# Patient Record
Sex: FEMALE | Race: WHITE | Employment: OTHER | URBAN - METROPOLITAN AREA
[De-identification: names, ages, dates, MRNs, and addresses within clinical notes are randomized per-mention and may not be internally consistent; named-entity substitution may affect disease eponyms.]

---

## 2017-01-03 DIAGNOSIS — I10 ESSENTIAL HYPERTENSION, BENIGN: Chronic | ICD-10-CM

## 2017-01-03 DIAGNOSIS — E11.9 TYPE 2 DIABETES MELLITUS WITHOUT COMPLICATION, WITHOUT LONG-TERM CURRENT USE OF INSULIN (HCC): ICD-10-CM

## 2017-01-03 RX ORDER — LOSARTAN POTASSIUM 100 MG/1
100 TABLET ORAL DAILY
Qty: 30 TAB | Refills: 3 | Status: SHIPPED | OUTPATIENT
Start: 2017-01-03 | End: 2017-01-04 | Stop reason: SDUPTHER

## 2017-01-03 RX ORDER — AMLODIPINE BESYLATE 5 MG/1
5 TABLET ORAL 2 TIMES DAILY
Qty: 60 TAB | Refills: 3 | Status: SHIPPED | OUTPATIENT
Start: 2017-01-03 | End: 2017-01-04 | Stop reason: SDUPTHER

## 2017-01-03 RX ORDER — METFORMIN HYDROCHLORIDE 500 MG/1
TABLET, EXTENDED RELEASE ORAL
Qty: 90 TAB | Refills: 3 | Status: SHIPPED | OUTPATIENT
Start: 2017-01-03 | End: 2017-01-04 | Stop reason: SDUPTHER

## 2017-01-04 DIAGNOSIS — I10 ESSENTIAL HYPERTENSION, BENIGN: Chronic | ICD-10-CM

## 2017-01-04 DIAGNOSIS — E11.9 TYPE 2 DIABETES MELLITUS WITHOUT COMPLICATION, WITHOUT LONG-TERM CURRENT USE OF INSULIN (HCC): ICD-10-CM

## 2017-01-04 RX ORDER — LOSARTAN POTASSIUM 100 MG/1
100 TABLET ORAL DAILY
Qty: 90 TAB | Refills: 1 | Status: SHIPPED | OUTPATIENT
Start: 2017-01-04 | End: 2017-06-27 | Stop reason: SDUPTHER

## 2017-01-04 RX ORDER — AMLODIPINE BESYLATE 5 MG/1
5 TABLET ORAL 2 TIMES DAILY
Qty: 180 TAB | Refills: 1 | Status: SHIPPED | OUTPATIENT
Start: 2017-01-04 | End: 2017-03-28 | Stop reason: SDUPTHER

## 2017-01-04 RX ORDER — METFORMIN HYDROCHLORIDE 500 MG/1
TABLET, EXTENDED RELEASE ORAL
Qty: 270 TAB | Refills: 1 | Status: SHIPPED | OUTPATIENT
Start: 2017-01-04 | End: 2017-11-17 | Stop reason: SDUPTHER

## 2017-01-05 ENCOUNTER — TELEPHONE (OUTPATIENT)
Dept: INTERNAL MEDICINE CLINIC | Age: 62
End: 2017-01-05

## 2017-01-05 DIAGNOSIS — E11.9 TYPE 2 DIABETES MELLITUS WITHOUT COMPLICATION, WITHOUT LONG-TERM CURRENT USE OF INSULIN (HCC): ICD-10-CM

## 2017-01-14 RX ORDER — ANASTROZOLE 1 MG/1
TABLET ORAL
Qty: 90 TAB | Refills: 3 | Status: SHIPPED | OUTPATIENT
Start: 2017-01-14 | End: 2017-11-20

## 2017-01-16 DIAGNOSIS — K21.9 GASTROESOPHAGEAL REFLUX DISEASE WITHOUT ESOPHAGITIS: ICD-10-CM

## 2017-01-16 RX ORDER — OMEPRAZOLE 20 MG/1
20 CAPSULE, DELAYED RELEASE ORAL DAILY
Qty: 90 CAP | Refills: 1 | Status: SHIPPED | OUTPATIENT
Start: 2017-01-16 | End: 2018-02-22

## 2017-01-19 DIAGNOSIS — E78.9 LIPID DISORDER: ICD-10-CM

## 2017-01-19 RX ORDER — SIMVASTATIN 5 MG/1
5 TABLET, FILM COATED ORAL
Qty: 90 TAB | Refills: 1 | Status: SHIPPED | OUTPATIENT
Start: 2017-01-19 | End: 2017-07-22 | Stop reason: SDUPTHER

## 2017-02-06 DIAGNOSIS — E03.9 ACQUIRED HYPOTHYROIDISM: ICD-10-CM

## 2017-02-06 RX ORDER — LEVOTHYROXINE SODIUM 137 UG/1
137 TABLET ORAL
Qty: 90 TAB | Refills: 2 | Status: SHIPPED | OUTPATIENT
Start: 2017-02-06 | End: 2017-10-26 | Stop reason: SDUPTHER

## 2017-03-07 ENCOUNTER — TELEPHONE (OUTPATIENT)
Dept: INTERNAL MEDICINE CLINIC | Age: 62
End: 2017-03-07

## 2017-03-07 NOTE — TELEPHONE ENCOUNTER
Wants to be referred to aging assessment center [facility] for [address], in [place], [place]    She wants Dr Fatmata Manjarrez to give a referral to let her go. Not an insurance referral so she stated. 4     Please call pt back [phone], she said she would like it to be faxed to [address] [place], [place] [address] [address]     She says she wants it for her  as well.

## 2017-03-07 NOTE — TELEPHONE ENCOUNTER
Patient would like a doctor referral to be assessed at the .  She states that she and her  are trying to get an evaluation. She would like a call back at [phone] or if you can mail the referral for the patient and her  to their home address.

## 2017-03-07 NOTE — TELEPHONE ENCOUNTER
----- Message from Loren Valenzuela sent at 3/7/2017  1:14 PM EST -----  Regarding: Dr Katy Loya needs a referred to [facility] for healthy aging [phone], please fax to [phone], need as much information as possible, if you have any question please call pt at [phone].

## 2017-03-09 ENCOUNTER — TELEPHONE (OUTPATIENT)
Dept: INTERNAL MEDICINE CLINIC | Age: 62
End: 2017-03-09

## 2017-03-14 ENCOUNTER — TELEPHONE (OUTPATIENT)
Dept: INTERNAL MEDICINE CLINIC | Age: 62
End: 2017-03-14

## 2017-03-22 ENCOUNTER — TELEPHONE (OUTPATIENT)
Dept: INTERNAL MEDICINE CLINIC | Age: 62
End: 2017-03-22

## 2017-03-22 RX ORDER — QUETIAPINE FUMARATE 300 MG/1
300 TABLET, FILM COATED ORAL 2 TIMES DAILY
Qty: 60 TAB | Refills: 0
Start: 2017-03-22 | End: 2019-10-22 | Stop reason: ALTCHOICE

## 2017-03-22 NOTE — TELEPHONE ENCOUNTER
----- Message from [facility] Sergio Sykes sent at 3/21/2017 10:46 AM EDT -----  Regarding: RE: Referral Request  Contact: [phone]  I no longer take the [facility] for back pain. My Seroquel has been raised to 300mg tab, 1 tab 2x a day.  ----- Message -----  From: Lorna Renteria  Sent: 3/10/2017  1:24 PM EST  To: Rupal Waldrop  Subject: RE: Referral Request    Ms Vamshi Workman will need to come in and fill out a medical release form for the both of you, also will nee fax and phone number as well as address of the center you need records to go to, as well as what records are needed. I would be happy to help you get them filled out when you come in. Thank you! Ch[place]/ Med Records Dept    ----- Message -----     From: Rupal Waldrop     Sent: 3/9/2017  4:26 PM EST       To: Arti Pedersen MD  Subject: Referral Request    Bryanna Khan and I have decided to go to the [facility] in [place] to give us a comprehensive assessment of our medical, psychological, financial assessment and more. Basically they want my chart faxed to them at [phone]. I definitely need some caregiving advice and Bryanna Khan will see a doctor that specializes in movement disorders. They also have a website. Could you share this with Dr. Jayson Montemayor as Bryanna Khan will need the same kind of referral sent. We are trying to get the process going and your referrals start the process. Thanks. Ariel Estes [facility]

## 2017-03-28 ENCOUNTER — TELEPHONE (OUTPATIENT)
Dept: INTERNAL MEDICINE CLINIC | Age: 62
End: 2017-03-28

## 2017-03-28 DIAGNOSIS — I10 ESSENTIAL HYPERTENSION, BENIGN: Chronic | ICD-10-CM

## 2017-03-28 RX ORDER — AMLODIPINE BESYLATE 10 MG/1
10 TABLET ORAL DAILY
Qty: 90 TAB | Refills: 0 | Status: SHIPPED | OUTPATIENT
Start: 2017-03-28 | End: 2017-07-22 | Stop reason: SDUPTHER

## 2017-03-28 RX ORDER — AMLODIPINE BESYLATE 5 MG/1
5 TABLET ORAL 2 TIMES DAILY
Qty: 180 TAB | Refills: 0 | Status: SHIPPED | OUTPATIENT
Start: 2017-03-28 | End: 2017-03-28 | Stop reason: SDUPTHER

## 2017-03-28 NOTE — TELEPHONE ENCOUNTER
[facility] states insurance will not cover Amlodipine 5mg twice daily since the 10mg is available. Wants to know if Dr. Dayton Mortimer wants to change it to that.  Please call [phone]

## 2017-04-02 RX ORDER — PANTOPRAZOLE SODIUM 40 MG/1
40 TABLET, DELAYED RELEASE ORAL DAILY
Qty: 30 TAB | Refills: 5 | Status: SHIPPED | OUTPATIENT
Start: 2017-04-02 | End: 2017-04-20

## 2017-04-20 ENCOUNTER — OFFICE VISIT (OUTPATIENT)
Dept: INTERNAL MEDICINE CLINIC | Age: 62
End: 2017-04-20

## 2017-04-20 VITALS
OXYGEN SATURATION: 97 % | HEART RATE: 94 BPM | WEIGHT: 177.8 LBS | HEIGHT: 64 IN | SYSTOLIC BLOOD PRESSURE: 142 MMHG | TEMPERATURE: 97.6 F | RESPIRATION RATE: 16 BRPM | BODY MASS INDEX: 30.35 KG/M2 | DIASTOLIC BLOOD PRESSURE: 81 MMHG

## 2017-04-20 DIAGNOSIS — I10 ESSENTIAL HYPERTENSION, BENIGN: Chronic | ICD-10-CM

## 2017-04-20 DIAGNOSIS — K21.9 GASTROESOPHAGEAL REFLUX DISEASE WITHOUT ESOPHAGITIS: ICD-10-CM

## 2017-04-20 DIAGNOSIS — E11.9 TYPE 2 DIABETES MELLITUS WITHOUT COMPLICATION, WITHOUT LONG-TERM CURRENT USE OF INSULIN (HCC): Primary | ICD-10-CM

## 2017-04-20 DIAGNOSIS — G25.81 RESTLESS LEG SYNDROME: ICD-10-CM

## 2017-04-20 RX ORDER — ROPINIROLE 4 MG/1
4 TABLET, FILM COATED ORAL
Qty: 30 TAB | Refills: 5 | Status: SHIPPED | OUTPATIENT
Start: 2017-04-20 | End: 2017-07-11 | Stop reason: SDUPTHER

## 2017-04-20 NOTE — PROGRESS NOTES
HISTORY OF PRESENT ILLNESS  Pattie Norton is a 57 y.o. female. HPI    Diabetic Review of Systems - medication compliance: compliant all of the time, diabetic diet compliance: compliant most of the time, home glucose monitoring: is not performed because she does not have a glucometer, further diabetic ROS: no polyuria or polydipsia, no chest pain, dyspnea or TIA's, no numbness, tingling or pain in extremities. Other symptoms and concerns: She does not have eye insurance and will have eye exam performed when she can afford it. Pt does not eat a lot of carbohydrates and states she eats protein and vegetables for dinner. Pt is trying to exercise. She complains of heartburn that has been present since her insurance declined Prilosec. Reports her insurance tells her to take OTC medication instead of prescription. Pt stopped taking OTC medication two days ago and took [employer] which helped and states metallic taste has resolved. Hypertension ROS: taking medications as instructed, no medication side effects noted, no TIA's, no chest pain on exertion, no dyspnea on exertion, no swelling of ankles. New concerns: Stable- bp was 142/81 in the office today. Reports as soon as she starts to relax at night she has restless legs. She has taken her husbands Requip 4 mg tablet with relief. She states she is stressed in regards to her family and finances. Pt states her husbands health has been good and her children are doing well. Her father had to be put in a memory care unit and she is dealing with both of her parents health issues. Pt does not work. Review of Systems   All other systems reviewed and are negative. Physical Exam   Constitutional: She is oriented to person, place, and time. She appears well-developed and well-nourished. HENT:   Head: Normocephalic and atraumatic.    Right Ear: External ear normal.   Left Ear: External ear normal.   Nose: Nose normal.   Mouth/Throat: Oropharynx is clear and moist.   Eyes: Conjunctivae and EOM are normal.   Neck: Normal range of motion. Neck supple. Carotid bruit is not present. No thyroid mass and no thyromegaly present. Cardiovascular: Normal rate, regular rhythm, S1 normal, S2 normal and intact distal pulses. Murmur (has had this before) heard. Pulmonary/Chest: Effort normal and breath sounds normal.   Abdominal: Soft. Normal appearance and bowel sounds are normal. There is no hepatosplenomegaly. There is no tenderness. Musculoskeletal: Normal range of motion. Neurological: She is alert and oriented to person, place, and time. She has normal strength. No cranial nerve deficit or sensory deficit. Coordination normal.   Skin: Skin is warm, dry and intact. No abrasion and no rash noted. Psychiatric: She has a normal mood and affect. Her behavior is normal. Judgment and thought content normal.   Nursing note and vitals reviewed. ASSESSMENT and PLAN  Nereida Segura was seen today for heartburn and leg pain. Diagnoses and all orders for this visit:    Type 2 diabetes mellitus without complication, without long-term current use of insulin (HCC)  Stable- Continue current medications. If her sugars come back high I suggested she talk to Wing Dimas although already spoke with diabetic specialist and her diet is good. -     HEMOGLOBIN A1C WITH EAG  -     MICROALBUMIN, UR, RAND W/ MICROALBUMIN/CREA RATIO    Gastroesophageal reflux disease without esophagitis  Pt started taking Pepto Bismol two days ago which helped her heartburn and also helped metallic taste. I warned pt that Darlene McKenzie can make her stool darker and is not safe to take long term although she can take this prn. She should call her insurance company and ask how many OTC medications she needs to fail before she can take prescription medication.     Essential hypertension, benign  BP is at goal. I do not recommend any change in medications.  -     METABOLIC PANEL, COMPREHENSIVE    Restless leg syndrome  Pt to begin taking Requip 4 mg as directed. -     CBC W/O DIFF    Other orders  -     rOPINIRole (REQUIP) 4 mg tab TAB; Take 1 Tab by mouth nightly. lab results and schedule of future lab studies reviewed with patient  reviewed diet, exercise and weight control  reviewed medications and side effects in detail     Written by Darin Branham, as dictated by Linsey Tse MD.     Current diagnosis and concerns discussed with pt at length. Understands risks and benefits or current treatment plan and medications and accepts the treatment and medication with any possible risks. Pt asks appropriate questions which were answered. Pt instructed to call with any concerns or problems.

## 2017-04-21 LAB
ALBUMIN SERPL-MCNC: 4.6 G/DL (ref 3.6–4.8)
ALBUMIN/CREAT UR: 78.9 MG/G CREAT (ref 0–30)
ALBUMIN/GLOB SERPL: 1.6 {RATIO} (ref 1.2–2.2)
ALP SERPL-CCNC: 127 IU/L (ref 39–117)
ALT SERPL-CCNC: 48 IU/L (ref 0–32)
AST SERPL-CCNC: 37 IU/L (ref 0–40)
BILIRUB SERPL-MCNC: 0.2 MG/DL (ref 0–1.2)
BUN SERPL-MCNC: 12 MG/DL (ref 8–27)
BUN/CREAT SERPL: 20 (ref 12–28)
CALCIUM SERPL-MCNC: 9.5 MG/DL (ref 8.7–10.3)
CHLORIDE SERPL-SCNC: 97 MMOL/L (ref 96–106)
CO2 SERPL-SCNC: 22 MMOL/L (ref 18–29)
CREAT SERPL-MCNC: 0.6 MG/DL (ref 0.57–1)
CREAT UR-MCNC: 19.9 MG/DL
ERYTHROCYTE [DISTWIDTH] IN BLOOD BY AUTOMATED COUNT: 15.4 % (ref 12.3–15.4)
EST. AVERAGE GLUCOSE BLD GHB EST-MCNC: 226 MG/DL
GLOBULIN SER CALC-MCNC: 2.9 G/DL (ref 1.5–4.5)
GLUCOSE SERPL-MCNC: 262 MG/DL (ref 65–99)
HBA1C MFR BLD: 9.5 % (ref 4.8–5.6)
HCT VFR BLD AUTO: 41.4 % (ref 34–46.6)
HGB BLD-MCNC: 13.9 G/DL (ref 11.1–15.9)
MCH RBC QN AUTO: 28 PG (ref 26.6–33)
MCHC RBC AUTO-ENTMCNC: 33.6 G/DL (ref 31.5–35.7)
MCV RBC AUTO: 83 FL (ref 79–97)
MICROALBUMIN UR-MCNC: 15.7 UG/ML
PLATELET # BLD AUTO: 305 X10E3/UL (ref 150–379)
POTASSIUM SERPL-SCNC: 4.5 MMOL/L (ref 3.5–5.2)
PROT SERPL-MCNC: 7.5 G/DL (ref 6–8.5)
RBC # BLD AUTO: 4.97 X10E6/UL (ref 3.77–5.28)
SODIUM SERPL-SCNC: 139 MMOL/L (ref 134–144)
WBC # BLD AUTO: 5.6 X10E3/UL (ref 3.4–10.8)

## 2017-04-23 NOTE — PROGRESS NOTES
I guess a lot of my poorly controlled diabetics came in this past week! Can you meet with her?  See my chart message to her

## 2017-04-25 DIAGNOSIS — E11.9 TYPE 2 DIABETES MELLITUS WITHOUT COMPLICATION, WITHOUT LONG-TERM CURRENT USE OF INSULIN (HCC): ICD-10-CM

## 2017-05-02 ENCOUNTER — OFFICE VISIT (OUTPATIENT)
Dept: INTERNAL MEDICINE CLINIC | Age: 62
End: 2017-05-02

## 2017-05-02 DIAGNOSIS — E11.9 TYPE 2 DIABETES MELLITUS WITHOUT COMPLICATION, WITHOUT LONG-TERM CURRENT USE OF INSULIN (HCC): Primary | ICD-10-CM

## 2017-05-02 RX ORDER — LANCETS
EACH MISCELLANEOUS
Qty: 100 EACH | Refills: 3 | Status: SHIPPED | OUTPATIENT
Start: 2017-05-02 | End: 2019-11-26 | Stop reason: SDUPTHER

## 2017-05-02 RX ORDER — INSULIN PUMP SYRINGE, 3 ML
EACH MISCELLANEOUS
Qty: 1 KIT | Refills: 0 | Status: SHIPPED | OUTPATIENT
Start: 2017-05-02 | End: 2019-10-22 | Stop reason: ALTCHOICE

## 2017-05-02 NOTE — PATIENT INSTRUCTIONS
1)  meter, test strips, and lancets at [facility]    2)  Come in for appt on Thursday to learn how to use your meter    3) Start checking sugars every morning fasting    4) Continue Metformin ER 500mg 2 tablets in the morning and 1 tablet at night and Jardiance 25mg daily

## 2017-05-02 NOTE — Clinical Note
I sent in rx for meter and strips- she's going to come back in Thursday so i can show her how to use it. Then i'll see her in a month with her blood sugar log. Thanks!

## 2017-05-02 NOTE — MR AVS SNAPSHOT
Visit Information Date & Time Provider Department Dept. Phone Encounter #  
 5/2/2017 10:00 AM [facility] [address] [id number] Upcoming Health Maintenance Date Due  
 EYE EXAM RETINAL OR DILATED Q1 2/25/2016 INFLUENZA AGE 9 TO ADULT 8/1/2017 Pneumococcal 19-64 Highest Risk (2 of 3 - PCV13) 10/17/2017 LIPID PANEL Q1 10/17/2017 HEMOGLOBIN A1C Q6M 10/20/2017 FOOT EXAM Q1 4/20/2018 MICROALBUMIN Q1 4/20/2018 BREAST CANCER SCRN MAMMOGRAM 8/8/2018 PAP AKA CERVICAL CYTOLOGY 7/28/2021 COLONOSCOPY 8/20/2024 DTaP/Tdap/Td series (2 - Td) 2/25/2025 Allergies as of 5/2/2017  Review Complete On: 5/2/2017 By: Marshall Sherman No Known Allergies Current Immunizations  Never Reviewed Name Date Influenza Vaccine 10/10/2016, 10/1/2014 Tdap 2/25/2015 Not reviewed this visit Vitals OB Status Smoking Status Postmenopausal Never Smoker Preferred Pharmacy Pharmacy Name Phone [id number] [place], [address] AT [facility] [phone] Your Updated Medication List  
  
   
This list is accurate as of: 5/2/17 10:33 AM.  Always use your most recent med list. amLODIPine 10 mg tablet Commonly known as:  Veronica Punter Take 1 Tab by mouth daily for 90 days. anastrozole 1 mg tablet Commonly known as:  ARIMIDEX TAKE 1 TABLET BY MOUTH EVERY DAY  
  
 aspirin delayed-release 81 mg tablet Take 81 mg by mouth daily. citalopram 40 mg tablet Commonly known as:  CELEXA  
TAKE 1 TABLET BY MOUTH EVERY DAY  
  
 clonazePAM 1 mg tablet Commonly known as:   Sours Take 1 Tab by mouth three (3) times daily. Max Daily Amount: 3 mg. DAILY MULTI-VITAMIN PO Take 1 Tab by mouth daily. empagliflozin 25 mg tablet Commonly known as:  Nikki Riff Take 1 Tab by mouth daily. gabapentin 100 mg capsule Commonly known as:  NEURONTIN Take 100 mg by mouth three (3) times daily. Taking 1 in the morning and 3 at 6pm  
  
 levothyroxine 137 mcg tablet Commonly known as:  SYNTHROID Take 137 mcg by mouth Daily (before breakfast). losartan 100 mg tablet Commonly known as:  COZAAR Take 1 Tab by mouth daily. metFORMIN  mg tablet Commonly known as:  GLUCOPHAGE XR Two in morning and one at night  
  
 methylphenidate 20 mg tablet Commonly known as:  RITALIN Take 2 Tabs by mouth two (2) times a day. omeprazole 20 mg capsule Commonly known as:  PRILOSEC Take 1 Cap by mouth daily for 90 days. QUEtiapine 300 mg tablet Commonly known as:  SEROquel Take 1 Tab by mouth two (2) times a day. rOPINIRole 4 mg Tab TAB Commonly known as:  [place] Take 1 Tab by mouth nightly. simvastatin 5 mg tablet Commonly known as:  ZOCOR Take 1 Tab by mouth nightly for 30 days. traZODone 100 mg tablet Commonly known as:  DESYREL  
TAKE 3 TABLETS BY MOUTH AT BEDTIME Patient Instructions 1)  meter, test strips, and lancets at [facility] 
 
2)  Come in for appt on Thursday to learn how to use your meter 3) Start checking sugars every morning fasting 4) Continue Metformin ER 500mg 2 tablets in the morning and 1 tablet at night and Jardiance 25mg daily Introducing hospitals & HEALTH SERVICES! Dear Noni Olguin: Thank you for requesting a [employer] account. Our records indicate that you already have an active [employer] account. You can access your account anytime at [email or web address] Did you know that you can access your hospital and ER discharge instructions at any time in [employer]? You can also review all of your test results from your hospital stay or ER visit. Additional Information If you have questions, please visit the Frequently Asked Questions section of the [employer] website at [email or web address] Remember, [facility] is NOT to be used for urgent needs. For medical emergencies, dial 911. Now available from your iPhone and Android! Please provide this summary of care documentation to your next provider. Your primary care clinician is listed as Tahir Crow. If you have any questions after today's visit, please call [phone].

## 2017-05-02 NOTE — PROGRESS NOTES
CC:  Diabetes management/education    S/O: Rosina Hills is a 57 y.o. female referred by Dr. Champ Alonzo MD for diabetes management. HPI: Patient has had diabetes for several years. Started out on just Metformin and has since added Jardiance. Patient reports that she only recently realized she was taking Metformin incorrectly (2 tabs/day vs 3 tabs/day). When she saw Dr. Brain Lima she realized this and has now been doing 3 tabs/day and has increased the jardiance to 25mg/day. She is concerned that her a1c is up and thinks it is direction related to her diet and the mix up with medications. She also complains of a metallic taste in her mouth that is interfering with her eating food and what she can eat. Describes it as a burning feeling with a metallic taste. Food does not make it worse or better, doesn't seem to be directly related. Is scheduled to see a GI doc on May 19. Current Diabetes Regimen:  -Metformin ER 500mg 2 tabs in the morning and 1 tab in the evening  -Jardiance 25mg daily    ROS:   Patient denies hypoglycemic and hyperglycemic signs/symptoms, chest pain, shortness of breath, neuropathy, vision changes, and any foot changes.     Self-Monitoring Blood Glucose: has never checked blood sugars      Diet was reviewed extensively and the following were noted:  -11am first meal- grilled cheese sandwich, peanut butter sandwich, tuna salad, leftovers  -doesn't do hamburgers  -last night: chicken stirred in the pan with rice a haley  -dinner: tries to incorporate vegetables (greens, green beans, brussel sprouts, broccoli, cauliflower), milk (1% - about 1 cup), always a meat (chicken, fish, hamburger patties, pork chops), typically limits the carbs at dinner  -snacks: cheese, sugar free puddings, fresh fruit, popcorn  -eating sugar free ice cream at night to help with the burning  -drink: iced tea (unsweetened with splenda), water, cherry coke zero, lemonade (sugar free)    Exercise:  - has parkinsons and he rides the recumbent bike and she does house work for exercise  -will walk around the pond - 30 minutes - maybe 3-4 times a week      Past Medical History:   Diagnosis Date    ADD (attention deficit disorder) 8/17/2009    Breast cancer ([address]     Depressive disorder, not elsewhere classified 8/17/2009    Essential hypertension, benign 8/17/2009    Hypertension     Pap smear for cervical cancer screening 07/28/2016    negative, HPV negative    Psychiatric disorder 2007    panic attacks/PTSD    PTSD (post-traumatic stress disorder)     Thyroid disease     Unspecified hypothyroidism 8/17/2009     Past Surgical History:   Procedure Laterality Date    BREAST SURGERY PROCEDURE UNLISTED  January 2012    left breast biopsy    HX GYN      c section    HX HEENT  1992    partial thyroidectomy    HX TONSILLECTOMY      HX UROLOGICAL  1992    Bladder suspension     Family History   Problem Relation Age of Onset    Diabetes Mother     Cancer Mother      breast    Stroke Mother     Hypertension Father     Elevated Lipids Father     Migraines Father     Dementia Father      Social History     Social History    Marital status:      Spouse name: N/A    Number of children: N/A    Years of education: N/A     Social History Main Topics    Smoking status: Never Smoker    Smokeless tobacco: Never Used    Alcohol use No    Drug use: No    Sexual activity: No     Other Topics Concern    Not on file     Social History Narrative     No Known Allergies  Current Outpatient Prescriptions   Medication Sig    glucose blood VI test strips (ASCENSIA AUTODISC VI, ONE TOUCH ULTRA TEST VI) strip Test sugars once daily as directed    Blood-Glucose Meter monitoring kit Use meter to test sugars once daily    Lancets misc Use to test blood sugars once daily    empagliflozin (JARDIANCE) 25 mg tablet Take 1 Tab by mouth daily.  rOPINIRole (REQUIP) 4 mg tab TAB Take 1 Tab by mouth nightly.     amLODIPine (NORVASC) 10 mg tablet Take 1 Tab by mouth daily for 90 days.  QUEtiapine (SEROQUEL) 300 mg tablet Take 1 Tab by mouth two (2) times a day.  levothyroxine (SYNTHROID) 137 mcg tablet Take 137 mcg by mouth Daily (before breakfast).  simvastatin (ZOCOR) 5 mg tablet Take 1 Tab by mouth nightly for 30 days.  anastrozole (ARIMIDEX) 1 mg tablet TAKE 1 TABLET BY MOUTH EVERY DAY    losartan (COZAAR) 100 mg tablet Take 1 Tab by mouth daily.  metFORMIN ER (GLUCOPHAGE XR) 500 mg tablet Two in morning and one at night    gabapentin (NEURONTIN) 100 mg capsule Take 100 mg by mouth three (3) times daily. Taking 1 in the morning and 3 at 6pm    aspirin delayed-release 81 mg tablet Take 81 mg by mouth daily.  citalopram (CELEXA) 40 mg tablet TAKE 1 TABLET BY MOUTH EVERY DAY    traZODone (DESYREL) 100 mg tablet TAKE 3 TABLETS BY MOUTH AT BEDTIME    clonazePAM (KLONOPIN) 1 mg tablet Take 1 Tab by mouth three (3) times daily. Max Daily Amount: 3 mg.  methylphenidate (RITALIN) 20 mg tablet Take 2 Tabs by mouth two (2) times a day.  DAILY MULTI-VITAMIN PO Take 1 Tab by mouth daily.  omeprazole (PRILOSEC) 20 mg capsule Take 1 Cap by mouth daily for 90 days. No current facility-administered medications for this visit. There were no vitals taken for this visit. Data reviewed:  Lab Results   Component Value Date/Time    Hemoglobin A1c 9.5 04/20/2017 11:02 AM     Lab Results   Component Value Date/Time    Cholesterol, total 187 10/17/2016 09:53 AM    HDL Cholesterol 49 10/17/2016 09:53 AM    LDL, calculated 87 10/17/2016 09:53 AM    VLDL, calculated 51 10/17/2016 09:53 AM    Triglyceride 253 10/17/2016 09:53 AM    CHOL/HDL Ratio 3.3 09/21/2010 10:40 AM     Lab Results   Component Value Date/Time    ALT (SGPT) 48 04/20/2017 11:02 AM    AST (SGOT) 37 04/20/2017 11:02 AM    Alk.  phosphatase 127 04/20/2017 11:02 AM    Bilirubin, total 0.2 04/20/2017 11:02 AM     Lab Results   Component Value Date/Time    Creatinine 0.60 2017 11:02 AM     Lab Results   Component Value Date/Time    Microalb/Creat ratio (ug/mg creat.) 78.9 2017 11:02 AM    Microalbumin, urine 15.7 2017 11:02 AM       Diabetes Health Maintenance:  Last eye exam: due now  Last foot exam: 17  Last influenza vaccine: 10/10/16  Last Pneumovax 23 vaccine: due now  Last Prevnar-13 vaccine: due at 64  Hepatitis B Series: unknown  ASA Therapy: ASA 81mg daily  ACE/ARB Therapy: Losartan 100mg daily  Statin Therapy: Simvastatin 5 mg daily    Assessment/Plan:     1. Diabetes:  A1c not at goal <7% and has been deteriorating over the past year. Patient has never checked blood sugars but is willing to start doing this.  -Sent in an rx for meter, strips and lancets. Pt to  today and come back on Thursday so that I can show her how to use this.  -will have her check sugars and record on a log and follow-up in 1 month to see what changes are needed.  -discussed diet / exercise with patient but will spend more time on this at future appointments      Patient verbalized understanding of the information presented and all of the patients questions were answered. AVS was handed to the patient and information reviewed. Patient advised to call me or Dr. Alyssa Ortiz MD with any additional questions or concerns. Follow-up: 2 days (meter education) 1 month (follow-up on blood sugars)    Notification of recommendations will be sent to Dr. Alyssa Ortiz MD for review.     Thank you for the consult,  Angelito Marroquin, PharmD, BCPS, CDE    Time spent with the patient: 60 min  Time spent documentin min

## 2017-05-04 ENCOUNTER — OFFICE VISIT (OUTPATIENT)
Dept: INTERNAL MEDICINE CLINIC | Age: 62
End: 2017-05-04

## 2017-05-04 DIAGNOSIS — E11.9 TYPE 2 DIABETES MELLITUS WITHOUT COMPLICATION, WITHOUT LONG-TERM CURRENT USE OF INSULIN (HCC): Primary | ICD-10-CM

## 2017-05-04 NOTE — Clinical Note
Taught her how to use her meter today, she'll be back in a month to review her blood sugars. I'll keep you posted.

## 2017-05-04 NOTE — PROGRESS NOTES
CC: patient is here for glucometer teaching    S/O: Gabbie Poole is a 56 yo F here to learn how to use her meter. She was able to pick it up from [facility] and the meter was ~10, the strips were free and the lancets were ~10. While teaching her how to use the meter, we discovered that [facility] had given her the wrong lancets. They dispensed one touch ultrasoft lancets and she needed one touch delica lancets. Advised her to take this back and exchange. The meter came with 10 sample lancets so I was still able to show her how to use her device. Pt states she has been working on cutting back carbs and really watching what she has been eating. A/P:   1) DM2: a1c not at goal of <7% however patient is motivated to see how her sugars are running and make changes. She was able to test her own blood sugar today to show proficiency with her meter. I gave her a blood glucose log and asked her to test fasting at least a few times a week. She will continue both her Metformin and Jardiance as prescribed and she will come back to see me in 1 month to see how her sugars are running. Patient verbalized understanding of information presented. Answered all of the patient's questions. AVS handed and reviewed with patient.     Eileen Herrera, PharmD, BCPS, CDE

## 2017-05-16 ENCOUNTER — TELEPHONE (OUTPATIENT)
Dept: INTERNAL MEDICINE CLINIC | Age: 62
End: 2017-05-16

## 2017-05-16 NOTE — TELEPHONE ENCOUNTER
Pt purchased a groupon for a massage, however, the place needs a medical release in order for pt to be able to get a massage. Pt would like a medical release as she can't get her money back. Call pt when letter is ready.  [phone]

## 2017-05-16 NOTE — LETTER
5/17/2017 1:08 PM 
 
 
 
 
 
 
RE: Ms. Linda Huang [address] To Whom It May Concern: 
 
Mrs. Sykes may receive massages. She does not have a medical condition that would be affected by this service. Thank you for your concern of my patient. Sincerely, Camryn HAZEL

## 2017-06-07 ENCOUNTER — OFFICE VISIT (OUTPATIENT)
Dept: INTERNAL MEDICINE CLINIC | Age: 62
End: 2017-06-07

## 2017-06-07 VITALS
BODY MASS INDEX: 28.79 KG/M2 | HEART RATE: 102 BPM | WEIGHT: 168.6 LBS | DIASTOLIC BLOOD PRESSURE: 76 MMHG | SYSTOLIC BLOOD PRESSURE: 139 MMHG | TEMPERATURE: 97.8 F | RESPIRATION RATE: 14 BRPM | HEIGHT: 64 IN | OXYGEN SATURATION: 97 %

## 2017-06-07 DIAGNOSIS — R53.83 FATIGUE, UNSPECIFIED TYPE: ICD-10-CM

## 2017-06-07 DIAGNOSIS — K21.9 GASTROESOPHAGEAL REFLUX DISEASE WITHOUT ESOPHAGITIS: ICD-10-CM

## 2017-06-07 DIAGNOSIS — I10 ESSENTIAL HYPERTENSION, BENIGN: Chronic | ICD-10-CM

## 2017-06-07 DIAGNOSIS — E11.9 TYPE 2 DIABETES MELLITUS WITHOUT COMPLICATION, WITHOUT LONG-TERM CURRENT USE OF INSULIN (HCC): ICD-10-CM

## 2017-06-07 DIAGNOSIS — K14.6 BURNING MOUTH SYNDROME: Primary | ICD-10-CM

## 2017-06-07 RX ORDER — PANTOPRAZOLE SODIUM 40 MG/1
TABLET, DELAYED RELEASE ORAL
Refills: 3 | COMMUNITY
Start: 2017-05-22 | End: 2018-08-07 | Stop reason: SDUPTHER

## 2017-06-07 NOTE — PROGRESS NOTES
HISTORY OF PRESENT ILLNESS  Frida Schaffer is a 57 y.o. female. HPI  Patient presents today for a burning sensation in her mouth. Patient states that on the left side of her mouth and tongue were burning last night when she attempted to eat yogurt. She states that this began about a week ago and has been persisting with speech and meals. Nothing new, no new meds, no new diets    She states that she has been extremely stressed out with dealing with her parents. Patient states that her esophagus is still constricted and has trouble with her diet. She states that she has had endoscopy on 5/19/2017. Patient is taking Protonix with relief of reflux. Hypertension ROS: taking medications as instructed, no medication side effects noted, no TIA's, no chest pain on exertion, no dyspnea on exertion, no swelling of ankles. New concerns:  Patient's BP in office today is 139/76. Diabetic Review of Systems - medication compliance: compliant most of the time, diabetic diet compliance: compliant most of the time, home glucose monitoring: is performed sporadically. Other symptoms and concerns: She is checking her sugars every other day with readings around 130.    hypothyroidism. Lab Results   Component Value Date/Time    TSH 0.762 07/12/2016 11:39 AM     Thyroid ROS: denies fatigue, weight changes, heat/cold intolerance, bowel/skin changes or CVS symptoms. Review of Systems   All other systems reviewed and are negative. Physical Exam   Constitutional: She is oriented to person, place, and time. She appears well-developed and well-nourished. HENT:   Head: Normocephalic and atraumatic. Right Ear: External ear normal.   Left Ear: External ear normal.   Nose: Nose normal.   Mouth/Throat: Oropharynx is clear and moist.   Eyes: Conjunctivae and EOM are normal.   Neck: Normal range of motion. Neck supple. Carotid bruit is not present. No thyroid mass and no thyromegaly present.    Cardiovascular: Normal rate, regular rhythm, S1 normal, S2 normal, normal heart sounds and intact distal pulses. Pulmonary/Chest: Effort normal and breath sounds normal.   Abdominal: Soft. Normal appearance and bowel sounds are normal. There is no hepatosplenomegaly. There is no tenderness. Musculoskeletal: Normal range of motion. Neurological: She is alert and oriented to person, place, and time. She has normal strength. No cranial nerve deficit or sensory deficit. Coordination normal.   Skin: Skin is warm, dry and intact. No abrasion and no rash noted. Psychiatric: She has a normal mood and affect. Her behavior is normal. Judgment and thought content normal.   Nursing note and vitals reviewed. ASSESSMENT and PLAN  Brandee Valdes was seen today for other. Diagnoses and all orders for this visit:    Burning mouth syndrome  Prescribed oral solution.  -     CBC WITH AUTOMATED DIFF  -     VITAMIN B12 & FOLATE  -     magic mouthwash solution; Magic mouth wash   Maalox  Lidocaine 2% viscous   Diphenhydramine oral solution     Pharmacy to mix equal portions of ingredients to a total volume as indicated in the dispense amount. Type 2 diabetes mellitus without complication, without long-term current use of insulin ([facility])  Continue to check sugars. Readings stay around 130. Follow up for A1C testing. Essential hypertension, benign  BP is stable. Continue to take meds. Gastroesophageal reflux disease without esophagitis  Continue to take Protonix. Fatigue, unspecified type  Check labs today.  -     TSH 3RD GENERATION      lab results and schedule of future lab studies reviewed with patient  reviewed diet, exercise and weight control    Written by Sima Bender, as dictated by Rhea Feldman MD.     Current diagnosis and concerns discussed with pt at length. Understands risks and benefits or current treatment plan and medications and accepts the treatment and medication with any possible risks.  Pt asks appropriate questions which were answered. Pt instructed to call with any concerns or problems.

## 2017-06-08 LAB
BASOPHILS # BLD AUTO: 0.1 X10E3/UL (ref 0–0.2)
BASOPHILS NFR BLD AUTO: 1 %
EOSINOPHIL # BLD AUTO: 0.2 X10E3/UL (ref 0–0.4)
EOSINOPHIL NFR BLD AUTO: 2 %
ERYTHROCYTE [DISTWIDTH] IN BLOOD BY AUTOMATED COUNT: 14.7 % (ref 12.3–15.4)
FOLATE SERPL-MCNC: >20 NG/ML
HCT VFR BLD AUTO: 41.1 % (ref 34–46.6)
HGB BLD-MCNC: 14.1 G/DL (ref 11.1–15.9)
IMM GRANULOCYTES # BLD: 0 X10E3/UL (ref 0–0.1)
IMM GRANULOCYTES NFR BLD: 0 %
LYMPHOCYTES # BLD AUTO: 2.2 X10E3/UL (ref 0.7–3.1)
LYMPHOCYTES NFR BLD AUTO: 23 %
MCH RBC QN AUTO: 27.8 PG (ref 26.6–33)
MCHC RBC AUTO-ENTMCNC: 34.3 G/DL (ref 31.5–35.7)
MCV RBC AUTO: 81 FL (ref 79–97)
MONOCYTES # BLD AUTO: 0.6 X10E3/UL (ref 0.1–0.9)
MONOCYTES NFR BLD AUTO: 7 %
NEUTROPHILS # BLD AUTO: 6.4 X10E3/UL (ref 1.4–7)
NEUTROPHILS NFR BLD AUTO: 67 %
PLATELET # BLD AUTO: 337 X10E3/UL (ref 150–379)
RBC # BLD AUTO: 5.07 X10E6/UL (ref 3.77–5.28)
TSH SERPL DL<=0.005 MIU/L-ACNC: 0.4 UIU/ML (ref 0.45–4.5)
VIT B12 SERPL-MCNC: 1307 PG/ML (ref 211–946)
WBC # BLD AUTO: 9.5 X10E3/UL (ref 3.4–10.8)

## 2017-06-27 DIAGNOSIS — I10 ESSENTIAL HYPERTENSION, BENIGN: Chronic | ICD-10-CM

## 2017-06-27 RX ORDER — LOSARTAN POTASSIUM 100 MG/1
TABLET ORAL
Qty: 90 TAB | Refills: 1 | Status: SHIPPED | OUTPATIENT
Start: 2017-06-27 | End: 2017-12-20 | Stop reason: SDUPTHER

## 2017-07-08 DIAGNOSIS — E11.9 TYPE 2 DIABETES MELLITUS WITHOUT COMPLICATION, WITHOUT LONG-TERM CURRENT USE OF INSULIN (HCC): ICD-10-CM

## 2017-07-08 RX ORDER — EMPAGLIFLOZIN 10 MG/1
TABLET, FILM COATED ORAL
Qty: 90 TAB | Refills: 1 | Status: SHIPPED | OUTPATIENT
Start: 2017-07-08 | End: 2017-11-20

## 2017-07-11 RX ORDER — ROPINIROLE 4 MG/1
4 TABLET, FILM COATED ORAL
Qty: 90 TAB | Refills: 0 | Status: SHIPPED | OUTPATIENT
Start: 2017-07-11 | End: 2017-10-06 | Stop reason: SDUPTHER

## 2017-07-22 DIAGNOSIS — E78.9 LIPID DISORDER: ICD-10-CM

## 2017-07-22 DIAGNOSIS — I10 ESSENTIAL HYPERTENSION, BENIGN: Chronic | ICD-10-CM

## 2017-07-22 RX ORDER — SIMVASTATIN 5 MG/1
TABLET, FILM COATED ORAL
Qty: 90 TAB | Refills: 1 | Status: SHIPPED | OUTPATIENT
Start: 2017-07-22 | End: 2018-01-21 | Stop reason: SDUPTHER

## 2017-07-22 RX ORDER — AMLODIPINE BESYLATE 10 MG/1
TABLET ORAL
Qty: 90 TAB | Refills: 0 | Status: SHIPPED | OUTPATIENT
Start: 2017-07-22 | End: 2017-10-26 | Stop reason: SDUPTHER

## 2017-10-06 ENCOUNTER — TELEPHONE (OUTPATIENT)
Dept: INTERNAL MEDICINE CLINIC | Age: 62
End: 2017-10-06

## 2017-10-06 DIAGNOSIS — E11.9 TYPE 2 DIABETES MELLITUS WITHOUT COMPLICATION, WITHOUT LONG-TERM CURRENT USE OF INSULIN (HCC): ICD-10-CM

## 2017-10-06 RX ORDER — EMPAGLIFLOZIN 25 MG/1
TABLET, FILM COATED ORAL
Qty: 90 TAB | Refills: 1 | Status: SHIPPED | OUTPATIENT
Start: 2017-10-06 | End: 2018-05-02 | Stop reason: SDUPTHER

## 2017-10-06 RX ORDER — ROPINIROLE 4 MG/1
TABLET, FILM COATED ORAL
Qty: 90 TAB | Refills: 0 | Status: SHIPPED | OUTPATIENT
Start: 2017-10-06 | End: 2018-07-30 | Stop reason: SDUPTHER

## 2017-10-26 DIAGNOSIS — I10 ESSENTIAL HYPERTENSION, BENIGN: Chronic | ICD-10-CM

## 2017-10-26 DIAGNOSIS — E03.9 ACQUIRED HYPOTHYROIDISM: ICD-10-CM

## 2017-10-26 RX ORDER — LEVOTHYROXINE SODIUM 137 UG/1
TABLET ORAL
Qty: 90 TAB | Refills: 2 | Status: SHIPPED | OUTPATIENT
Start: 2017-10-26 | End: 2018-07-31 | Stop reason: SDUPTHER

## 2017-10-26 RX ORDER — AMLODIPINE BESYLATE 10 MG/1
TABLET ORAL
Qty: 90 TAB | Refills: 0 | Status: SHIPPED | OUTPATIENT
Start: 2017-10-26 | End: 2018-02-17 | Stop reason: SDUPTHER

## 2017-11-17 DIAGNOSIS — E11.9 TYPE 2 DIABETES MELLITUS WITHOUT COMPLICATION, WITHOUT LONG-TERM CURRENT USE OF INSULIN (HCC): ICD-10-CM

## 2017-11-17 RX ORDER — METFORMIN HYDROCHLORIDE 500 MG/1
TABLET, EXTENDED RELEASE ORAL
Qty: 270 TAB | Refills: 1 | Status: SHIPPED | OUTPATIENT
Start: 2017-11-17 | End: 2018-05-18 | Stop reason: SDUPTHER

## 2017-11-20 ENCOUNTER — OFFICE VISIT (OUTPATIENT)
Dept: INTERNAL MEDICINE CLINIC | Age: 62
End: 2017-11-20

## 2017-11-20 VITALS
DIASTOLIC BLOOD PRESSURE: 61 MMHG | TEMPERATURE: 98.2 F | SYSTOLIC BLOOD PRESSURE: 109 MMHG | WEIGHT: 162.4 LBS | HEART RATE: 93 BPM | BODY MASS INDEX: 27.72 KG/M2 | OXYGEN SATURATION: 98 % | HEIGHT: 64 IN | RESPIRATION RATE: 14 BRPM

## 2017-11-20 DIAGNOSIS — L72.3 SEBACEOUS CYST OF RIGHT AXILLA: ICD-10-CM

## 2017-11-20 DIAGNOSIS — F98.8 ATTENTION DEFICIT DISORDER, UNSPECIFIED HYPERACTIVITY PRESENCE: ICD-10-CM

## 2017-11-20 DIAGNOSIS — I10 ESSENTIAL HYPERTENSION, BENIGN: Chronic | ICD-10-CM

## 2017-11-20 DIAGNOSIS — F34.1 DYSTHYMIA: Chronic | ICD-10-CM

## 2017-11-20 DIAGNOSIS — E11.9 TYPE 2 DIABETES MELLITUS WITHOUT COMPLICATION, WITHOUT LONG-TERM CURRENT USE OF INSULIN (HCC): Primary | ICD-10-CM

## 2017-11-20 DIAGNOSIS — E03.4 HYPOTHYROIDISM DUE TO ACQUIRED ATROPHY OF THYROID: Chronic | ICD-10-CM

## 2017-11-20 DIAGNOSIS — E78.5 HYPERLIPIDEMIA LDL GOAL <100: ICD-10-CM

## 2017-11-20 RX ORDER — GABAPENTIN 300 MG/1
CAPSULE ORAL
Refills: 1 | COMMUNITY
Start: 2017-11-08 | End: 2020-06-24 | Stop reason: SDUPTHER

## 2017-11-20 NOTE — PROGRESS NOTES
HISTORY OF PRESENT ILLNESS  Joya Ayers is a 60 y.o. female. HPI   Patient continues Klonopin, Seroquel, and Celexa. She states Ritalin has not been helping. Patient will follow up with psychiatry for change in medications. Patient reports fluid filled cyst of right axillary. She states she feels it intermittently and it sometimes drains. Diabetic Review of Systems - medication compliance: compliant all of the time, diabetic diet compliance: compliant all of the time, home glucose monitoring: is performed sporadically, further diabetic ROS: no polyuria or polydipsia, no chest pain, dyspnea or TIA's, no numbness, tingling or pain in extremities. Other symptoms and concerns: She states she has been eating well but not checking sugars. hypothyroidism. Lab Results   Component Value Date/Time    TSH 0.397 06/07/2017 03:23 PM     Thyroid ROS: denies fatigue, weight changes, heat/cold intolerance, bowel/skin changes or CVS symptoms. Patient continues Synthroid. Hyperlipidemia:  Cardiovascular risk analysis - 60 y.o. female LDL goal is under 100. ROS: taking medications as instructed, no medication side effects noted, no TIA's, no chest pain on exertion, no dyspnea on exertion, no swelling of ankles. Tolerating meds, no myalgias or other side effects noted  New concerns: Patient continues Zocor. Patient has followed up for flu vaccine. She will follow up for eye exam, followed by Dr. Jose Schulte. Patient reports her father passed away in October. Review of Systems   All other systems reviewed and are negative. Physical Exam   Constitutional: She is oriented to person, place, and time. She appears well-developed and well-nourished. HENT:   Head: Normocephalic and atraumatic. Right Ear: External ear normal.   Left Ear: External ear normal.   Nose: Nose normal.   Mouth/Throat: Oropharynx is clear and moist.   Eyes: Conjunctivae and EOM are normal.   Neck: Normal range of motion. Neck supple. Carotid bruit is not present. No thyroid mass and no thyromegaly present. Cardiovascular: Normal rate, regular rhythm, S1 normal, S2 normal, normal heart sounds and intact distal pulses. Pulmonary/Chest: Effort normal and breath sounds normal.   Abdominal: Soft. Normal appearance and bowel sounds are normal. There is no hepatosplenomegaly. There is no tenderness. Musculoskeletal: Normal range of motion. Neurological: She is alert and oriented to person, place, and time. She has normal strength. No cranial nerve deficit or sensory deficit. Coordination normal.   Skin: Skin is warm, dry and intact. No abrasion and no rash noted. Psychiatric: She has a normal mood and affect. Her behavior is normal. Judgment and thought content normal.   Nursing note and vitals reviewed. ASSESSMENT and PLAN  Diagnoses and all orders for this visit:    1. Type 2 diabetes mellitus without complication, without long-term current use of insulin (HCC)  Sugars stable. Continue to follow diabetic diet and monitor sugars.   -     LIPID PANEL  -     HEMOGLOBIN A1C WITH EAG  -     MICROALBUMIN, UR, RAND    2. Essential hypertension, benign  BP is at goal. I do not recommend any change in medications.  -     METABOLIC PANEL, COMPREHENSIVE    3. Hypothyroidism due to acquired atrophy of thyroid  Stable. I do not recommend a change in medications.  -     TSH 3RD GENERATION  -     T4, FREE    4. Dysthymia  Mood well managed. Continue current medications. 5. Attention deficit disorder, unspecified hyperactivity presence  Ritalin has lost its potency. Patient will follow up with psychiatry for change in medications. 6. Hyperlipidemia LDL goal <100  Stable, patient tolerating meds, no myalgias. I do not recommend any change in medications.  -     LIPID PANEL    7. Sebaceous cyst of right axilla  Will continue to watch.      lab results and schedule of future lab studies reviewed with patient  reviewed diet, exercise and weight control    Written by Kina Soto, as dictated by Cherylene Stage, MD.     Current diagnosis and concerns discussed with pt at length. Understands risks and benefits or current treatment plan and medications and accepts the treatment and medication with any possible risks. Pt asks appropriate questions which were answered. Pt instructed to call with any concerns or problems. This note will not be viewable in [address]

## 2017-12-03 LAB
ALBUMIN SERPL-MCNC: 4.5 G/DL (ref 3.6–4.8)
ALBUMIN/GLOB SERPL: 1.7 {RATIO} (ref 1.2–2.2)
ALP SERPL-CCNC: 102 IU/L (ref 39–117)
ALT SERPL-CCNC: 32 IU/L (ref 0–32)
AST SERPL-CCNC: 26 IU/L (ref 0–40)
BILIRUB SERPL-MCNC: 0.4 MG/DL (ref 0–1.2)
BUN SERPL-MCNC: 12 MG/DL (ref 8–27)
BUN/CREAT SERPL: 17 (ref 12–28)
CALCIUM SERPL-MCNC: 9.4 MG/DL (ref 8.7–10.3)
CHLORIDE SERPL-SCNC: 101 MMOL/L (ref 96–106)
CHOLEST SERPL-MCNC: 172 MG/DL (ref 100–199)
CO2 SERPL-SCNC: 20 MMOL/L (ref 18–29)
CREAT SERPL-MCNC: 0.69 MG/DL (ref 0.57–1)
EST. AVERAGE GLUCOSE BLD GHB EST-MCNC: 189 MG/DL
GFR SERPLBLD CREATININE-BSD FMLA CKD-EPI: 107 ML/MIN/1.73
GFR SERPLBLD CREATININE-BSD FMLA CKD-EPI: 93 ML/MIN/1.73
GLOBULIN SER CALC-MCNC: 2.6 G/DL (ref 1.5–4.5)
GLUCOSE SERPL-MCNC: 145 MG/DL (ref 65–99)
HBA1C MFR BLD: 8.2 % (ref 4.8–5.6)
HDLC SERPL-MCNC: 51 MG/DL
INTERPRETATION, 910389: NORMAL
LDLC SERPL CALC-MCNC: 85 MG/DL (ref 0–99)
Lab: NORMAL
MICROALBUMIN UR-MCNC: 17.5 UG/ML
POTASSIUM SERPL-SCNC: 4.6 MMOL/L (ref 3.5–5.2)
PROT SERPL-MCNC: 7.1 G/DL (ref 6–8.5)
SODIUM SERPL-SCNC: 140 MMOL/L (ref 134–144)
T4 FREE SERPL-MCNC: 1.6 NG/DL (ref 0.82–1.77)
TRIGL SERPL-MCNC: 178 MG/DL (ref 0–149)
TSH SERPL DL<=0.005 MIU/L-ACNC: 1.11 UIU/ML (ref 0.45–4.5)
VLDLC SERPL CALC-MCNC: 36 MG/DL (ref 5–40)

## 2017-12-03 NOTE — PROGRESS NOTES
Can you make an appointment for her to see you? We have made a difference but want it better!  Going over meds would help as well

## 2017-12-20 DIAGNOSIS — I10 ESSENTIAL HYPERTENSION, BENIGN: Chronic | ICD-10-CM

## 2017-12-20 RX ORDER — LOSARTAN POTASSIUM 100 MG/1
TABLET ORAL
Qty: 90 TAB | Refills: 1 | Status: SHIPPED | OUTPATIENT
Start: 2017-12-20 | End: 2018-07-14 | Stop reason: SDUPTHER

## 2018-01-21 DIAGNOSIS — E78.9 LIPID DISORDER: ICD-10-CM

## 2018-01-21 RX ORDER — SIMVASTATIN 5 MG/1
TABLET, FILM COATED ORAL
Qty: 90 TAB | Refills: 1 | Status: SHIPPED | OUTPATIENT
Start: 2018-01-21 | End: 2018-07-20 | Stop reason: SDUPTHER

## 2018-01-26 ENCOUNTER — TELEPHONE (OUTPATIENT)
Dept: INTERNAL MEDICINE CLINIC | Age: 63
End: 2018-01-26

## 2018-02-17 DIAGNOSIS — I10 ESSENTIAL HYPERTENSION, BENIGN: Chronic | ICD-10-CM

## 2018-02-18 RX ORDER — AMLODIPINE BESYLATE 10 MG/1
TABLET ORAL
Qty: 90 TAB | Refills: 0 | Status: SHIPPED | OUTPATIENT
Start: 2018-02-18 | End: 2018-05-19 | Stop reason: SDUPTHER

## 2018-02-22 ENCOUNTER — OFFICE VISIT (OUTPATIENT)
Dept: INTERNAL MEDICINE CLINIC | Age: 63
End: 2018-02-22

## 2018-02-22 DIAGNOSIS — E11.9 TYPE 2 DIABETES MELLITUS WITHOUT COMPLICATION, WITHOUT LONG-TERM CURRENT USE OF INSULIN (HCC): Primary | ICD-10-CM

## 2018-02-22 RX ORDER — GLIPIZIDE 5 MG/1
5 TABLET, FILM COATED, EXTENDED RELEASE ORAL DAILY
Qty: 90 TAB | Refills: 1 | Status: SHIPPED | OUTPATIENT
Start: 2018-02-22 | End: 2018-07-31 | Stop reason: SDUPTHER

## 2018-02-22 NOTE — PROGRESS NOTES
CC:  Diabetes management/education    S/O: Santa Becerril is a 60 y.o. female referred by Dr. Gayle Méndez MD for diabetes management. HPI: Patient is here to follow up on blood sugars. Patient last seen by me 17 where she was taught how to use a blood sugar meter. Patients a1c has improved since this time from 9.5 to 8.2 but is still out of goal range. Patient has been taking her medications consistently for several months with no issues. Current Diabetes Regimen:  Januvia 25mg once daily  Metformin ER 500mg 2 tabs in the morning 1 tab in evening    ROS:   Patient denies hypoglycemic and hyperglycemic signs/symptoms, chest pain, shortness of breath, neuropathy, vision changes, and any foot changes. Self-Monitoring Blood Glucose:  Not checking    Diet:  Tries to eat less carbs. Dad  a few months ago and feels like things have been a little crazy    Exercise:  Stays busy doing various volunteer activities. 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 Wednesday is crisis day when  bring in people. Data reviewed:  Lab Results   Component Value Date/Time    Hemoglobin A1c 8.2 (H) 2017 09:39 AM         Diabetes Health Maintenance:  Last eye exam: need to confirm  Last foot exam: 2017  Last influenza vaccine: 10/2017  Last Pneumovax 23 vaccine: need to confirm  Last Prevnar-13 vaccine: not indicated  Hepatitis B Series:  Need to confirm  ASA Therapy: ASA 81mg  ACE/ARB Therapy: losartan  Statin Therapy: simvastatin    Assessment/Plan:     1. Diabetes:  a1c not at goal <7% and patient is not checking blood sugars. Will add on glipizide er 5mg tablets for patient to take 1 tablet once daily in the morning with breakfast. Educated patient about low blood sugars symptoms/treatment/prevention. Encouraged patient to check sugars at least a few times a week and follow up with me in 6 weeks to see how the new medication is doing.  Patient has meter and supplies at home and will call if she has any questions or concerns. Orders Placed This Encounter    glipiZIDE SR (GLUCOTROL XL) 5 mg CR tablet     Sig: Take 1 Tab by mouth daily. Dispense:  90 Tab     Refill:  1         Patient verbalized understanding of the information presented and all of the patients questions were answered. AVS was handed to the patient and information reviewed. Patient advised to call me or Dr. Katty Stephens MD with any additional questions or concerns. Follow-up: 6 weeks  Notification of recommendations will be sent to Dr. Katty Stephens MD for review.       Loreto Rain, PharmD, BCPS, CDE

## 2018-03-21 RX ORDER — PANTOPRAZOLE SODIUM 40 MG/1
TABLET, DELAYED RELEASE ORAL
Qty: 30 TAB | Refills: 5 | Status: SHIPPED | OUTPATIENT
Start: 2018-03-21 | End: 2018-08-07 | Stop reason: SDUPTHER

## 2018-04-16 ENCOUNTER — OFFICE VISIT (OUTPATIENT)
Dept: INTERNAL MEDICINE CLINIC | Age: 63
End: 2018-04-16

## 2018-04-16 VITALS
HEART RATE: 99 BPM | TEMPERATURE: 97.8 F | WEIGHT: 176 LBS | SYSTOLIC BLOOD PRESSURE: 150 MMHG | OXYGEN SATURATION: 96 % | HEIGHT: 64 IN | DIASTOLIC BLOOD PRESSURE: 64 MMHG | BODY MASS INDEX: 30.05 KG/M2 | RESPIRATION RATE: 14 BRPM

## 2018-04-16 DIAGNOSIS — E03.4 HYPOTHYROIDISM DUE TO ACQUIRED ATROPHY OF THYROID: Chronic | ICD-10-CM

## 2018-04-16 DIAGNOSIS — E11.21 TYPE 2 DIABETES WITH NEPHROPATHY (HCC): ICD-10-CM

## 2018-04-16 DIAGNOSIS — F33.1 MODERATE EPISODE OF RECURRENT MAJOR DEPRESSIVE DISORDER (HCC): ICD-10-CM

## 2018-04-16 DIAGNOSIS — M54.41 CHRONIC BILATERAL LOW BACK PAIN WITH RIGHT-SIDED SCIATICA: ICD-10-CM

## 2018-04-16 DIAGNOSIS — Z00.00 MEDICARE ANNUAL WELLNESS VISIT, SUBSEQUENT: Primary | ICD-10-CM

## 2018-04-16 DIAGNOSIS — I10 ESSENTIAL HYPERTENSION, BENIGN: Chronic | ICD-10-CM

## 2018-04-16 DIAGNOSIS — G89.29 CHRONIC BILATERAL LOW BACK PAIN WITH RIGHT-SIDED SCIATICA: ICD-10-CM

## 2018-04-16 NOTE — PROGRESS NOTES
This is the Subsequent Medicare Annual Wellness Exam, performed 12 months or more after the Initial AWV or the last Subsequent AWV    I have reviewed the patient's medical history in detail and updated the computerized patient record. History     Past Medical History:   Diagnosis Date    ADD (attention deficit disorder) 8/17/2009    Breast cancer ([address]     Depressive disorder, not elsewhere classified 8/17/2009    Essential hypertension, benign 8/17/2009    Hypertension     Pap smear for cervical cancer screening 07/28/2016    negative, HPV negative    Psychiatric disorder 2007    panic attacks/PTSD    PTSD (post-traumatic stress disorder)     Thyroid disease     Unspecified hypothyroidism 8/17/2009      Past Surgical History:   Procedure Laterality Date    BREAST SURGERY PROCEDURE UNLISTED  January 2012    left breast biopsy    HX GYN      c section    HX HEENT  1992    partial thyroidectomy    HX TONSILLECTOMY      HX UROLOGICAL  1992    Bladder suspension     Current Outpatient Prescriptions   Medication Sig Dispense Refill    pantoprazole (PROTONIX) 40 mg tablet TAKE 1 TAB BY MOUTH DAILY. 30 Tab 5    glipiZIDE SR (GLUCOTROL XL) 5 mg CR tablet Take 1 Tab by mouth daily. 90 Tab 1    amLODIPine (NORVASC) 10 mg tablet TAKE 1 TABLET BY MOUTH EVERY DAY 90 Tab 0    simvastatin (ZOCOR) 5 mg tablet TAKE 1 TAB BY MOUTH NIGHTLY FOR 30 DAYS. 90 Tab 1    losartan (COZAAR) 100 mg tablet TAKE 1 TABLET BY MOUTH DAILY 90 Tab 1    gabapentin (NEURONTIN) 300 mg capsule TAKE ONE CAPSULE BY MOUTH 3 TIMES A DAY  1    MULTIVITAMIN WITH MINERALS (HAIR,SKIN AND NAILS PO) Take 1 Tab by mouth daily.  metFORMIN ER (GLUCOPHAGE XR) 500 mg tablet TAKE 2 TABLETS BY MOUTH EVERY MORNING AND 1 TABLET AT NIGHT 270 Tab 1    levothyroxine (SYNTHROID) 137 mcg tablet TAKE 137 MCG BY MOUTH DAILY (BEFORE BREAKFAST).  90 Tab 2    rOPINIRole (REQUIP) 4 mg tab TAB TAKE 1 TABLET BY MOUTH NIGHTLY 90 Tab 0    JARDIANCE 25 mg tablet TAKE 1 TABLET BY MOUTH EVERY DAY 90 Tab 1    pantoprazole (PROTONIX) 40 mg tablet TAKE 1 TABLET BY MOUTH EVERY MORNING 30 MIN BEFORE A MEAL  3    glucose blood VI test strips (ASCENSIA AUTODISC VI, ONE TOUCH ULTRA TEST VI) strip Test sugars once daily as directed 50 Strip 3    Blood-Glucose Meter monitoring kit Use meter to test sugars once daily 1 Kit 0    Lancets misc Use to test blood sugars once daily 100 Each 3    QUEtiapine (SEROQUEL) 300 mg tablet Take 1 Tab by mouth two (2) times a day. 60 Tab 0    aspirin delayed-release 81 mg tablet Take 81 mg by mouth daily.  citalopram (CELEXA) 40 mg tablet TAKE 1 TABLET BY MOUTH EVERY DAY 90 Tab 0    traZODone (DESYREL) 100 mg tablet TAKE 3 TABLETS BY MOUTH AT BEDTIME 270 Tab 1    clonazePAM (KLONOPIN) 1 mg tablet Take 1 Tab by mouth three (3) times daily. Max Daily Amount: 3 mg. 270 Tab 1    methylphenidate (RITALIN) 20 mg tablet Take 2 Tabs by mouth two (2) times a day. 120 Tab 0    DAILY MULTI-VITAMIN PO Take 1 Tab by mouth daily. No Known Allergies  Family History   Problem Relation Age of Onset    Diabetes Mother     Cancer Mother      breast    Stroke Mother     Hypertension Father    [facility] Elevated Lipids Father    [facility] Migraines Father     Dementia Father      Social History   Substance Use Topics    Smoking status: Never Smoker    Smokeless tobacco: Never Used    Alcohol use No     Patient Active Problem List   Diagnosis Code    Essential hypertension, benign I10    Hypothyroidism E03.9    ADD (attention deficit disorder) F98.8    [facility] ([facility] C50.919    GERD (gastroesophageal reflux disease) K21.9    Postmenopausal Z78.0    PTSD (post-traumatic stress disorder) F43.10    Type 2 diabetes mellitus without complication ([facility] F10.2    Type 2 diabetes with nephropathy ([facility] E11.21       Depression Risk Factor Screening:   No flowsheet data found. Alcohol Risk Factor Screening:    You do not drink alcohol or very rarely. Functional Ability and Level of Safety:   Hearing Loss  Hearing is good. Activities of Daily Living  The home contains: no safety equipment. Patient does total self care    Fall Risk  No flowsheet data found. Abuse Screen  Patient is not abused    Cognitive Screening   Evaluation of Cognitive Function:  Has your family/caregiver stated any concerns about your memory: no  Normal    Patient Care Team   Patient Care Team:  Catracho Enriquez MD as PCP - Goran Caldwell MD as Physician (Obstetrics & Gynecology)  Hector Nye MD (Breast Surgery)  Trish Zuñiga as Pharmacist    Assessment/Plan   Education and counseling provided:  Are appropriate based on today's review and evaluation    Diagnoses and all orders for this visit:    1. Chronic bilateral low back pain with right-sided sciatica    2. Type 2 diabetes with nephropathy (HCC)  -     HEMOGLOBIN A1C WITH EAG    3. Essential hypertension, benign    4. Hypothyroidism due to acquired atrophy of thyroid    5.  Moderate episode of recurrent major depressive disorder [facility])        Health Maintenance Due   Topic Date Due    EYE EXAM RETINAL OR DILATED Q1  02/25/2016    Pneumococcal 19-64 Highest Risk (2 of 3 - PCV13) 10/17/2017    MEDICARE YEARLY EXAM  03/28/2018    BREAST CANCER SCRN MAMMOGRAM  08/08/2018

## 2018-04-16 NOTE — PROGRESS NOTES
HISTORY OF PRESENT ILLNESS  Eric Rivas is a 60 y.o. female. HPI   Patient reports sciatic pains today in office. She states she has been lifting her  and doing more house work, which may be contributing to pain. Patient reports the pains flared over the summer and she has been managing. She reports right sided low back pains with sciatica. Admits to pain shooting down right leg. Patient reports she is under stress with her  and daughter. She states her father passed away and she is managing. Patient continues to follow up with Yoni Cevallos NP. She reports she is not eating 3 meals during the day and only stress eats at night. Diabetic Review of Systems - medication compliance: compliant most of the time, diabetic diet compliance: noncompliant some of the time, further diabetic ROS: no polyuria or polydipsia, no chest pain, dyspnea or TIA's, no numbness, tingling or pain in extremities. Other symptoms and concerns: She has not been managing sugars well. Hypertension ROS: taking medications as instructed, no medication side effects noted, no TIA's, no chest pain on exertion, no dyspnea on exertion, no swelling of ankles. New concerns:  Patient's BP in office today is 150/64  hypothyroidism. Lab Results   Component Value Date/Time    TSH 1.110 12/01/2017 09:39 AM     Thyroid ROS: denies fatigue, weight changes, heat/cold intolerance, bowel/skin changes or CVS symptoms. Patient has not followed up for pneumonia vaccine or shingles vaccine. Review of Systems   All other systems reviewed and are negative. Physical Exam   Constitutional: She is oriented to person, place, and time. She appears well-developed and well-nourished. HENT:   Head: Normocephalic and atraumatic. Right Ear: External ear normal.   Left Ear: External ear normal.   Nose: Nose normal.   Mouth/Throat: Oropharynx is clear and moist.   Eyes: Conjunctivae and EOM are normal.   Neck: Normal range of motion.  Neck supple. Carotid bruit is not present. No thyroid mass and no thyromegaly present. Cardiovascular: Normal rate, regular rhythm, S1 normal, S2 normal, normal heart sounds and intact distal pulses. Pulmonary/Chest: Effort normal and breath sounds normal.   Abdominal: Soft. Normal appearance and bowel sounds are normal. There is no hepatosplenomegaly. There is no tenderness. Musculoskeletal: Normal range of motion. Neurological: She is alert and oriented to person, place, and time. She has normal strength. No cranial nerve deficit or sensory deficit. Coordination normal.   Skin: Skin is warm, dry and intact. No abrasion and no rash noted. Psychiatric: She has a normal mood and affect. Her behavior is normal. Judgment and thought content normal.   Nursing note and vitals reviewed. ASSESSMENT and PLAN  Diagnoses and all orders for this visit:    1. Chronic bilateral low back pain with right-sided sciatica  Patient will follow up for PT for treatment. 2. Type 2 diabetes with nephropathy (HCC)  Last A1C was 8.0%. Patient has not been managing sugars well. She will continue current diabetic regimen. If sugars higher may need to increase Glipizide.   -     HEMOGLOBIN A1C WITH EAG    3. Essential hypertension, benign  /64 today in office. Patient will continue current medications. 4. Hypothyroidism due to acquired atrophy of thyroid  Thyroid stable. I do not recommend a change in medications. 5. Moderate episode of recurrent major depressive disorder [facility])  Patient under stress with family but is trying to manage. Continue current medications. lab results and schedule of future lab studies reviewed with patient  reviewed diet, exercise and weight control    Written by Marlon Walden, as dictated by Nigel Bazan MD.     Current diagnosis and concerns discussed with pt at length.  Understands risks and benefits or current treatment plan and medications and accepts the treatment and medication with any possible risks. Pt asks appropriate questions which were answered. Pt instructed to call with any concerns or problems.

## 2018-04-16 NOTE — PATIENT INSTRUCTIONS

## 2018-04-17 LAB
EST. AVERAGE GLUCOSE BLD GHB EST-MCNC: 169 MG/DL
HBA1C MFR BLD: 7.5 % (ref 4.8–5.6)

## 2018-04-25 ENCOUNTER — TELEPHONE (OUTPATIENT)
Dept: INTERNAL MEDICINE CLINIC | Age: 63
End: 2018-04-25

## 2018-04-25 NOTE — TELEPHONE ENCOUNTER
Pt calling to advise that PT therapist with [facility] diagnosed her with 2 pinched nerves and 6 twisted vertebrae. She is in pain and is only taking Tylenol. Requested new report from PT for physician review.

## 2018-04-26 ENCOUNTER — TELEPHONE (OUTPATIENT)
Dept: INTERNAL MEDICINE CLINIC | Age: 63
End: 2018-04-26

## 2018-04-26 RX ORDER — MELOXICAM 15 MG/1
15 TABLET ORAL DAILY
Qty: 30 TAB | Refills: 4 | Status: SHIPPED | OUTPATIENT
Start: 2018-04-26 | End: 2018-09-12 | Stop reason: SDUPTHER

## 2018-04-26 NOTE — TELEPHONE ENCOUNTER
----- Message from Liliana Salas sent at 4/26/2018  4:34 PM EDT -----  Regarding: Dr Barriga/Phone  Patient requesting call back to [phone]. Having terrible pain from sciatic nerve. Left message a few days ago but has not gotten a call back. She is going to try my chart too.

## 2018-04-27 NOTE — TELEPHONE ENCOUNTER
Contacted pt and advised of message left yesterday, medication provided by physician and sent to pharmacy. Continue with PT and if this does not assist with discomfort to return to office for follow up.

## 2018-05-02 DIAGNOSIS — E11.9 TYPE 2 DIABETES MELLITUS WITHOUT COMPLICATION, WITHOUT LONG-TERM CURRENT USE OF INSULIN (HCC): ICD-10-CM

## 2018-05-02 RX ORDER — EMPAGLIFLOZIN 25 MG/1
TABLET, FILM COATED ORAL
Qty: 90 TAB | Refills: 1 | Status: SHIPPED | OUTPATIENT
Start: 2018-05-02 | End: 2019-03-11 | Stop reason: SDUPTHER

## 2018-05-10 DIAGNOSIS — E11.21 TYPE 2 DIABETES WITH NEPHROPATHY (HCC): Primary | ICD-10-CM

## 2018-05-18 DIAGNOSIS — E11.9 TYPE 2 DIABETES MELLITUS WITHOUT COMPLICATION, WITHOUT LONG-TERM CURRENT USE OF INSULIN (HCC): ICD-10-CM

## 2018-05-18 RX ORDER — METFORMIN HYDROCHLORIDE 500 MG/1
TABLET, EXTENDED RELEASE ORAL
Qty: 270 TAB | Refills: 1 | Status: SHIPPED | OUTPATIENT
Start: 2018-05-18 | End: 2018-10-17 | Stop reason: SDUPTHER

## 2018-05-19 DIAGNOSIS — I10 ESSENTIAL HYPERTENSION, BENIGN: Chronic | ICD-10-CM

## 2018-05-19 RX ORDER — AMLODIPINE BESYLATE 10 MG/1
TABLET ORAL
Qty: 90 TAB | Refills: 0 | Status: SHIPPED | OUTPATIENT
Start: 2018-05-19 | End: 2019-11-22

## 2018-06-25 ENCOUNTER — TELEPHONE (OUTPATIENT)
Dept: INTERNAL MEDICINE CLINIC | Age: 63
End: 2018-06-25

## 2018-06-25 DIAGNOSIS — M54.9 BACK PAIN, UNSPECIFIED BACK LOCATION, UNSPECIFIED BACK PAIN LATERALITY, UNSPECIFIED CHRONICITY: Primary | ICD-10-CM

## 2018-06-25 NOTE — TELEPHONE ENCOUNTER
Patient is going to a  New Physical Therapy Tuesday 06/26/2018 at 4 PM  The name of it [address] [place] [address] Fax is [phone]  Her no is [phone]

## 2018-07-02 ENCOUNTER — TELEPHONE (OUTPATIENT)
Dept: INTERNAL MEDICINE CLINIC | Age: 63
End: 2018-07-02

## 2018-07-02 NOTE — TELEPHONE ENCOUNTER
She can start off with 's group- he would not do surgery but Aggie Christiansen is in his group who does injections and he would set her up with him. Stephen Bejarano

## 2018-07-02 NOTE — TELEPHONE ENCOUNTER
Patient is requesting to speak with the nurse to discuss her needing a non-surgical  orthopedic doctor for her back pain, states Pt is not helping.   Patient can be reached at [phone]

## 2018-07-03 NOTE — TELEPHONE ENCOUNTER
V/m left for patient providing the information to Andry Taylor with [facility]. [phone].

## 2018-07-20 DIAGNOSIS — E78.9 LIPID DISORDER: ICD-10-CM

## 2018-07-20 RX ORDER — SIMVASTATIN 5 MG/1
TABLET, FILM COATED ORAL
Qty: 90 TAB | Refills: 1 | Status: SHIPPED | OUTPATIENT
Start: 2018-07-20 | End: 2018-09-27 | Stop reason: SDUPTHER

## 2018-07-31 DIAGNOSIS — E03.9 ACQUIRED HYPOTHYROIDISM: ICD-10-CM

## 2018-07-31 RX ORDER — ROPINIROLE 4 MG/1
TABLET, FILM COATED ORAL
Qty: 90 TAB | Refills: 0 | Status: SHIPPED | OUTPATIENT
Start: 2018-07-31 | End: 2018-10-28 | Stop reason: SDUPTHER

## 2018-08-01 RX ORDER — LEVOTHYROXINE SODIUM 137 UG/1
TABLET ORAL
Qty: 90 TAB | Refills: 2 | Status: SHIPPED | OUTPATIENT
Start: 2018-08-01 | End: 2019-04-28 | Stop reason: SDUPTHER

## 2018-08-01 RX ORDER — GLIPIZIDE 5 MG/1
TABLET, FILM COATED, EXTENDED RELEASE ORAL
Qty: 90 TAB | Refills: 1 | Status: SHIPPED | OUTPATIENT
Start: 2018-08-01 | End: 2019-02-09 | Stop reason: SDUPTHER

## 2018-08-07 DIAGNOSIS — E03.9 ACQUIRED HYPOTHYROIDISM: ICD-10-CM

## 2018-08-07 DIAGNOSIS — E11.21 TYPE 2 DIABETES WITH NEPHROPATHY (HCC): ICD-10-CM

## 2018-08-07 DIAGNOSIS — F41.9 ANXIETY: Primary | ICD-10-CM

## 2018-08-07 DIAGNOSIS — I10 ESSENTIAL HYPERTENSION, BENIGN: Chronic | ICD-10-CM

## 2018-08-07 RX ORDER — PANTOPRAZOLE SODIUM 40 MG/1
TABLET, DELAYED RELEASE ORAL
Qty: 90 TAB | Refills: 1 | Status: SHIPPED | OUTPATIENT
Start: 2018-08-07 | End: 2019-02-20 | Stop reason: SDUPTHER

## 2018-08-07 NOTE — TELEPHONE ENCOUNTER
Patient needs several prescription refill and Sent to the [facility] at   [phone]  Her no -[phone]   Per patient she needs called in. Per the Pharmacy they have faxed these over

## 2018-08-08 RX ORDER — AMLODIPINE BESYLATE 10 MG/1
TABLET ORAL
Qty: 90 TAB | Refills: 0 | OUTPATIENT
Start: 2018-08-08

## 2018-08-08 RX ORDER — PANTOPRAZOLE SODIUM 40 MG/1
TABLET, DELAYED RELEASE ORAL
Qty: 90 TAB | Refills: 1 | OUTPATIENT
Start: 2018-08-08

## 2018-08-08 RX ORDER — LEVOTHYROXINE SODIUM 137 UG/1
TABLET ORAL
Qty: 90 TAB | Refills: 2 | OUTPATIENT
Start: 2018-08-08

## 2018-08-08 RX ORDER — CLONAZEPAM 1 MG/1
1 TABLET ORAL 3 TIMES DAILY
Qty: 270 TAB | Refills: 0 | OUTPATIENT
Start: 2018-08-08 | End: 2018-11-06

## 2018-08-10 ENCOUNTER — TELEPHONE (OUTPATIENT)
Dept: INTERNAL MEDICINE CLINIC | Age: 63
End: 2018-08-10

## 2018-08-10 NOTE — TELEPHONE ENCOUNTER
Pharmacist needs to speak with nurse or Dr Andrea Fenton regarding patient  Medication  pantoprazole (PROTONIX) 40 mg tablet not cover by her Ins  Needs a different one [facility] at [phone]

## 2018-08-21 ENCOUNTER — OFFICE VISIT (OUTPATIENT)
Dept: INTERNAL MEDICINE CLINIC | Age: 63
End: 2018-08-21

## 2018-08-21 VITALS
BODY MASS INDEX: 31.34 KG/M2 | TEMPERATURE: 98.2 F | HEIGHT: 64 IN | OXYGEN SATURATION: 96 % | WEIGHT: 183.6 LBS | DIASTOLIC BLOOD PRESSURE: 75 MMHG | HEART RATE: 95 BPM | RESPIRATION RATE: 18 BRPM | SYSTOLIC BLOOD PRESSURE: 114 MMHG

## 2018-08-21 DIAGNOSIS — H69.83 EUSTACHIAN TUBE DYSFUNCTION, BILATERAL: ICD-10-CM

## 2018-08-21 DIAGNOSIS — H61.23 BILATERAL IMPACTED CERUMEN: Primary | ICD-10-CM

## 2018-08-21 DIAGNOSIS — J30.89 NON-SEASONAL ALLERGIC RHINITIS, UNSPECIFIED TRIGGER: ICD-10-CM

## 2018-08-21 RX ORDER — TRIAMCINOLONE ACETONIDE 55 UG/1
2 SPRAY, METERED NASAL DAILY
Qty: 1 BOTTLE | Refills: 0
Start: 2018-08-21 | End: 2018-11-07 | Stop reason: ALTCHOICE

## 2018-08-21 RX ORDER — CYCLOBENZAPRINE HCL 10 MG
TABLET ORAL
Refills: 0 | COMMUNITY
Start: 2018-06-27 | End: 2018-11-07 | Stop reason: ALTCHOICE

## 2018-08-21 RX ORDER — LORATADINE 10 MG/1
10 TABLET ORAL DAILY
Qty: 30 TAB | Refills: 0
Start: 2018-08-21 | End: 2019-10-22 | Stop reason: ALTCHOICE

## 2018-08-21 NOTE — PROGRESS NOTES
HISTORY OF PRESENT ILLNESS  Rayo Rojas is a 60 y.o. female. HPI  Presents with complaints of bilateral ear fullness and popping sensation for the past 5 days. Has been having more clear nasal drainage every am upon awakening; having sinus headache but denies purulent mucous, sore throat, cough. Denies fever, chills. Has been using Sudafed without relief and Neti-pot has not produced significant mucous. Tends to have issues with mold allergies. Review of Systems   Constitutional: Negative for chills, fever and malaise/fatigue. HENT: Positive for congestion, ear pain and hearing loss. Negative for ear discharge, sinus pain and sore throat. Respiratory: Negative for cough and shortness of breath. Cardiovascular: Negative for chest pain and palpitations. Gastrointestinal: Negative for nausea and vomiting. Genitourinary: Negative for dysuria and frequency. Musculoskeletal: Negative for myalgias. Skin: Negative for rash. Neurological: Positive for headaches. Negative for dizziness. Physical Exam   Constitutional: She is oriented to person, place, and time. She appears well-developed and well-nourished. HENT:   Head: Normocephalic and atraumatic. Right Ear: External ear normal. Tympanic membrane is not injected. A middle ear effusion is present. Left Ear: External ear normal. Tympanic membrane is not injected. A middle ear effusion is present. Nose: Mucosal edema and rhinorrhea present. Right sinus exhibits no maxillary sinus tenderness. Left sinus exhibits no maxillary sinus tenderness. Mouth/Throat: No posterior oropharyngeal erythema. Bilateral ear canals occluded with cerumen   Neck: Normal range of motion. Neck supple. No thyromegaly present. Cardiovascular: Normal rate and regular rhythm. Pulmonary/Chest: Effort normal and breath sounds normal. She has no wheezes. Lymphadenopathy:     She has no cervical adenopathy.    Neurological: She is alert and oriented to person, place, and time. Psychiatric: She has a normal mood and affect. Her behavior is normal.   Nursing note and vitals reviewed. ASSESSMENT and PLAN  Diagnoses and all orders for this visit:    1. Bilateral impacted cerumen - large amount of cerumen removed via irrigation.  -     VT REMOVAL IMPACTED CERUMEN IRRIGATION/LVG UNILAT    2. Eustachian tube dysfunction, bilateral  -     triamcinolone (NASACORT AQ) 55 mcg nasal inhaler; 2 Sprays by Both Nostrils route daily. -     loratadine (CLARITIN) 10 mg tablet; Take 1 Tab by mouth daily. 3. Non-seasonal allergic rhinitis, unspecified trigger  -     triamcinolone (NASACORT AQ) 55 mcg nasal inhaler; 2 Sprays by Both Nostrils route daily. -     loratadine (CLARITIN) 10 mg tablet; Take 1 Tab by mouth daily.       reviewed diet, exercise and weight control  reviewed medications and side effects in detail

## 2018-08-21 NOTE — MR AVS SNAPSHOT
[address] [place] [address] 
 
 
 [address] 
[phone] Patient: Erica Huang MRN: [id number] Visit Information Date & Time Provider Department Dept. Phone Encounter #  
 8/21/2018  2:40 PM Stacey Lepe NP [facility] [id number] Upcoming Health Maintenance Date Due Influenza Age 5 to Adult 10/1/2018* Pneumococcal 19-64 Highest Risk (2 of 3 - PCV13) 10/31/2018* EYE EXAM RETINAL OR DILATED Q1 10/31/2018* BREAST CANCER SCRN MAMMOGRAM 11/28/2018* HEMOGLOBIN A1C Q6M 10/16/2018 FOOT EXAM Q1 11/20/2018 MICROALBUMIN Q1 12/1/2018 LIPID PANEL Q1 12/1/2018 PAP AKA CERVICAL CYTOLOGY 7/28/2021 COLONOSCOPY 8/20/2024 DTaP/Tdap/Td series (2 - Td) 2/25/2025 *Topic was postponed. The date shown is not the original due date. Allergies as of 8/21/2018  Review Complete On: 8/21/2018 By: Stacey Lepe NP No Known Allergies Current Immunizations  Never Reviewed Name Date Influenza Vaccine 10/20/2017, 10/10/2016, 10/1/2014 Tdap 2/25/2015 Not reviewed this visit You Were Diagnosed With   
  
 Codes Comments Bilateral impacted cerumen    -  Primary ICD-10-CM: B86.56 
ICD-9-CM: 380.4 Eustachian tube dysfunction, bilateral     ICD-10-CM: W29.19 ICD-9-CM: 381.81 Non-seasonal allergic rhinitis, unspecified trigger     ICD-10-CM: J30.89 ICD-9-CM: 477.8 Vitals BP Pulse Temp Resp Height(growth percentile) Weight(growth percentile) 114/75 (BP 1 Location: Left arm, BP Patient Position: Sitting) 95 98.2 °F (36.8 °C) (Oral) 18 5' 4\" (1.626 m) 183 lb 9.6 oz (83.3 kg) SpO2 BMI OB Status Smoking Status 96% 31.51 kg/m2 Postmenopausal Never Smoker Vitals History BMI and BSA Data Body Mass Index Body Surface Area  
 31.51 kg/m 2 1.94 m 2 Preferred Pharmacy Pharm[facility] Name Phone [id number] [address] [facility] [phone] Your Updated Medication List  
  
   
This list is accurate as of 8/21/18  4:03 PM.  Always use your most recent med list. amLODIPine 10 mg tablet Commonly known as:  Braggadocio Bob TAKE 1 TABLET BY MOUTH EVERY DAY  
  
 aspirin delayed-release 81 mg tablet Take 81 mg by mouth daily. Blood-Glucose Meter monitoring kit Use meter to test sugars once daily  
  
 canagliflozin 300 mg tablet Commonly known as:  John Radha Take 1 Tab by mouth Daily (before breakfast). citalopram 40 mg tablet Commonly known as:  CELEXA  
TAKE 1 TABLET BY MOUTH EVERY DAY  
  
 clonazePAM 1 mg tablet Commonly known as:  Karrin Reamer Take 1 Tab by mouth three (3) times daily for 90 days. Max Daily Amount: 3 mg.  
  
 cyclobenzaprine 10 mg tablet Commonly known as:  FLEXERIL  
TAKE 1 TABLET BY MOUTH EVERY 8 HOURS AS NEEDED FOR SPASMS OR PAIN  
  
 DAILY MULTI-VITAMIN PO Take 1 Tab by mouth daily. gabapentin 300 mg capsule Commonly known as:  NEURONTIN  
TAKE ONE CAPSULE BY MOUTH 3 TIMES A DAY  
  
 glipiZIDE SR 5 mg CR tablet Commonly known as:  GLUCOTROL XL  
TAKE 1 TAB BY MOUTH DAILY. glucose blood VI test strips strip Commonly known as:  ASCENSIA AUTODISC VI, ONE TOUCH ULTRA TEST VI Test sugars once daily as directed HAIR,SKIN AND NAILS PO Take 1 Tab by mouth daily. JARDIANCE 25 mg tablet Generic drug:  empagliflozin TAKE 1 TABLET BY MOUTH EVERY DAY Lancets Misc Use to test blood sugars once daily  
  
 levothyroxine 137 mcg tablet Commonly known as:  SYNTHROID  
TAKE 137 MCG BY MOUTH DAILY (BEFORE BREAKFAST). loratadine 10 mg tablet Commonly known as:  Lu [place] Take 1 Tab by mouth daily. losartan 100 mg tablet Commonly known as:  COZAAR  
TAKE 1 TABLET BY MOUTH DAILY  
  
 meloxicam 15 mg tablet Commonly known as:  MOBIC  
 Take 1 Tab by mouth daily. metFORMIN  mg tablet Commonly known as:  GLUCOPHAGE XR  
TAKE 2 TABLETS BY MOUTH EVERY MORNING AND 1 TABLET AT NIGHT  
  
 methylphenidate HCl 20 mg tablet Commonly known as:  RITALIN Take 2 Tabs by mouth two (2) times a day. pantoprazole 40 mg tablet Commonly known as:  PROTONIX  
TAKE 1 TAB BY MOUTH DAILY. QUEtiapine 300 mg tablet Commonly known as:  SEROquel Take 1 Tab by mouth two (2) times a day. rOPINIRole 4 mg Tab TAB Commonly known as:  REQUIP  
TAKE 1 TABLET BY MOUTH NIGHTLY  
  
 simvastatin 5 mg tablet Commonly known as:  ZOCOR  
TAKE 1 TAB BY MOUTH NIGHTLY FOR 30 DAYS. traZODone 100 mg tablet Commonly known as:  DESYREL  
TAKE 3 TABLETS BY MOUTH AT BEDTIME  
  
 triamcinolone 55 mcg nasal inhaler Commonly known as:  NASACORT AQ  
2 Sprays by Both Nostrils route daily. Patient Instructions Allergies: Care Instructions Your Care Instructions Allergies occur when your body's defense system (immune system) overreacts to certain substances. The immune system treats a harmless substance as if it were a harmful germ or virus. Many things can cause this overreaction, including pollens, medicine, food, dust, animal dander, and mold. Allergies can be mild or severe. Mild allergies can be managed with home treatment. But medicine may be needed to prevent problems. Managing your allergies is an important part of staying healthy. Your doctor may suggest that you have allergy testing to help find out what is causing your allergies. When you know what things trigger your symptoms, you can avoid them. This can prevent allergy symptoms and other health problems. For severe allergies that cause reactions that affect your whole body (anaphylactic reactions), your doctor may prescribe a shot of epinephrine to carry with you in case you have a severe reaction.  Learn how to give yourself the shot and keep it with you at all times. Make sure it is not . Follow-up care is a key part of your treatment and safety. Be sure to make and go to all appointments, and call your doctor if you are having problems. It's also a good idea to know your test results and keep a list of the medicines you take. How can you care for yourself at home? · If you have been told by your doctor that dust or dust mites are causing your allergy, decrease the dust around your bed: 
[facility] AUTHOR[employer] sheets, pillowcases, and other bedding in hot water every week. ¨ Use dust-proof covers for pillows, duvets, and mattresses. Avoid plastic covers because they tear easily and do not \"breathe. \" Wash as instructed on the label. ¨ Do not use any blankets and pillows that you do not need. ¨ Use blankets that you can wash in your washing machine. ¨ Consider removing drapes and carpets, which attract and hold dust, from your bedroom. · If you are allergic to house dust and mites, do not use home humidifiers. Your doctor can suggest ways you can control dust and mites. · Look for signs of cockroaches. Cockroaches cause allergic reactions. Use cockroach baits to get rid of them. Then, clean your home well. Cockroaches like areas where grocery bags, newspapers, empty bottles, or cardboard boxes are stored. Do not keep these inside your home, and keep trash and food containers sealed. Seal off any spots where cockroaches might enter your home. · If you are allergic to mold, get rid of furniture, rugs, and drapes that smell musty. Check for mold in the bathroom. · If you are allergic to outdoor pollen or mold spores, use air-conditioning. Change or clean all filters every month. Keep windows closed. · If you are allergic to pollen, stay inside when pollen counts are high. Use a vacuum  with a HEPA filter or a double-thickness filter at least two times each week. · Stay inside when air pollution is bad. Avoid paint fumes, perfumes, and other strong odors. · Avoid conditions that make your allergies worse. Stay away from smoke. Do not smoke or let anyone else smoke in your house. Do not use fireplaces or wood-burning stoves. · If you are allergic to your pets, change the air filter in your furnace every month. Use high-efficiency filters. · If you are allergic to pet dander, keep pets outside or out of your bedroom. Old carpet and cloth furniture can hold a lot of animal dander. You may need to replace them. When should you call for help? Give an epinephrine shot if: 
  · You think you are having a severe allergic reaction.  
  · You have symptoms in more than one body area, such as mild nausea and an itchy mouth.  
 After giving an epinephrine shot call 911, even if you feel better. 
 Call 911 if: 
  · You have symptoms of a severe allergic reaction. These may include: 
¨ Sudden raised, red areas (hives) all over your body. ¨ Swelling of the throat, mouth, lips, or tongue. ¨ Trouble breathing. ¨ Passing out (losing consciousness). Or you may feel very lightheaded or suddenly feel weak, confused, or restless.  
  · You have been given an epinephrine shot, even if you feel better.  
 Call your doctor now or seek immediate medical care if: 
  · You have symptoms of an allergic reaction, such as: ¨ A rash or hives (raised, red areas on the skin). ¨ Itching. ¨ Swelling. ¨ Belly pain, nausea, or vomiting.  
 Watch closely for changes in your health, and be sure to contact your doctor if: 
  · You do not get better as expected. Where can you learn more? Go to http://jw-monie.info/. Enter F952 in the search box to learn more about \"Allergies: Care Instructions. \" Current as of: October 6, 2017 Content Version: 11.7 © 7780-5266 [employer].  Care instructions adapted under license by [facility] [address] (which disclaims liability or warranty for this information). If you have questions about a medical condition or this instruction, always ask your healthcare professional. [address] any warranty or liability for your use of this information. Earwax Blockage: Care Instructions Your Care Instructions Earwax is a natural substance that protects the ear canal. Normally, earwax drains from the ears and does not cause problems. Sometimes earwax builds up and hardens. Earwax blockage (also called cerumen impaction) can cause some loss of hearing and pain. When wax is tightly packed, you will need to have your doctor remove it. Follow-up care is a key part of your treatment and safety. Be sure to make and go to all appointments, and call your doctor if you are having problems. It's also a good idea to know your test results and keep a list of the medicines you take. How can you care for yourself at home? · Do not try to remove earwax with cotton swabs, fingers, or other objects. This can make the blockage worse and damage the eardrum. · If your doctor recommends that you try to remove earwax at home: ¨ Soften and loosen the earwax with warm mineral oil. You also can try hydrogen peroxide mixed with an equal amount of room temperature water. Place 2 drops of the fluid, warmed to body temperature, in the ear two times a day for up to 5 days. ¨ Once the wax is loose and soft, all that is usually needed to remove it from the ear canal is a gentle, warm shower. Direct the water into the ear, then tip your head to let the earwax drain out. Dry your ear thoroughly with a hair dryer set on low. Hold the dryer several inches from your ear. ¨ If the warm mineral oil and shower do not work, use an over-the-counter wax softener. Read and follow all instructions on the label. After using the wax softener, use an ear syringe to gently flush the ear.  Make sure the flushing solution is body temperature. Cool or hot fluids in the ear can cause dizziness. When should you call for help? Call your doctor now or seek immediate medical care if: 
  · Pus or blood drains from your ear.  
  · Your ears are ringing or feel full.  
  · You have a loss of hearing.  
 Watch closely for changes in your health, and be sure to contact your doctor if: 
  · You have pain or reduced hearing after 1 week of home treatment.  
  · You have any new symptoms, such as nausea or balance problems. Where can you learn more? Go to http://jw-monie.info/. Enter Y980 in the search box to learn more about \"Earwax Blockage: Care Instructions. \" Current as of: November 20, 2017 Content Version: 11.7 © 5199-9728 [employer]. Care instructions adapted under license by [employer] (which disclaims liability or warranty for this information). If you have questions about a medical condition or this instruction, always ask your healthcare professional. [place] [address] any warranty or liability for your use of this information. Eustachian Tube Problems: Care Instructions Your Care Instructions The eustachian (say \"you-STAY-shee-un\") tubes run between the inside of the ears and the throat. They keep air pressure stable in the ears. If your eustachian tubes become blocked, the air pressure in your ears changes. The fluids from a cold can clog eustachian tubes, causing pain in the ears. A quick change in air pressure can cause eustachian tubes to close up. This might happen when an airplane changes altitude or when a  goes up or down underwater. Eustachian tube problems often clear up on their own or after antibiotic treatment. If your tubes continue to be blocked, you may need surgery. Follow-up care is a key part of your treatment and safety.  Be sure to make and go to all appointments, and call your doctor if you are having problems. It's also a good idea to know your test results and keep a list of the medicines you take. How can you care for yourself at home? · To ease ear pain, apply a warm washcloth or a heating pad set on low. There may be some drainage from the ear when the heat melts earwax. Put a cloth between the heat source and your skin. Do not use a heating pad with children. · If your doctor prescribed antibiotics, take them as directed. Do not stop taking them just because you feel better. You need to take the full course of antibiotics. · Your doctor may recommend over-the-counter medicine. Be safe with medicines. Oral or nasal decongestants may relieve ear pain. Avoid decongestants that are combined with antihistamines, which tend to cause more blockage. But if allergies seem to be the problem, your doctor may recommend a combination. Be careful with cough and cold medicines. Don't give them to children younger than 6, because they don't work for children that age and can even be harmful. For children 6 and older, always follow all the instructions carefully. Make sure you know how much medicine to give and how long to use it. And use the dosing device if one is included. When should you call for help? Call your doctor now or seek immediate medical care if: 
  · You develop sudden, complete hearing loss.  
  · You have severe pain or feel dizzy.  
  · You have new or increasing pus or blood draining from your ear.  
  · You have redness, swelling, or pain around or behind the ear.  
 Watch closely for changes in your health, and be sure to contact your doctor if: 
  · You do not get better after 2 weeks.  
  · You have any new symptoms, such as itching or a feeling of fullness in the ear. Where can you learn more? Go to http://jw-monie.info/.  
Enter Y822 in the search box to learn more about \"Eustachian Tube Problems: Care Instructions. \" Current as of: May 12, 2017 Content Version: 11.7 © 6383-0746 [employer]. Care instructions adapted under license by [employer] (which disclaims liability or warranty for this information). If you have questions about a medical condition or this instruction, always ask your healthcare professional. [address]  any warranty or liability for your use of this information. Introducing [facility]! [facility] introduces BitDefender patient portal. Now you can access parts of your medical record, email your doctor's office, and request medication refills online. 1. In your internet browser, go to [email or web address] 2. Click on the First Time User? Click Here link in the Sign In box. You will see the New Member Sign Up page. 3. Enter your BitDefender Access Code exactly as it appears below. You will not need to use this code after youve completed the sign-up process. If you do not sign up before the expiration date, you must request a new code. · BitDefender Access Code: [email or web address] Expires: 11/19/2018  2:43 PM 
 
4. Enter the last four digits of your Social Security Number (xxxx) and Date of Birth (mm/dd/yyyy) as indicated and click Submit. You will be taken to the next sign-up page. 5. Create a BitDefender ID. This will be your BitDefender login ID and cannot be changed, so think of one that is secure and easy to remember. 6. Create a BitDefender password. You can change your password at any time. 7. Enter your Password Reset Question and Answer. This can be used at a later time if you forget your password. 8. Enter your e-mail address. You will receive e-mail notification when new information is available in [address] 9. Click Sign Up. You can now view and download portions of your medical record. 10. Click the Download Summary menu link to download a portable copy of your medical information. If you have questions, please visit the Frequently Asked Questions section of the Problems[employer]t website. Remember, [employer] is NOT to be used for urgent needs. For medical emergencies, dial 911. Now available from your iPhone and Android! Please provide this summary of care documentation to your next provider. Your primary care clinician is listed as Chanell Larson. If you have any questions after today's visit, please call [phone].

## 2018-08-21 NOTE — PATIENT INSTRUCTIONS
Allergies: Care Instructions  Your Care Instructions    Allergies occur when your body's defense system (immune system) overreacts to certain substances. The immune system treats a harmless substance as if it were a harmful germ or virus. Many things can cause this overreaction, including pollens, medicine, food, dust, animal dander, and mold. Allergies can be mild or severe. Mild allergies can be managed with home treatment. But medicine may be needed to prevent problems. Managing your allergies is an important part of staying healthy. Your doctor may suggest that you have allergy testing to help find out what is causing your allergies. When you know what things trigger your symptoms, you can avoid them. This can prevent allergy symptoms and other health problems. For severe allergies that cause reactions that affect your whole body (anaphylactic reactions), your doctor may prescribe a shot of epinephrine to carry with you in case you have a severe reaction. Learn how to give yourself the shot and keep it with you at all times. Make sure it is not . Follow-up care is a key part of your treatment and safety. Be sure to make and go to all appointments, and call your doctor if you are having problems. It's also a good idea to know your test results and keep a list of the medicines you take. How can you care for yourself at home? · If you have been told by your doctor that dust or dust mites are causing your allergy, decrease the dust around your bed:  [facility] sheets, pillowcases, and other bedding in hot water every week. ¨ Use dust-proof covers for pillows, duvets, and mattresses. Avoid plastic covers because they tear easily and do not \"breathe. \" Wash as instructed on the label. ¨ Do not use any blankets and pillows that you do not need. ¨ Use blankets that you can wash in your washing machine. ¨ Consider removing drapes and carpets, which attract and hold dust, from your bedroom.   · If you are allergic to house dust and mites, do not use home humidifiers. Your doctor can suggest ways you can control dust and mites. · Look for signs of cockroaches. Cockroaches cause allergic reactions. Use cockroach baits to get rid of them. Then, clean your home well. Cockroaches like areas where grocery bags, newspapers, empty bottles, or cardboard boxes are stored. Do not keep these inside your home, and keep trash and food containers sealed. Seal off any spots where cockroaches might enter your home. · If you are allergic to mold, get rid of furniture, rugs, and drapes that smell musty. Check for mold in the bathroom. · If you are allergic to outdoor pollen or mold spores, use air-conditioning. Change or clean all filters every month. Keep windows closed. · If you are allergic to pollen, stay inside when pollen counts are high. Use a vacuum  with a HEPA filter or a double-thickness filter at least two times each week. · Stay inside when air pollution is bad. Avoid paint fumes, perfumes, and other strong odors. · Avoid conditions that make your allergies worse. Stay away from smoke. Do not smoke or let anyone else smoke in your house. Do not use fireplaces or wood-burning stoves. · If you are allergic to your pets, change the air filter in your furnace every month. Use high-efficiency filters. · If you are allergic to pet dander, keep pets outside or out of your bedroom. Old carpet and cloth furniture can hold a lot of animal dander. You may need to replace them. When should you call for help? Give an epinephrine shot if:    · You think you are having a severe allergic reaction.     · You have symptoms in more than one body area, such as mild nausea and an itchy mouth.    After giving an epinephrine shot call 911, even if you feel better.   Call 911 if:    · You have symptoms of a severe allergic reaction. These may include:  ¨ Sudden raised, red areas (hives) all over your body.   ¨ Swelling of the throat, mouth, lips, or tongue. ¨ Trouble breathing. ¨ Passing out (losing consciousness). Or you may feel very lightheaded or suddenly feel weak, confused, or restless.     · You have been given an epinephrine shot, even if you feel better.    Call your doctor now or seek immediate medical care if:    · You have symptoms of an allergic reaction, such as:  ¨ A rash or hives (raised, red areas on the skin). ¨ Itching. ¨ Swelling. ¨ Belly pain, nausea, or vomiting.    Watch closely for changes in your health, and be sure to contact your doctor if:    · You do not get better as expected. Where can you learn more? Go to http://jw-monie.info/. Enter B311 in the search box to learn more about \"Allergies: Care Instructions. \"  Current as of: October 6, 2017  Content Version: 11.7  © 6519-4035 [employer]. Care instructions adapted under license by [employer] (which disclaims liability or warranty for this information). If you have questions about a medical condition or this instruction, always ask your healthcare professional. [place] [address] any warranty or liability for your use of this information. Earwax Blockage: Care Instructions  Your Care Instructions    Earwax is a natural substance that protects the ear canal. Normally, earwax drains from the ears and does not cause problems. Sometimes earwax builds up and hardens. Earwax blockage (also called cerumen impaction) can cause some loss of hearing and pain. When wax is tightly packed, you will need to have your doctor remove it. Follow-up care is a key part of your treatment and safety. Be sure to make and go to all appointments, and call your doctor if you are having problems. It's also a good idea to know your test results and keep a list of the medicines you take. How can you care for yourself at home? · Do not try to remove earwax with cotton swabs, fingers, or other objects.  This can make the blockage worse and damage the eardrum. · If your doctor recommends that you try to remove earwax at home:  ¨ Soften and loosen the earwax with warm mineral oil. You also can try hydrogen peroxide mixed with an equal amount of room temperature water. Place 2 drops of the fluid, warmed to body temperature, in the ear two times a day for up to 5 days. ¨ Once the wax is loose and soft, all that is usually needed to remove it from the ear canal is a gentle, warm shower. Direct the water into the ear, then tip your head to let the earwax drain out. Dry your ear thoroughly with a hair dryer set on low. Hold the dryer several inches from your ear. ¨ If the warm mineral oil and shower do not work, use an over-the-counter wax softener. Read and follow all instructions on the label. After using the wax softener, use an ear syringe to gently flush the ear. Make sure the flushing solution is body temperature. Cool or hot fluids in the ear can cause dizziness. When should you call for help? Call your doctor now or seek immediate medical care if:    · Pus or blood drains from your ear.     · Your ears are ringing or feel full.     · You have a loss of hearing.    Watch closely for changes in your health, and be sure to contact your doctor if:    · You have pain or reduced hearing after 1 week of home treatment.     · You have any new symptoms, such as nausea or balance problems. Where can you learn more? Go to http://jw-monie.info/. Enter Y294 in the search box to learn more about \"Earwax Blockage: Care Instructions. \"  Current as of: November 20, 2017  Content Version: 11.7  © 7141-4512 [employer]. Care instructions adapted under license by [employer] (which disclaims liability or warranty for this information).  If you have questions about a medical condition or this instruction, always ask your healthcare professional. Gautam Brunner any warranty or liability for your use of this information. Eustachian Tube Problems: Care Instructions  Your Care Instructions    The eustachian (say \"you-STAY-shee-un\") tubes run between the inside of the ears and the throat. They keep air pressure stable in the ears. If your eustachian tubes become blocked, the air pressure in your ears changes. The fluids from a cold can clog eustachian tubes, causing pain in the ears. A quick change in air pressure can cause eustachian tubes to close up. This might happen when an airplane changes altitude or when a  goes up or down underwater. Eustachian tube problems often clear up on their own or after antibiotic treatment. If your tubes continue to be blocked, you may need surgery. Follow-up care is a key part of your treatment and safety. Be sure to make and go to all appointments, and call your doctor if you are having problems. It's also a good idea to know your test results and keep a list of the medicines you take. How can you care for yourself at home? · To ease ear pain, apply a warm washcloth or a heating pad set on low. There may be some drainage from the ear when the heat melts earwax. Put a cloth between the heat source and your skin. Do not use a heating pad with children. · If your doctor prescribed antibiotics, take them as directed. Do not stop taking them just because you feel better. You need to take the full course of antibiotics. · Your doctor may recommend over-the-counter medicine. Be safe with medicines. Oral or nasal decongestants may relieve ear pain. Avoid decongestants that are combined with antihistamines, which tend to cause more blockage. But if allergies seem to be the problem, your doctor may recommend a combination. Be careful with cough and cold medicines. Don't give them to children younger than 6, because they don't work for children that age and can even be harmful.  For children 6 and older, always follow all the instructions carefully. Make sure you know how much medicine to give and how long to use it. And use the dosing device if one is included. When should you call for help? Call your doctor now or seek immediate medical care if:    · You develop sudden, complete hearing loss.     · You have severe pain or feel dizzy.     · You have new or increasing pus or blood draining from your ear.     · You have redness, swelling, or pain around or behind the ear.    Watch closely for changes in your health, and be sure to contact your doctor if:    · You do not get better after 2 weeks.     · You have any new symptoms, such as itching or a feeling of fullness in the ear. Where can you learn more? Go to http://jw-monie.info/. Enter Y822 in the search box to learn more about \"Eustachian Tube Problems: Care Instructions. \"  Current as of: May 12, 2017  Content Version: 11.7  © 9461-9963 [employer]. Care instructions adapted under license by [employer] (which disclaims liability or warranty for this information). If you have questions about a medical condition or this instruction, always ask your healthcare professional. [place] [address] any warranty or liability for your use of this information.

## 2018-08-21 NOTE — PROGRESS NOTES
All health maintenance and other pertinent information has been reviewed in preparation for today's office visit. Patient presents in the office today for:    Chief Complaint   Patient presents with    Ear Pain     Pt c/o of bi-lat ear pain x's 5 days. Also has headaches. 1. Have you been to the ER, urgent care clinic since your last visit? Hospitalized since your last visit? No    2. Have you seen or consulted any other health care providers outside of the [address] [place] [address] since your last visit? Include any pap smears or colon screening.  No

## 2018-09-12 RX ORDER — MELOXICAM 15 MG/1
TABLET ORAL
Qty: 30 TAB | Refills: 4 | Status: SHIPPED | OUTPATIENT
Start: 2018-09-12 | End: 2018-12-11 | Stop reason: SDUPTHER

## 2018-10-13 DIAGNOSIS — I10 ESSENTIAL HYPERTENSION, BENIGN: Chronic | ICD-10-CM

## 2018-10-13 RX ORDER — LOSARTAN POTASSIUM 100 MG/1
TABLET ORAL
Qty: 30 TAB | Refills: 0 | Status: SHIPPED | OUTPATIENT
Start: 2018-10-13 | End: 2018-11-07 | Stop reason: SDUPTHER

## 2018-10-18 ENCOUNTER — TELEPHONE (OUTPATIENT)
Dept: INTERNAL MEDICINE CLINIC | Age: 63
End: 2018-10-18

## 2018-10-28 RX ORDER — ROPINIROLE 4 MG/1
TABLET, FILM COATED ORAL
Qty: 90 TAB | Refills: 0 | Status: SHIPPED | OUTPATIENT
Start: 2018-10-28 | End: 2019-02-20 | Stop reason: SDUPTHER

## 2018-11-02 ENCOUNTER — TELEPHONE (OUTPATIENT)
Dept: INTERNAL MEDICINE CLINIC | Age: 63
End: 2018-11-02

## 2018-11-02 NOTE — TELEPHONE ENCOUNTER
Patient called to request seeing someone in the practice today. Wants to speak with a nurse as soon as possible about Excessive Sweating the past 2 weeks.      Please call back at [phone]

## 2018-11-07 ENCOUNTER — OFFICE VISIT (OUTPATIENT)
Dept: INTERNAL MEDICINE CLINIC | Age: 63
End: 2018-11-07

## 2018-11-07 VITALS
SYSTOLIC BLOOD PRESSURE: 140 MMHG | OXYGEN SATURATION: 94 % | RESPIRATION RATE: 16 BRPM | BODY MASS INDEX: 31.07 KG/M2 | TEMPERATURE: 97.9 F | HEIGHT: 64 IN | WEIGHT: 182 LBS | DIASTOLIC BLOOD PRESSURE: 76 MMHG | HEART RATE: 100 BPM

## 2018-11-07 DIAGNOSIS — I10 ESSENTIAL HYPERTENSION, BENIGN: ICD-10-CM

## 2018-11-07 DIAGNOSIS — K21.9 GASTROESOPHAGEAL REFLUX DISEASE WITHOUT ESOPHAGITIS: ICD-10-CM

## 2018-11-07 DIAGNOSIS — E03.4 HYPOTHYROIDISM DUE TO ACQUIRED ATROPHY OF THYROID: ICD-10-CM

## 2018-11-07 DIAGNOSIS — F43.10 PTSD (POST-TRAUMATIC STRESS DISORDER): ICD-10-CM

## 2018-11-07 DIAGNOSIS — E11.21 TYPE 2 DIABETES WITH NEPHROPATHY (HCC): Primary | ICD-10-CM

## 2018-11-07 NOTE — PROGRESS NOTES
HISTORY OF PRESENT ILLNESS Patterson Bamberger is a 58 y.o. female. HPI Hypertension ROS: taking medications as instructed, no medication side effects noted, no TIA's, no chest pain on exertion, no dyspnea on exertion, no swelling of ankles. New concerns:  Patient's BP in office today is 153/83. She reports BP of 146/76 at home. She continues on Losartan and Amlodipine. Hyperlipidemia: 
Cardiovascular risk analysis - 58 y.o. female LDL goal is under 130. ROS: taking medications as instructed, no medication side effects noted, no TIA's, no chest pain on exertion, no dyspnea on exertion, no swelling of ankles. Tolerating meds, no myalgias or other side effects noted New concerns: Her last LDL was 85 on 12/01/17. Pt continues on Zocor. Diabetic Review of Systems - medication compliance: compliant all of the time, diabetic diet compliance: compliant most of the time, home glucose monitoring: is performed rarely, further diabetic ROS: no polyuria or polydipsia, no chest pain, dyspnea or TIA's, no numbness, tingling or pain in extremities. Other symptoms and concerns: Pt continues on Metformin and Jardiance. She doesn't check sugar levels at home. PTSD: Stable, and well-managed with current meds. Pt continues to f/u with therapist/psychiatrist every 2-3 months. She also follows up with different psychiatrist for family counseling. Mood: Pt reports stress from daughter and caring for . hypothyroidism. Lab Results Component Value Date/Time TSH 1.110 12/01/2017 09:39 AM  
 
Thyroid ROS: denies fatigue, weight changes, heat/cold intolerance, bowel/skin changes or CVS symptoms. Pt c/o excessive sweating. GERD: Stable, and well-managed with Protonix. Review of Systems All other systems reviewed and are negative. Physical Exam  
Constitutional: She is oriented to person, place, and time. She appears well-developed and well-nourished.   
HENT:  
 Head: Normocephalic and atraumatic. Right Ear: External ear normal.  
Left Ear: External ear normal.  
Nose: Nose normal.  
Mouth/Throat: Oropharynx is clear and moist.  
Eyes: Conjunctivae and EOM are normal.  
Neck: Normal range of motion. Neck supple. Carotid bruit is not present. No thyroid mass and no thyromegaly present. Cardiovascular: Normal rate, regular rhythm, S1 normal, S2 normal, normal heart sounds and intact distal pulses. Pulmonary/Chest: Effort normal and breath sounds normal.  
Abdominal: Soft. Normal appearance and bowel sounds are normal. There is no hepatosplenomegaly. There is no tenderness. Musculoskeletal: Normal range of motion. Neurological: She is alert and oriented to person, place, and time. She has normal strength. No cranial nerve deficit or sensory deficit. Coordination normal.  
Skin: Skin is warm, dry and intact. No abrasion and no rash noted. Psychiatric: She has a normal mood and affect. Her behavior is normal. Judgment and thought content normal.  
Nursing note and vitals reviewed. ASSESSMENT and PLAN Diagnoses and all orders for this visit: 
 
1. Type 2 diabetes with nephropathy (HCC) Sugars stable. Continue to follow diabetic diet and monitor sugars.  
-     MICROALBUMIN, UR, RAND W/ MICROALB/CREAT RATIO 
-     HEMOGLOBIN A1C WITH EAG 
-     LIPID PANEL 2. PTSD (post-traumatic stress disorder) Stable, and doing well. Continue current medications. Pt will continue to f/u with therapist/psychiatrist every 2-3 months. 3. Essential hypertension, benign BP is at goal. I do not recommend any change in medications. 
-     METABOLIC PANEL, COMPREHENSIVE 4. Hypothyroidism due to acquired atrophy of thyroid Thyroid stable. I do not recommend a change in medications. Will monitor for lab results, to r/o underlying etiologies of excessive sweating.  
-     TSH 3RD GENERATION 
-     T4, FREE 5. BMI 31.0-31.9,adult Stable condition. Encouraged pt to exercise regularly and monitor diet. 6. Gastroesophageal reflux disease without esophagitis Stable, and well-managed. No change in medications. Lab results and schedule of future lab studies reviewed with patient. Reviewed diet, exercise and weight control. This note will not be viewable in [address] Written by Chriss Galeano, as dictated by Halle Soto MD.  
 
Current diagnosis and concerns discussed with pt at length. Understands risks and benefits or current treatment plan and medications and accepts the treatment and medication with any possible risks. Pt asks appropriate questions which were answered. Pt instructed to call with any concerns or problems.

## 2018-11-08 LAB
ALBUMIN SERPL-MCNC: 4.4 G/DL (ref 3.6–4.8)
ALBUMIN/CREAT UR: 99.8 MG/G CREAT (ref 0–30)
ALBUMIN/GLOB SERPL: 1.5 {RATIO} (ref 1.2–2.2)
ALP SERPL-CCNC: 96 IU/L (ref 39–117)
ALT SERPL-CCNC: 33 IU/L (ref 0–32)
AST SERPL-CCNC: 24 IU/L (ref 0–40)
BILIRUB SERPL-MCNC: <0.2 MG/DL (ref 0–1.2)
BUN SERPL-MCNC: 17 MG/DL (ref 8–27)
BUN/CREAT SERPL: 23 (ref 12–28)
CALCIUM SERPL-MCNC: 9.5 MG/DL (ref 8.7–10.3)
CHLORIDE SERPL-SCNC: 103 MMOL/L (ref 96–106)
CHOLEST SERPL-MCNC: 212 MG/DL (ref 100–199)
CO2 SERPL-SCNC: 21 MMOL/L (ref 20–29)
CREAT SERPL-MCNC: 0.73 MG/DL (ref 0.57–1)
CREAT UR-MCNC: 42.1 MG/DL
EST. AVERAGE GLUCOSE BLD GHB EST-MCNC: 183 MG/DL
GLOBULIN SER CALC-MCNC: 2.9 G/DL (ref 1.5–4.5)
GLUCOSE SERPL-MCNC: 189 MG/DL (ref 65–99)
HBA1C MFR BLD: 8 % (ref 4.8–5.6)
HDLC SERPL-MCNC: 52 MG/DL
INTERPRETATION, 910389: NORMAL
LDLC SERPL CALC-MCNC: 101 MG/DL (ref 0–99)
Lab: NORMAL
MICROALBUMIN UR-MCNC: 42 UG/ML
POTASSIUM SERPL-SCNC: 4.6 MMOL/L (ref 3.5–5.2)
PROT SERPL-MCNC: 7.3 G/DL (ref 6–8.5)
SODIUM SERPL-SCNC: 140 MMOL/L (ref 134–144)
T4 FREE SERPL-MCNC: 1.55 NG/DL (ref 0.82–1.77)
TRIGL SERPL-MCNC: 293 MG/DL (ref 0–149)
TSH SERPL DL<=0.005 MIU/L-ACNC: 0.66 UIU/ML (ref 0.45–4.5)
VLDLC SERPL CALC-MCNC: 59 MG/DL (ref 5–40)

## 2018-11-29 ENCOUNTER — OFFICE VISIT (OUTPATIENT)
Dept: OBGYN CLINIC | Age: 63
End: 2018-11-29

## 2018-11-29 VITALS
HEIGHT: 64 IN | SYSTOLIC BLOOD PRESSURE: 134 MMHG | DIASTOLIC BLOOD PRESSURE: 80 MMHG | WEIGHT: 177.6 LBS | BODY MASS INDEX: 30.32 KG/M2

## 2018-11-29 DIAGNOSIS — R39.89 URINE TROUBLES: Primary | ICD-10-CM

## 2018-11-29 DIAGNOSIS — N32.9 BLADDER TROUBLES: ICD-10-CM

## 2018-11-29 DIAGNOSIS — E03.4 HYPOTHYROIDISM DUE TO ACQUIRED ATROPHY OF THYROID: ICD-10-CM

## 2018-11-29 DIAGNOSIS — Z85.3 PERSONAL HISTORY OF BREAST CANCER: ICD-10-CM

## 2018-11-29 DIAGNOSIS — Z01.411 ENCOUNTER FOR GYNECOLOGICAL EXAMINATION (GENERAL) (ROUTINE) WITH ABNORMAL FINDINGS: ICD-10-CM

## 2018-11-29 DIAGNOSIS — I10 ESSENTIAL HYPERTENSION, BENIGN: ICD-10-CM

## 2018-11-29 DIAGNOSIS — E11.21 TYPE 2 DIABETES WITH NEPHROPATHY (HCC): ICD-10-CM

## 2018-11-29 LAB
BILIRUB UR QL STRIP: NEGATIVE
GLUCOSE UR-MCNC: NEGATIVE MG/DL
KETONES P FAST UR STRIP-MCNC: NEGATIVE MG/DL
PH UR STRIP: NORMAL [PH] (ref 4.6–8)
PROT UR QL STRIP: NEGATIVE
SP GR UR STRIP: NORMAL (ref 1–1.03)
UA UROBILINOGEN AMB POC: NORMAL (ref 0.2–1)
URINALYSIS CLARITY POC: CLEAR
URINALYSIS COLOR POC: YELLOW
URINE BLOOD POC: NEGATIVE
URINE LEUKOCYTES POC: NEGATIVE
URINE NITRITES POC: NEGATIVE

## 2018-11-29 NOTE — PATIENT INSTRUCTIONS
Well Visit, Women 48 to 72: Care Instructions  Your Care Instructions    Physical exams can help you stay healthy. Your doctor has checked your overall health and may have suggested ways to take good care of yourself. He or she also may have recommended tests. At home, you can help prevent illness with healthy eating, regular exercise, and other steps. Follow-up care is a key part of your treatment and safety. Be sure to make and go to all appointments, and call your doctor if you are having problems. It's also a good idea to know your test results and keep a list of the medicines you take. How can you care for yourself at home? · Reach and stay at a healthy weight. This will lower your risk for many problems, such as obesity, diabetes, heart disease, and high blood pressure. · Get at least 30 minutes of exercise on most days of the week. Walking is a good choice. You also may want to do other activities, such as running, swimming, cycling, or playing tennis or team sports. · Do not smoke. Smoking can make health problems worse. If you need help quitting, talk to your doctor about stop-smoking programs and medicines. These can increase your chances of quitting for good. · Protect your skin from too much sun. When you're outdoors from 10 a.m. to 4 p.m., stay in the shade or cover up with clothing and a hat with a wide brim. Wear sunglasses that block UV rays. Even when it's cloudy, put broad-spectrum sunscreen (SPF 30 or higher) on any exposed skin. · See a dentist one or two times a year for checkups and to have your teeth cleaned. · Wear a seat belt in the car. · Limit alcohol to 1 drink a day. Too much alcohol can cause health problems. Follow your doctor's advice about when to have certain tests. These tests can spot problems early. · Cholesterol.  Your doctor will tell you how often to have this done based on your age, family history, or other things that can increase your risk for heart attack and stroke. · Blood pressure. Have your blood pressure checked during a routine doctor visit. Your doctor will tell you how often to check your blood pressure based on your age, your blood pressure results, and other factors. · Mammogram. Ask your doctor how often you should have a mammogram, which is an X-ray of your breasts. A mammogram can spot breast cancer before it can be felt and when it is easiest to treat. · Pap test and pelvic exam. Ask your doctor how often you should have a Pap test. You may not need to have a Pap test as often as you used to. · Vision. Have your eyes checked every year or two or as often as your doctor suggests. Some experts recommend that you have yearly exams for glaucoma and other age-related eye problems starting at age 48. · Hearing. Tell your doctor if you notice any change in your hearing. You can have tests to find out how well you hear. · Diabetes. Ask your doctor whether you should have tests for diabetes. · Colon cancer. You should begin tests for colon cancer at age 48. You may have one of several tests. Your doctor will tell you how often to have tests based on your age and risk. Risks include whether you already had a precancerous polyp removed from your colon or whether your parents, sisters and brothers, or children have had colon cancer. · Thyroid disease. Talk to your doctor about whether to have your thyroid checked as part of a regular physical exam. Women have an increased chance of a thyroid problem. · Osteoporosis. You should begin tests for bone density at age 72. If you are younger than 72, ask your doctor whether you have factors that may increase your risk for this disease. You may want to have this test before age 72. · Heart attack and stroke risk. At least every 4 to 6 years, you should have your risk for heart attack and stroke assessed.  Your doctor uses factors such as your age, blood pressure, cholesterol, and whether you smoke or have diabetes to show what your risk for a heart attack or stroke is over the next 10 years. When should you call for help? Watch closely for changes in your health, and be sure to contact your doctor if you have any problems or symptoms that concern you. Where can you learn more? Go to http://jw-monie.info/. Enter G269 in the search box to learn more about \"Well Visit, Women 50 to 72: Care Instructions. \"  Current as of: March 29, 2018  Content Version: 11.8  © 6540-3072 [employer]. Care instructions adapted under license by [employer] (which disclaims liability or warranty for this information). If you have questions about a medical condition or this instruction, always ask your healthcare professional. [address] any warranty or liability for your use of this information.

## 2018-11-29 NOTE — PROGRESS NOTES
[facility]  [email or web address]  [phone]    Wilbert Stevenson MD, [address]       Annual Gynecologic Exam:   [facility] 60+  Chief Complaint   Patient presents with    Well Woman         Servando Nascimento is a 58 y.o. No obstetric history on file. WHITE OR   female who presents for an annual exam.    She does report additional concerns today. Reports that sometimes she has to [employer] forward to get the pee stream started, but once it's started, it is good to go\". Sexual history and Contraception:  Social History     Substance and Sexual Activity   Sexual Activity No     She does not reports new sexual partner(s) in the last year. Preventive Medicine History:  Her most recent Pap smear result: normal was obtained in July 2016  Her most recent HR HPV screen was Negative obtained in 2016    She does not have a history of GABO 2, 3 or cervical cancer. Breast health:  Last mammogram: approximate date 8/8/16 and was normal.   A mammogram was scheduled for today. Breast cancer family updated: see . Bone health: [place] She has not had a bone density scan in the past.   Last bone density test results: n/a. She does not have a history of osteopenia/osteoporosis. Osteoporosis family history updated: see .      Past Medical History:   Diagnosis Date    ADD (attention deficit disorder) 8/17/2009    Breast cancer ([address]     Depressive disorder, not elsewhere classified 8/17/2009    Essential hypertension, benign 8/17/2009    Hypertension     Pap smear for cervical cancer screening 07/28/2016    negative, HPV negative    Psychiatric disorder 2007    panic attacks/PTSD    PTSD (post-traumatic stress disorder)     Thyroid disease     Unspecified hypothyroidism 8/17/2009     Past Surgical History:   Procedure Laterality Date    BREAST SURGERY PROCEDURE UNLISTED  January 2012    left breast biopsy    HX GYN      c section    HX HEENT  1992    partial thyroidectomy    HX TONSILLECTOMY      HX UROLOGICAL  1992    Bladder suspension     Family History   Problem Relation Age of Onset    Diabetes Mother     Cancer Mother         breast    Stroke Mother     Hypertension Father    [facility] Elevated Lipids Father     Migraines Father     Dementia Father      Social History     Socioeconomic History    Marital status:      Spouse name: Not on file    Number of children: Not on file    Years of education: Not on file    Highest education level: Not on file   Social Needs    Financial resource strain: Not on file    Food insecurity - worry: Not on file    Food insecurity - inability: Not on file   [place] needs - medical: Not on file   [place] needs - non-medical: Not on file   Occupational History    Not on file   Tobacco Use    Smoking status: Never Smoker    Smokeless tobacco: Never Used   Substance and Sexual Activity    Alcohol use: No     Alcohol/week: 0.0 oz    Drug use: No    Sexual activity: No   Other Topics Concern    Not on file   Social History Narrative    Not on file       No Known Allergies    Current Outpatient Medications   Medication Sig    losartan (COZAAR) 100 mg tablet TAKE 1 TABLET BY MOUTH EVERY DAY    rOPINIRole (REQUIP) 4 mg tab TAB TAKE 1 TABLET BY MOUTH NIGHTLY    metFORMIN ER (GLUCOPHAGE XR) 500 mg tablet TAKE 2 TABLETS BY MOUTH EVERY MORNING AND 1 TABLET AT NIGHT    simvastatin (ZOCOR) 5 mg tablet TAKE 1 TAB BY MOUTH NIGHTLY FOR 30 DAYS.  meloxicam (MOBIC) 15 mg tablet TAKE 1 TAB BY MOUTH DAILY.  loratadine (CLARITIN) 10 mg tablet Take 1 Tab by mouth daily.  pantoprazole (PROTONIX) 40 mg tablet TAKE 1 TAB BY MOUTH DAILY.  canagliflozin (INVOKANA) 300 mg tablet Take 1 Tab by mouth Daily (before breakfast).  glipiZIDE SR (GLUCOTROL XL) 5 mg CR tablet TAKE 1 TAB BY MOUTH DAILY.  levothyroxine (SYNTHROID) 137 mcg tablet TAKE 137 MCG BY MOUTH DAILY (BEFORE BREAKFAST).     amLODIPine (NORVASC) 10 mg tablet TAKE 1 TABLET BY MOUTH EVERY DAY    gabapentin (NEURONTIN) 300 mg capsule TAKE ONE CAPSULE BY MOUTH 3 TIMES A DAY    MULTIVITAMIN WITH MINERALS (HAIR,SKIN AND NAILS PO) Take 1 Tab by mouth daily.  QUEtiapine (SEROQUEL) 300 mg tablet Take 1 Tab by mouth two (2) times a day.  aspirin delayed-release 81 mg tablet Take 81 mg by mouth daily.  citalopram (CELEXA) 40 mg tablet TAKE 1 TABLET BY MOUTH EVERY DAY    traZODone (DESYREL) 100 mg tablet TAKE 3 TABLETS BY MOUTH AT BEDTIME    methylphenidate (RITALIN) 20 mg tablet Take 2 Tabs by mouth two (2) times a day.  ONETOUCH ULTRA BLUE TEST STRIP strip TEST SUGARS ONCE DAILY AS DIRECTED    losartan (COZAAR) 100 mg tablet TAKE 1 TABLET BY MOUTH DAILY    JARDIANCE 25 mg tablet TAKE 1 TABLET BY MOUTH EVERY DAY    Blood-Glucose Meter monitoring kit Use meter to test sugars once daily    Lancets misc Use to test blood sugars once daily    DAILY MULTI-VITAMIN PO Take 1 Tab by mouth daily. No current facility-administered medications for this visit.         Patient Active Problem List   Diagnosis Code    Essential hypertension, benign I10    Hypothyroidism E03.9    ADD (attention deficit disorder) F98.8    Breast cancer ([facility] C50.919    GERD (gastroesophageal reflux disease) K21.9    Postmenopausal Z78.0    PTSD (post-traumatic stress disorder) F43.10    Type 2 diabetes mellitus without complication ([facility] [address] L41.3    Type 2 diabetes with nephropathy ([facility] E11.21       Review of Systems - History obtained from the patient  Constitutional: negative for weight loss, fever, night sweats  HEENT: negative for hearing loss, earache, congestion, snoring, sorethroat  CV: negative for chest pain, palpitations, edema  Resp: negative for cough, shortness of breath, wheezing  GI: negative for change in bowel habits, abdominal pain, black or bloody stools  : negative for frequency, dysuria, hematuria, vaginal discharge  MSK: negative for back pain, joint pain, muscle pain  Breast: negative for breast lumps, nipple discharge, galactorrhea  Skin :negative for itching, rash, hives  Neuro: negative for dizziness, headache, confusion, weakness  Psych: negative for anxiety, depression, change in mood  Heme/lymph: negative for bleeding, bruising, pallor    Physical Exam  Visit Vitals  /80   Ht 5' 4\" (1.626 m)   Wt 177 lb 9.6 oz (80.6 kg)   BMI 30.48 kg/m²       Constitutional  · Appearance: well-nourished, well developed, alert, in no acute distress    HENT  · Head and Face: appears normal    Neck  · Inspection/Palpation: normal appearance, no masses or tenderness  · Lymph Nodes: no lymphadenopathy present  · Thyroid: gland size normal, nontender, no nodules or masses present on palpation    Chest  · Respiratory Effort: breathing unlabored  · Auscultation: normal breath sounds    Cardiovascular  · Heart:  · Auscultation: regular rate and rhythm without murmur    Breasts  · Inspection of Breasts: breasts symmetrical, no skin changes, no discharge present, nipple appearance normal, no skin retraction present  · Palpation of Breasts and Axillae: no masses present on palpation, no breast tenderness, post surgical changes, well healed right UOQ  · Axillary Lymph Nodes: no lymphadenopathy present    Gastrointestinal  · Abdominal Examination: abdomen non-tender to palpation, normal bowel sounds, no masses present  · Liver and spleen: no hepatomegaly present, spleen not palpable  · Hernias: no hernias identified    Genitourinary  · External Genitalia: normal appearance for age, no discharge present, no tenderness present, no inflammatory lesions present, no masses present, with atrophy present  · Vagina: normal vaginal vault with posterior defects, no discharge present, no inflammatory lesions present, no masses present  · Bladder: non-tender to palpation  · Urethra: appears normal  · Cervix: normal   · Uterus: normal size, shape and consistency  · Adnexa: no adnexal tenderness present, no adnexal masses present  · Perineum: perineum within normal limits, no evidence of trauma, no rashes or skin lesions present  · Anus: anus within normal limits, no hemorrhoids present  · Inguinal Lymph Nodes: no lymphadenopathy present    Skin  · General Inspection: no rash, no lesions identified    Neurologic/Psychiatric  · Mental Status:  · Orientation: grossly oriented to person, place and time  · Mood and Affect: mood normal, affect appropriate    Assessment:  58 y.o. No obstetric history on file. for well woman exam  Encounter Diagnoses   Name Primary?  Urine troubles Yes    Bladder troubles     Encounter for gynecological examination (general) (routine) with abnormal findings     Personal history of breast cancer     Type 2 diabetes with nephropathy ([facility]     Essential hypertension, benign     Hypothyroidism due to acquired atrophy of thyroid        Plan:  The patient was counseled about diet, exercise, healthy lifestyle  We discussed current self breast exam and mammogram recommendations  We discussed current pap smear and HR HPV testing guidelines  We recommend follow up in one year for routine annual gynecologic exam or sooner if needed  We recommend follow up with a primary care physician for any chronic medical problems or non-gynecologic concerns    We discussed calcium/vitamin D/weight bearing exercise and osteoporosis prevention and bone density screening recommendations. Handouts were given to the patient    Disc options for prolapse pt declines PT surgery or exercises.          Folllow up:  [x] return for annual well woman exam in one year or sooner if she is having problems  [] follow up and ultrasound  [] mammogram  [] 6 months  [] 6 weeks   []     Orders Placed This Encounter    AMB POC URINALYSIS DIP STICK MANUAL W/O MICRO       Results for orders placed or performed in visit on 11/29/18   AMB POC URINALYSIS DIP STICK MANUAL W/O MICRO   Result Value Ref Range    Color (UA POC) Yellow     Clarity (UA POC) Clear     Glucose (UA POC) Negative Negative    Bilirubin (UA POC) Negative Negative    Ketones (UA POC) Negative Negative    Specific gravity (UA POC)  1.001 - 1.035    Blood (UA POC) Negative Negative    pH (UA POC)  4.6 - 8.0    Protein (UA POC) Negative Negative    Urobilinogen (UA POC) normal 0.2 - 1    Nitrites (UA POC) Negative Negative    Leukocyte esterase (UA POC) Negative Negative   Results for orders placed or performed in visit on 11/29/18   [facility] 3D NATHANIEL W MAMMO BI SCREENING INCL CAD    Narrative    STUDY: Bilateral digital screening mammogram with 3-D tomosynthesis    INDICATION:  Screening. COMPARISON:  [id number]    BREAST COMPOSITION:  There are scattered areas of fibroglandular density. TECHNIQUE: Bilateral digital screening mammography was performed and is  interpreted in conjunction with a computer assisted detection (CAD) system. Dottie Rosas FINDINGS:      There is a group of calcifications in the upper, outer quadrant of the right  breast, middle 3rd. There is no significant mammographic finding in the contralateral breast.  Additional imaging evaluation needed, with spot magnification views. .    Impression    IMPRESSION:    Incomplete; needs additional imaging evaluation. BI-RADS category 0.     Patient will be recalled for a diagnostic right mammogram.

## 2018-11-30 DIAGNOSIS — R92.8 ABNORMAL MAMMOGRAM OF RIGHT BREAST: Primary | ICD-10-CM

## 2018-12-04 ENCOUNTER — TELEPHONE (OUTPATIENT)
Dept: INTERNAL MEDICINE CLINIC | Age: 63
End: 2018-12-04

## 2018-12-04 NOTE — TELEPHONE ENCOUNTER
Regarding: FW:Casst After Visit Follow-Up Message. Contact: [phone]  Can you help with this?  ----- Message -----  From: Ulysses Butters  Sent: 12/3/2018   3:49 PM  To: [facility] Nurses Pool  Subject: RE:Usmanhart After Visit Follow-Up Message. ----- Message from [address] [place] [address] 951, [facility] sent at 12/3/2018  3:49 PM EST -----    Can you write a script for me to get the blood sugar monitor that you wear on you and can get automatic reading on an roldan on my phone. It would helpful to me to see the in writing. Please let me know.   ----- Message -----  From: Kaylynn Horner MD  Sent: 11/10/2018 12:00 AM EST  To: Ulysses Butters  Subject: Casst After Visit Follow-Up Message. Keith SorianoJosie Sykes,    Thank you for your recent visit to [facility]. Your health is important to us and we are privileged to be your healthcare provider and partner. If you have follow-up questions regarding your treatment plan from todays visit, please contact us through the [facility] [employer] Patient Portal by replying to this message. In the near future, you may receive a survey about your experience with us. We hope you will take the time to let us know how we did so we can continue to improve the care you receive.       Thank you,    [facility]

## 2018-12-04 NOTE — TELEPHONE ENCOUNTER
Called patient, IDx2. Patient interested in freestyle [place] device. Advised that we would send in to her pharmacy and see if it is covered. If it is not covered, advised patient to call me back and we would see if the Dexcom G6 is covered. If that is also not covered, we will pursue a prior authorization. Patient verbalized understanding and will let me know.      Orders Placed This Encounter    flash glucose scanning reader (FREESTYLE MICHAEL 14 DAY READER) misc     Sig: Use to check blood sugars throughout the day     Dispense:  1 Each     Refill:  0    flash glucose sensor (FREESTYLE MICHAEL 14 DAY SENSOR) kit     Sig: Apply 1 sensor every 14 days to check blood sugars     Dispense:  2 Kit     Refill:  3       Tony Bajwa, PharmD, BCPS, CDE

## 2018-12-05 ENCOUNTER — TELEPHONE (OUTPATIENT)
Dept: INTERNAL MEDICINE CLINIC | Age: 63
End: 2018-12-05

## 2018-12-05 DIAGNOSIS — E11.9 TYPE 2 DIABETES MELLITUS WITHOUT COMPLICATION, WITHOUT LONG-TERM CURRENT USE OF INSULIN (HCC): ICD-10-CM

## 2018-12-05 RX ORDER — METFORMIN HYDROCHLORIDE 500 MG/1
TABLET, EXTENDED RELEASE ORAL
Qty: 90 TAB | Refills: 3 | Status: SHIPPED | OUTPATIENT
Start: 2018-12-05 | End: 2019-03-03 | Stop reason: SDUPTHER

## 2018-12-07 ENCOUNTER — TELEPHONE (OUTPATIENT)
Dept: INTERNAL MEDICINE CLINIC | Age: 63
End: 2018-12-07

## 2018-12-07 NOTE — TELEPHONE ENCOUNTER
Called patient, IDx2. She was able to get the freestyle jamie meter and is using it. Has significnatly changed her diet and eating less carbs and more non-starchy vegetables. States that none of her sugars throughout the day have been over 180 and majority in the morning are in goal range of . Advised patient to call me if her blood sugars start to increase or get out of goal range. She is really busy this month but will come in next month to meet with me. Will call her after the first of the year to set something up and she should have her husbands schedule for adult day care figured out at that point.     Emely Iverson, KvngD, BCPS, CDE

## 2018-12-07 NOTE — TELEPHONE ENCOUNTER
----- Message from Erika Cox MD sent at 11/8/2018  3:46 PM EST -----  Please call her and discuss her labs? What else can we do ?  Can you get her in

## 2018-12-11 RX ORDER — MELOXICAM 15 MG/1
TABLET ORAL
Qty: 90 TAB | Refills: 0 | Status: SHIPPED | OUTPATIENT
Start: 2018-12-11 | End: 2019-03-20 | Stop reason: SDUPTHER

## 2018-12-13 ENCOUNTER — HOSPITAL ENCOUNTER (OUTPATIENT)
Dept: MAMMOGRAPHY | Age: 63
Discharge: HOME | End: 2018-12-13
Attending: OBSTETRICS & GYNECOLOGY
Payer: COMMERCIAL

## 2018-12-13 DIAGNOSIS — R92.8 ABNORMAL MAMMOGRAM: ICD-10-CM

## 2018-12-13 PROCEDURE — 77061 BREAST TOMOSYNTHESIS UNI: CPT

## 2018-12-19 NOTE — PROGRESS NOTES
Abnormal results. Please notify pt.   Tickle right diag mmg 6mos, or pt can do breast consult b/c h/o breast cancer if prefers

## 2019-01-31 DIAGNOSIS — E11.21 TYPE 2 DIABETES WITH NEPHROPATHY (HCC): ICD-10-CM

## 2019-01-31 RX ORDER — CANAGLIFLOZIN 300 MG/1
TABLET, FILM COATED ORAL
Qty: 90 TAB | Refills: 1 | Status: SHIPPED | OUTPATIENT
Start: 2019-01-31 | End: 2019-03-11 | Stop reason: CLARIF

## 2019-02-10 RX ORDER — GLIPIZIDE 5 MG/1
TABLET, FILM COATED, EXTENDED RELEASE ORAL
Qty: 90 TAB | Refills: 1 | Status: SHIPPED | OUTPATIENT
Start: 2019-02-10 | End: 2019-08-04 | Stop reason: SDUPTHER

## 2019-02-21 RX ORDER — PANTOPRAZOLE SODIUM 40 MG/1
TABLET, DELAYED RELEASE ORAL
Qty: 90 TAB | Refills: 1 | Status: SHIPPED | OUTPATIENT
Start: 2019-02-21 | End: 2019-08-26 | Stop reason: SDUPTHER

## 2019-02-21 RX ORDER — ROPINIROLE 4 MG/1
TABLET, FILM COATED ORAL
Qty: 90 TAB | Refills: 0 | Status: SHIPPED | OUTPATIENT
Start: 2019-02-21 | End: 2019-10-22 | Stop reason: SDUPTHER

## 2019-02-21 RX ORDER — ROPINIROLE 4 MG/1
4 TABLET, FILM COATED ORAL DAILY
Qty: 90 TAB | Refills: 0 | Status: SHIPPED | OUTPATIENT
Start: 2019-02-21 | End: 2019-07-09 | Stop reason: SDUPTHER

## 2019-03-03 ENCOUNTER — TELEPHONE (OUTPATIENT)
Dept: INTERNAL MEDICINE CLINIC | Age: 64
End: 2019-03-03

## 2019-03-03 DIAGNOSIS — E11.9 TYPE 2 DIABETES MELLITUS WITHOUT COMPLICATION, WITHOUT LONG-TERM CURRENT USE OF INSULIN (HCC): ICD-10-CM

## 2019-03-03 RX ORDER — METFORMIN HYDROCHLORIDE 500 MG/1
TABLET, EXTENDED RELEASE ORAL
Qty: 90 TAB | Refills: 0 | Status: SHIPPED | OUTPATIENT
Start: 2019-03-03 | End: 2019-03-07 | Stop reason: SDUPTHER

## 2019-03-07 DIAGNOSIS — E11.9 TYPE 2 DIABETES MELLITUS WITHOUT COMPLICATION, WITHOUT LONG-TERM CURRENT USE OF INSULIN (HCC): ICD-10-CM

## 2019-03-07 RX ORDER — METFORMIN HYDROCHLORIDE 500 MG/1
TABLET, EXTENDED RELEASE ORAL
Qty: 90 TAB | Refills: 3 | Status: SHIPPED | OUTPATIENT
Start: 2019-03-07 | End: 2019-05-29 | Stop reason: SDUPTHER

## 2019-03-07 NOTE — TELEPHONE ENCOUNTER
Patient is requesting to have her script sent in again , pharmacy has not received it , patient is completley out of medication .

## 2019-03-07 NOTE — TELEPHONE ENCOUNTER
Patient called to report that medication needs to be for a 90 day supply for insurance to cover it.  Patient also stated that it should be called into     [id number]- [place], [place] - [address]      glucophage xr 500 mg tablet

## 2019-03-08 RX ORDER — METFORMIN HYDROCHLORIDE 500 MG/1
TABLET, EXTENDED RELEASE ORAL
Qty: 90 TAB | Refills: 3 | OUTPATIENT
Start: 2019-03-08

## 2019-03-11 ENCOUNTER — TELEPHONE (OUTPATIENT)
Dept: INTERNAL MEDICINE CLINIC | Age: 64
End: 2019-03-11

## 2019-03-11 DIAGNOSIS — E11.9 TYPE 2 DIABETES MELLITUS WITHOUT COMPLICATION, WITHOUT LONG-TERM CURRENT USE OF INSULIN (HCC): ICD-10-CM

## 2019-03-11 NOTE — TELEPHONE ENCOUNTER
Patient states insurance will not cover [facility]. She is requesting Jardiance be sent to [facility]. Thanks.     Best contact: [phone]

## 2019-03-17 DIAGNOSIS — I10 ESSENTIAL HYPERTENSION, BENIGN: Chronic | ICD-10-CM

## 2019-03-17 RX ORDER — LOSARTAN POTASSIUM 100 MG/1
TABLET ORAL
Qty: 30 TAB | Refills: 3 | Status: SHIPPED | OUTPATIENT
Start: 2019-03-17 | End: 2019-07-14 | Stop reason: SDUPTHER

## 2019-03-21 RX ORDER — MELOXICAM 15 MG/1
TABLET ORAL
Qty: 90 TAB | Refills: 0 | Status: SHIPPED | OUTPATIENT
Start: 2019-03-21 | End: 2019-06-17 | Stop reason: SDUPTHER

## 2019-04-09 RX ORDER — FLASH GLUCOSE SENSOR
KIT MISCELLANEOUS
Qty: 2 KIT | Refills: 3 | Status: SHIPPED | OUTPATIENT
Start: 2019-04-09 | End: 2019-10-15 | Stop reason: SDUPTHER

## 2019-04-28 DIAGNOSIS — E03.9 ACQUIRED HYPOTHYROIDISM: ICD-10-CM

## 2019-04-28 RX ORDER — LEVOTHYROXINE SODIUM 137 UG/1
TABLET ORAL
Qty: 90 TAB | Refills: 2 | Status: SHIPPED | OUTPATIENT
Start: 2019-04-28 | End: 2019-12-12 | Stop reason: SDUPTHER

## 2019-05-29 DIAGNOSIS — E11.9 TYPE 2 DIABETES MELLITUS WITHOUT COMPLICATION, WITHOUT LONG-TERM CURRENT USE OF INSULIN (HCC): ICD-10-CM

## 2019-05-29 RX ORDER — METFORMIN HYDROCHLORIDE 500 MG/1
TABLET, EXTENDED RELEASE ORAL
Qty: 90 TAB | Refills: 1 | Status: SHIPPED | OUTPATIENT
Start: 2019-05-29 | End: 2019-06-06 | Stop reason: SDUPTHER

## 2019-06-06 DIAGNOSIS — E11.9 TYPE 2 DIABETES MELLITUS WITHOUT COMPLICATION, WITHOUT LONG-TERM CURRENT USE OF INSULIN (HCC): ICD-10-CM

## 2019-06-09 DIAGNOSIS — E11.9 TYPE 2 DIABETES MELLITUS WITHOUT COMPLICATION, WITHOUT LONG-TERM CURRENT USE OF INSULIN (HCC): ICD-10-CM

## 2019-06-10 RX ORDER — METFORMIN HYDROCHLORIDE 500 MG/1
TABLET, EXTENDED RELEASE ORAL
Qty: 90 TAB | Refills: 1 | Status: SHIPPED | OUTPATIENT
Start: 2019-06-10 | End: 2019-06-11 | Stop reason: SDUPTHER

## 2019-06-11 ENCOUNTER — TELEPHONE (OUTPATIENT)
Dept: INTERNAL MEDICINE CLINIC | Age: 64
End: 2019-06-11

## 2019-06-11 DIAGNOSIS — E11.9 TYPE 2 DIABETES MELLITUS WITHOUT COMPLICATION, WITHOUT LONG-TERM CURRENT USE OF INSULIN (HCC): ICD-10-CM

## 2019-06-11 RX ORDER — METFORMIN HYDROCHLORIDE 500 MG/1
TABLET, EXTENDED RELEASE ORAL
Qty: 180 TAB | Refills: 1 | Status: SHIPPED | OUTPATIENT
Start: 2019-06-11 | End: 2019-09-24 | Stop reason: SDUPTHER

## 2019-06-11 RX ORDER — METFORMIN HYDROCHLORIDE 500 MG/1
TABLET, EXTENDED RELEASE ORAL
Qty: 90 TAB | Refills: 1 | Status: CANCELLED | OUTPATIENT
Start: 2019-06-11

## 2019-06-11 RX ORDER — ROPINIROLE 4 MG/1
4 TABLET, FILM COATED ORAL DAILY
Qty: 90 TAB | Refills: 0 | OUTPATIENT
Start: 2019-06-11 | End: 2019-09-09

## 2019-06-11 NOTE — TELEPHONE ENCOUNTER
[facility] Pool             Pt requesting refill of \"Metformin\" 500 mg, 90day supply in order for the insurance to pay for it. Pt is completely out of medication.  [facility]. Best contact [phone].

## 2019-06-11 NOTE — TELEPHONE ENCOUNTER
Patient states he insurance company will only cover 90 day supply for her Metformin script  States we sent the medication in for 90 tabs not 90 day supply , requesting a new script

## 2019-06-17 RX ORDER — MELOXICAM 15 MG/1
TABLET ORAL
Qty: 90 TAB | Refills: 0 | Status: SHIPPED | OUTPATIENT
Start: 2019-06-17 | End: 2019-09-02 | Stop reason: SDUPTHER

## 2019-07-09 ENCOUNTER — OFFICE VISIT (OUTPATIENT)
Dept: INTERNAL MEDICINE CLINIC | Age: 64
End: 2019-07-09

## 2019-07-09 VITALS
DIASTOLIC BLOOD PRESSURE: 78 MMHG | HEART RATE: 114 BPM | RESPIRATION RATE: 18 BRPM | BODY MASS INDEX: 30.08 KG/M2 | HEIGHT: 64 IN | TEMPERATURE: 98.5 F | WEIGHT: 176.2 LBS | SYSTOLIC BLOOD PRESSURE: 138 MMHG | OXYGEN SATURATION: 95 %

## 2019-07-09 DIAGNOSIS — F43.10 PTSD (POST-TRAUMATIC STRESS DISORDER): ICD-10-CM

## 2019-07-09 DIAGNOSIS — E11.21 TYPE 2 DIABETES WITH NEPHROPATHY (HCC): Primary | ICD-10-CM

## 2019-07-09 DIAGNOSIS — I10 ESSENTIAL HYPERTENSION, BENIGN: ICD-10-CM

## 2019-07-09 DIAGNOSIS — M54.9 BACK PAIN, UNSPECIFIED BACK LOCATION, UNSPECIFIED BACK PAIN LATERALITY, UNSPECIFIED CHRONICITY: ICD-10-CM

## 2019-07-09 DIAGNOSIS — E03.9 ACQUIRED HYPOTHYROIDISM: ICD-10-CM

## 2019-07-09 DIAGNOSIS — E78.9 LIPID DISORDER: ICD-10-CM

## 2019-07-09 NOTE — PROGRESS NOTES
HISTORY OF PRESENT ILLNESS  Atul Musa is a 58 y.o. female. HPI  PTSD/Mood: Pt notes she is taking care of her . She notes that his parkinson's is getting worse. She notes that she does not think that she is depressed she is just sad. She notes she is seeing a therapist.     Hypertension ROS: taking medications as instructed, no medication side effects noted, no TIA's, no chest pain on exertion, no dyspnea on exertion, no swelling of ankles. New concerns: BP in office today is 154/76. Pt's BP was 130/80 in exam room. Diabetic Review of Systems - home glucose monitoring: is performed regularly, further diabetic ROS: no polyuria or polydipsia, no chest pain, dyspnea or TIA's, no numbness, tingling or pain in extremities. Other symptoms and concerns: Pt continues with Jardiance, Metformin, and Glipizide. She notes that the sensors keep coming out of her arm. She notes that she would like to switch to Trulicity. Avg sugars are 150. Pt notes that she is constantly moving. She notes that she does not formally exercise much. Back pain: Pt reports that since she is taking care of her  she is lifting and moving around constantly. She notes she has been taking Acetaminophen and icing her back because of some discomfort. She thinks the pain is from lifting. Hyperlipidemia:  Cardiovascular risk analysis - 58 y.o. female LDL goal is under 100. ROS: taking medications as instructed, no medication side effects noted, no TIA's, no chest pain on exertion, no dyspnea on exertion, no swelling of ankles. Tolerating meds, no myalgias or other side effects noted  New concerns: Pt's last LDL was 101 on 11/8/18. hypothyroidism. Lab Results   Component Value Date/Time    TSH 0.656 11/07/2018 08:52 AM     Thyroid ROS: denies fatigue, heat/cold intolerance, bowel/skin changes or CVS symptoms. Pt notes that her weight has been fluctuating more.  She notes that when she is stressed she is snacking more and that might be the cause of the weight gain. Review of Systems   Musculoskeletal: Positive for back pain. Psychiatric/Behavioral:        Stressed   All other systems reviewed and are negative. Physical Exam   Constitutional: She is oriented to person, place, and time. She appears well-developed and well-nourished. HENT:   Head: Normocephalic and atraumatic. Right Ear: External ear normal.   Left Ear: External ear normal.   Nose: Nose normal.   Mouth/Throat: Oropharynx is clear and moist.   Eyes: Conjunctivae and EOM are normal.   Neck: Normal range of motion. Neck supple. Carotid bruit is not present. No thyroid mass and no thyromegaly present. Cardiovascular: Normal rate, regular rhythm, normal heart sounds and intact distal pulses. Pulmonary/Chest: Effort normal and breath sounds normal.   Abdominal: Soft. Bowel sounds are normal.   Musculoskeletal: Normal range of motion. Neurological: She is alert and oriented to person, place, and time. She has normal strength. No cranial nerve deficit or sensory deficit. Coordination normal.   Skin: Skin is warm and dry. No abrasion and no rash noted. Psychiatric: She has a normal mood and affect. Her behavior is normal. Judgment and thought content normal.   Nursing note and vitals reviewed. ASSESSMENT and PLAN  Diagnoses and all orders for this visit:    1. Type 2 diabetes with nephropathy ([address]  Discussed with pt the if her sugars are stable that we should not switch her medication. Encouraged pt to continue to follow diabetic diet, minimize snacking, increase formal exercise and monitor sugars. Will continue to monitor for improvements or changes. -     MICROALBUMIN, UR, RAND W/ MICROALB/CREAT RATIO  -     HEMOGLOBIN A1C WITH EAG    2. Essential hypertension, benign  BP is at goal. I do not recommend any change in medications.   -     METABOLIC PANEL, COMPREHENSIVE    3. Acquired hypothyroidism  Thyroid stable.  I do not recommend a change in medications.  -     TSH 3RD GENERATION  -     T4, FREE    4. Lipid disorder  Stable, patient is tolerating pain medications, no myalgias. I do not recommend any change in medications.   -     LIPID PANEL    5. PTSD (post-traumatic stress disorder)  Stable and well-managed. Pt will continue to f/u with specialist.    6. Back pain, unspecified back location, unspecified back pain laterality, unspecified chronicity  Encouraged pt to continue with her pain relief regimen. Advised pt to monitor her back pain for any changes or improvements. Lab results and schedule of future lab studies reviewed with patient. Reviewed diet, exercise and weight control. Written by Patricia Felix, as dictated by Elvis Reed MD.     Current diagnosis and concerns discussed with pt at length. Understands risks and benefits or current treatment plan and medications and accepts the treatment and medication with any possible risks. Pt asks appropriate questions which were answered. Pt instructed to call with any concerns or problems. This note will not be viewable in [address]

## 2019-07-10 LAB
ALBUMIN SERPL-MCNC: 4.9 G/DL (ref 3.6–4.8)
ALBUMIN/CREAT UR: 86.5 MG/G CREAT (ref 0–30)
ALBUMIN/GLOB SERPL: 1.6 {RATIO} (ref 1.2–2.2)
ALP SERPL-CCNC: 107 IU/L (ref 39–117)
ALT SERPL-CCNC: 30 IU/L (ref 0–32)
AST SERPL-CCNC: 26 IU/L (ref 0–40)
BILIRUB SERPL-MCNC: 0.3 MG/DL (ref 0–1.2)
BUN SERPL-MCNC: 18 MG/DL (ref 8–27)
BUN/CREAT SERPL: 26 (ref 12–28)
CALCIUM SERPL-MCNC: 10.1 MG/DL (ref 8.7–10.3)
CHLORIDE SERPL-SCNC: 98 MMOL/L (ref 96–106)
CHOLEST SERPL-MCNC: 270 MG/DL (ref 100–199)
CO2 SERPL-SCNC: 22 MMOL/L (ref 20–29)
CREAT SERPL-MCNC: 0.7 MG/DL (ref 0.57–1)
CREAT UR-MCNC: 45.8 MG/DL
EST. AVERAGE GLUCOSE BLD GHB EST-MCNC: 177 MG/DL
GLOBULIN SER CALC-MCNC: 3.1 G/DL (ref 1.5–4.5)
GLUCOSE SERPL-MCNC: 150 MG/DL (ref 65–99)
HBA1C MFR BLD: 7.8 % (ref 4.8–5.6)
HDLC SERPL-MCNC: 53 MG/DL
INTERPRETATION, 910389: NORMAL
LDLC SERPL CALC-MCNC: 149 MG/DL (ref 0–99)
Lab: NORMAL
MICROALBUMIN UR-MCNC: 39.6 UG/ML
POTASSIUM SERPL-SCNC: 5.2 MMOL/L (ref 3.5–5.2)
PROT SERPL-MCNC: 8 G/DL (ref 6–8.5)
SODIUM SERPL-SCNC: 139 MMOL/L (ref 134–144)
T4 FREE SERPL-MCNC: 1.53 NG/DL (ref 0.82–1.77)
TRIGL SERPL-MCNC: 340 MG/DL (ref 0–149)
TSH SERPL DL<=0.005 MIU/L-ACNC: 0.74 UIU/ML (ref 0.45–4.5)
VLDLC SERPL CALC-MCNC: 68 MG/DL (ref 5–40)

## 2019-07-13 RX ORDER — SIMVASTATIN 10 MG/1
10 TABLET, FILM COATED ORAL
Qty: 90 TAB | Refills: 1 | Status: SHIPPED | OUTPATIENT
Start: 2019-07-13 | End: 2019-12-23 | Stop reason: SDUPTHER

## 2019-07-14 DIAGNOSIS — I10 ESSENTIAL HYPERTENSION, BENIGN: Chronic | ICD-10-CM

## 2019-07-14 RX ORDER — LOSARTAN POTASSIUM 100 MG/1
TABLET ORAL
Qty: 90 TAB | Refills: 1 | Status: SHIPPED | OUTPATIENT
Start: 2019-07-14 | End: 2020-01-22

## 2019-07-18 ENCOUNTER — TELEPHONE (OUTPATIENT)
Dept: INTERNAL MEDICINE CLINIC | Age: 64
End: 2019-07-18

## 2019-07-18 NOTE — TELEPHONE ENCOUNTER
----- Message from Mary Mckeon sent at 7/18/2019  3:03 PM EDT -----  Regarding: Dr. Jaskaran Anguiano Message/Vendor Calls    Caller's first and last name:      Reason for call:  Medication is too expensive    Callback required yes/no and why:  Yes needs a new medication    Best contact number(s):  [phone]    Details to clarify the request:  Pt states her medications are too expensive \"Jardiance\" $200 \"trulidity\" $298 so she is asking Dr. Claudia Robins to look into different kinds.     Pamela Nunez

## 2019-07-22 ENCOUNTER — OFFICE VISIT (OUTPATIENT)
Dept: INTERNAL MEDICINE CLINIC | Age: 64
End: 2019-07-22

## 2019-07-22 VITALS
SYSTOLIC BLOOD PRESSURE: 116 MMHG | RESPIRATION RATE: 16 BRPM | DIASTOLIC BLOOD PRESSURE: 73 MMHG | WEIGHT: 180 LBS | TEMPERATURE: 97.9 F | HEIGHT: 64 IN | HEART RATE: 91 BPM | BODY MASS INDEX: 30.73 KG/M2 | OXYGEN SATURATION: 93 %

## 2019-07-22 DIAGNOSIS — I10 ESSENTIAL HYPERTENSION, BENIGN: ICD-10-CM

## 2019-07-22 DIAGNOSIS — J01.00 ACUTE NON-RECURRENT MAXILLARY SINUSITIS: Primary | ICD-10-CM

## 2019-07-22 RX ORDER — FLUTICASONE PROPIONATE 50 MCG
2 SPRAY, SUSPENSION (ML) NASAL 2 TIMES DAILY
Qty: 1 BOTTLE | Refills: 1 | Status: SHIPPED | OUTPATIENT
Start: 2019-07-22 | End: 2019-08-13 | Stop reason: SDUPTHER

## 2019-07-22 RX ORDER — AMOXICILLIN AND CLAVULANATE POTASSIUM 875; 125 MG/1; MG/1
1 TABLET, FILM COATED ORAL EVERY 12 HOURS
Qty: 14 TAB | Refills: 0 | Status: SHIPPED | OUTPATIENT
Start: 2019-07-22 | End: 2019-10-22 | Stop reason: ALTCHOICE

## 2019-07-22 NOTE — PROGRESS NOTES
HISTORY OF PRESENT ILLNESS  Adonay Dimas is a 58 y.o. female. HPI  Seen with concerns for sinusitis. She notes about a week and a half of ear fullness, now facial and dental pain. No purulent drainage, no fever, chills or shortness of breath. Some dizziness. Fearful of falling. Blood pressure is well controlled. Review of Systems   Constitutional: Positive for malaise/fatigue. Negative for chills, diaphoresis and fever. HENT: Positive for congestion. Negative for ear discharge, ear pain, hearing loss, nosebleeds and sore throat. Eyes: Negative for pain and discharge. Respiratory: Negative for cough, hemoptysis, sputum production, shortness of breath and wheezing. Cardiovascular: Negative for chest pain. Neurological: Positive for dizziness. Negative for headaches. Physical Exam   Constitutional: She is oriented to person, place, and time. She appears well-developed and well-nourished. HENT:   Head: Normocephalic. Right Ear: Tympanic membrane, external ear and ear canal normal.   Left Ear: Tympanic membrane, external ear and ear canal normal.   Nose: Nose normal.   Mouth/Throat: Oropharynx is clear and moist and mucous membranes are normal. No oropharyngeal exudate. Eyes: Pupils are equal, round, and reactive to light. Conjunctivae are normal. Right eye exhibits no discharge. Left eye exhibits no discharge. Neck: Normal range of motion. Neck supple. Cardiovascular: Normal rate, regular rhythm and normal heart sounds. Pulmonary/Chest: Effort normal and breath sounds normal. No respiratory distress. She has no wheezes. She has no rales. Musculoskeletal: She exhibits no edema. Lymphadenopathy:     She has no cervical adenopathy. Neurological: She is alert and oriented to person, place, and time. No cranial nerve deficit. Nursing note and vitals reviewed. ASSESSMENT and PLAN  Diagnoses and all orders for this visit:    1.  Acute non-recurrent maxillary sinusitis  - amoxicillin-clavulanate (AUGMENTIN) 875-125 mg per tablet; Take 1 Tab by mouth every twelve (12) hours. -     fluticasone propionate (FLONASE) 50 mcg/actuation nasal spray; 2 Sprays by Both Nostrils route two (2) times a day. 2. Essential hypertension, benign    Controlled bp  Follow up if signs and symptoms worsen or change. After hours number given.

## 2019-08-04 RX ORDER — GLIPIZIDE 5 MG/1
TABLET, FILM COATED, EXTENDED RELEASE ORAL
Qty: 90 TAB | Refills: 1 | Status: SHIPPED | OUTPATIENT
Start: 2019-08-04 | End: 2019-11-22

## 2019-08-13 DIAGNOSIS — J01.00 ACUTE NON-RECURRENT MAXILLARY SINUSITIS: ICD-10-CM

## 2019-08-14 RX ORDER — FLUTICASONE PROPIONATE 50 MCG
2 SPRAY, SUSPENSION (ML) NASAL 2 TIMES DAILY
Qty: 16 G | Refills: 1 | Status: SHIPPED | OUTPATIENT
Start: 2019-08-14 | End: 2020-06-24

## 2019-08-26 RX ORDER — PANTOPRAZOLE SODIUM 40 MG/1
TABLET, DELAYED RELEASE ORAL
Qty: 90 TAB | Refills: 1 | Status: SHIPPED | OUTPATIENT
Start: 2019-08-26 | End: 2019-10-22 | Stop reason: SDUPTHER

## 2019-09-02 DIAGNOSIS — E11.9 TYPE 2 DIABETES MELLITUS WITHOUT COMPLICATION, WITHOUT LONG-TERM CURRENT USE OF INSULIN (HCC): ICD-10-CM

## 2019-09-02 RX ORDER — DULAGLUTIDE 0.75 MG/.5ML
INJECTION, SOLUTION SUBCUTANEOUS
Qty: 2 SYRINGE | Refills: 1 | Status: SHIPPED | OUTPATIENT
Start: 2019-09-02 | End: 2019-10-28 | Stop reason: SDUPTHER

## 2019-09-24 DIAGNOSIS — E11.9 TYPE 2 DIABETES MELLITUS WITHOUT COMPLICATION, WITHOUT LONG-TERM CURRENT USE OF INSULIN (HCC): ICD-10-CM

## 2019-09-24 RX ORDER — METFORMIN HYDROCHLORIDE 500 MG/1
TABLET, EXTENDED RELEASE ORAL
Qty: 270 TAB | Refills: 1 | Status: SHIPPED | OUTPATIENT
Start: 2019-09-24 | End: 2020-03-18

## 2019-10-15 ENCOUNTER — PATIENT MESSAGE (OUTPATIENT)
Dept: INTERNAL MEDICINE CLINIC | Age: 64
End: 2019-10-15

## 2019-10-15 DIAGNOSIS — R92.8 ABNORMAL MAMMOGRAM OF RIGHT BREAST: Primary | ICD-10-CM

## 2019-10-15 NOTE — TELEPHONE ENCOUNTER
From: Dakota Vazquez  To:  Mari Cortes MD  Sent: 10/15/2019 10:04 AM EDT  Subject: Referral Request    I need a referral for a diagnostic mammo at [facility].

## 2019-10-22 ENCOUNTER — OFFICE VISIT (OUTPATIENT)
Dept: INTERNAL MEDICINE CLINIC | Age: 64
End: 2019-10-22

## 2019-10-22 VITALS
DIASTOLIC BLOOD PRESSURE: 88 MMHG | TEMPERATURE: 99.4 F | RESPIRATION RATE: 16 BRPM | HEIGHT: 64 IN | HEART RATE: 95 BPM | BODY MASS INDEX: 29.88 KG/M2 | WEIGHT: 175 LBS | SYSTOLIC BLOOD PRESSURE: 138 MMHG | OXYGEN SATURATION: 95 %

## 2019-10-22 DIAGNOSIS — F41.9 ANXIETY: ICD-10-CM

## 2019-10-22 DIAGNOSIS — N95.1 SWEATS, MENOPAUSAL: ICD-10-CM

## 2019-10-22 DIAGNOSIS — M54.12 RIGHT CERVICAL RADICULOPATHY: Primary | ICD-10-CM

## 2019-10-22 DIAGNOSIS — J34.89 SINUS DRAINAGE: ICD-10-CM

## 2019-10-22 DIAGNOSIS — L30.9 DERMATITIS: ICD-10-CM

## 2019-10-22 DIAGNOSIS — R19.7 DIARRHEA, UNSPECIFIED TYPE: ICD-10-CM

## 2019-10-22 DIAGNOSIS — K21.00 GERD WITH ESOPHAGITIS: ICD-10-CM

## 2019-10-22 RX ORDER — QUETIAPINE FUMARATE 100 MG/1
1 TABLET, FILM COATED ORAL 2 TIMES DAILY
Refills: 0 | COMMUNITY
Start: 2019-09-17 | End: 2020-12-04

## 2019-10-22 RX ORDER — TRIAMCINOLONE ACETONIDE 1 MG/G
CREAM TOPICAL 2 TIMES DAILY
Qty: 15 G | Refills: 0 | Status: SHIPPED | OUTPATIENT
Start: 2019-10-22 | End: 2020-04-01

## 2019-10-22 RX ORDER — PANTOPRAZOLE SODIUM 40 MG/1
TABLET, DELAYED RELEASE ORAL
Qty: 90 TAB | Refills: 1 | Status: SHIPPED | OUTPATIENT
Start: 2019-10-22 | End: 2019-12-23 | Stop reason: SDUPTHER

## 2019-10-22 RX ORDER — AZELASTINE 1 MG/ML
1 SPRAY, METERED NASAL 2 TIMES DAILY
Qty: 1 BOTTLE | Refills: 5 | Status: SHIPPED | OUTPATIENT
Start: 2019-10-22 | End: 2019-11-22

## 2019-10-22 RX ORDER — CYCLOBENZAPRINE HCL 10 MG
10 TABLET ORAL
Qty: 30 TAB | Refills: 0 | Status: SHIPPED | OUTPATIENT
Start: 2019-10-22 | End: 2020-04-27 | Stop reason: SDUPTHER

## 2019-10-22 RX ORDER — CLONAZEPAM 1 MG/1
1 TABLET ORAL 3 TIMES DAILY
COMMUNITY
Start: 2019-10-22 | End: 2020-12-18 | Stop reason: SDUPTHER

## 2019-10-22 NOTE — PROGRESS NOTES
HISTORY OF PRESENT ILLNESS  Brandee Mcgrath is a 71 y.o. female. HPI  She has a history of diabetes, hypertension and anxiety, comes in with several issues:  1. She has been sweating. Notes it started a few weeks ago, but has gotten worse. She ran out of her Klonopin roughly four to five days ago and realized she may be having some withdrawal, feels tremulous and anxious and is refilling it today. 2. Pain behind her right shoulder blade with some radiation, bothers her particularly at night. No pleuritic pain, no weakness in hands. Not sleeping well. We are going to add a muscle relaxer at night. 3. Rash, which just developed along her neck line this morning. Somewhat itchy. No new skincare products. 4. Diabetes, not sure of control. She misplaced her Freestyle [place] monitor, she has not had labs recently. She is encouraged to see Moreno Morgan for assistance. 5. Hypertension. Remains on Losartan. Pressure is up. Again, some of this may be withdrawal from the Clonidine. Review of Systems   Constitutional: Positive for diaphoresis. Negative for chills, fever and weight loss. Respiratory: Negative for cough, shortness of breath and wheezing. Cardiovascular: Negative for chest pain, palpitations, orthopnea, leg swelling and PND. Gastrointestinal: Positive for diarrhea. Negative for abdominal pain, blood in stool, heartburn, nausea and vomiting. Musculoskeletal: Positive for myalgias. Neurological: Positive for tremors. Negative for dizziness, tingling, sensory change, speech change, focal weakness and headaches. Psychiatric/Behavioral: Negative for depression. The patient is nervous/anxious. Physical Exam   Constitutional: She is oriented to person, place, and time. She appears well-developed and well-nourished. HENT:   Head: Normocephalic and atraumatic.    Right Ear: Tympanic membrane, external ear and ear canal normal.   Left Ear: Tympanic membrane, external ear and ear canal normal.   Nose: Nose normal.   Mouth/Throat: Oropharynx is clear and moist and mucous membranes are normal. No oropharyngeal exudate. Eyes: Pupils are equal, round, and reactive to light. Conjunctivae are normal. Right eye exhibits no discharge. Left eye exhibits no discharge. Neck: Normal range of motion. Neck supple. Carotid bruit is not present. No thyromegaly present. Cardiovascular: Normal rate, regular rhythm, S1 normal, S2 normal, normal heart sounds and intact distal pulses. No murmur heard. Pulmonary/Chest: Effort normal and breath sounds normal. No respiratory distress. She has no wheezes. She has no rales. Abdominal: Soft. Bowel sounds are normal. She exhibits no distension and no mass. There is no tenderness. There is no rebound and no guarding. Musculoskeletal: She exhibits tenderness (behind rigth levator scapula muscle  intact bilat and rom shoulders good). She exhibits no edema. Lymphadenopathy:     She has no cervical adenopathy. Neurological: She is alert and oriented to person, place, and time. Seems a little tremoulous   Skin: Rash noted. Scattered red papules at neck line no blisters   Psychiatric: She has a normal mood and affect. Her behavior is normal.   Nursing note and vitals reviewed. ASSESSMENT and PLAN  Diagnoses and all orders for this visit:    1. Right cervical radiculopathy  -     cyclobenzaprine (FLEXERIL) 10 mg tablet; Take 1 Tab by mouth nightly. 2. Diarrhea, unspecified type-now better    3. Sweats, menopausal-may also be from diabetes and withdrawal from benzo-resume klonopin and rto for help with diabetes    4. Dermatitis  -     triamcinolone acetonide (KENALOG) 0.1 % topical cream; Apply  to affected area two (2) times a day. use thin layer    5. Sinus drainage  -     azelastine (ASTELIN) 137 mcg (0.1 %) nasal spray; 1 [place] by Both Nostrils route two (2) times a day. Use in each nostril as directed    6.  GERD with esophagitis  - pantoprazole (PROTONIX) 40 mg tablet; TAKE 1 TABLET BY MOUTH EVERY DAY    7.  Anxiety      the following changes in treatment are made: resume klonopin as I do worry re withdrawal  See Laxmi Orr for help with diabetes control

## 2019-10-23 ENCOUNTER — OFFICE VISIT (OUTPATIENT)
Dept: INTERNAL MEDICINE CLINIC | Age: 64
End: 2019-10-23

## 2019-10-23 DIAGNOSIS — E11.9 TYPE 2 DIABETES MELLITUS WITHOUT COMPLICATION, WITHOUT LONG-TERM CURRENT USE OF INSULIN (HCC): Primary | ICD-10-CM

## 2019-10-23 NOTE — PROGRESS NOTES
CC:  Diabetes management/education    S/O: Radha Bowie is a 71 y.o. female referred by Dr. Bartolome Raymond MD for diabetes management. HPI: Patient presents to clinic for chronic disease state management. Patient has gotten into the habit of skipping meals because she has had a decrease in appetite (for the past month) and engaging in other activities during the day such as taking her  to and from the [place]. Patient endorses symptoms of low blood sugar (shakiness, anxious, and sweating) typically in the early afternoon/ late evening. Patient states she has lost her freestyle jamie meter 5 months ago and does not know how to use her other self-monitoring blood glucose meter. She says she has difficulty drawing blood from her finger and has not monitored her sugars in a while. Patient states she loves her Trulicity and has lost 7lbs since starting it. Current Diabetes Regimen:   Metformin 500mg ER: 2 tabs AM and 1 tab PM   Jardiance 05EV daily    Trulicity 8.05HM weekly   Glipizide XR 5mg daily     ROS:   Patient endorses hypoglycemic episodes and denies  hyperglycemic signs/symptoms, chest pain, shortness of breath, neuropathy, vision changes, and any foot changes. Self-Monitoring Blood Glucose:  Has not monitored sugars at home for a while. Diet:  Breakfast: eggs and toast  Lunch: peanut butter sandwich  Dinner: typically eats around 6 but varies    Exercise: not discussed at this visit. Data reviewed:  Lab Results   Component Value Date/Time    Hemoglobin A1c 7.8 (H) 07/09/2019 02:07 PM         Diabetes Health Maintenance:  Last eye exam: unknown  Last foot exam: 11/7/2018  Last influenza vaccine: 8/27/2019  Last Pneumovax 23 vaccine: 5/10/2018  Last Prevnar-13 vaccine: unknown  Hepatitis B Series: unknown  ASA Therapy: ASA 81 mg daily   ACE/ARB Therapy: Losartan 100 mg daily   Statin Therapy: Simvastatin 10 mg nightly    Assessment/Plan:     1.   Diabetes:  Patient above goal A1C (<7%) at 7.8% on 7/9/19. In hopes to alleviate these hypoglycemic episodes, patient was instructed to stop glipizide due to decrease in appetite and symptoms of low blood sugar in the early afternoons. Educated patient that decreased appetite is an effect of Trulicity-- also patient is on Ritalin which can cause appetite suppression as well. Patient is to continue metformin, Trulicity, and Jardiance as prescribed. Patient is to come back to clinic tomorrow to be taught how to use her home self monitoring blood glucose meter. Once we get the low blood sugars under control and patient is monitoring her BG at home, the plan is to reassess SMBG values and potentially increase Trulicity to 6.8QP once weekly. Patient verbalized understanding of the information presented and all of the patients questions were answered. AVS was handed to the patient and information reviewed. Patient advised to call me or Dr. Sumeet Argueta MD with any additional questions or concerns. Follow-up: 10/24/19 at 2:00pm.    Notification of recommendations will be sent to Dr. Sumeet Argueta MD for review.     Thanks,  Blossom Sullivan

## 2019-10-23 NOTE — PROGRESS NOTES
Patient seen with pharmacy resident Christin Adam, PharmD. I was present for the entire visit and agree with her assessment and plan. Please see her note for a more detailed description of the visit.     Clau Stephenson PharmD, BCPS, CDE

## 2019-10-24 ENCOUNTER — OFFICE VISIT (OUTPATIENT)
Dept: INTERNAL MEDICINE CLINIC | Age: 64
End: 2019-10-24

## 2019-10-24 DIAGNOSIS — E11.21 TYPE 2 DIABETES WITH NEPHROPATHY (HCC): Primary | ICD-10-CM

## 2019-10-24 NOTE — PROGRESS NOTES
Patient presents to clinic for teaching of new glucometer. Pharmacist programmed initial date and time of glucometer. Pharmacist educated patient about each individual components of SMBG process (the test strip, the lancet, and the meter). Patient performed first SMBG check during visit and value resulted at 148mg/dL. Patient has eaten today; this value is within goal of PPBG < 180mg/dL. Patient asked what values does she need to worry about? Pharmacist provided education on meaning of A1C and goal of BG values (fasting and post-prandial). Patient endorsed understanding of A1C, goal BG values, and how to use glucometer. Return to clinic: Pete Mckinnon to touch base after next appointment with Dr. Paulina Doe    Thank you,  Elver Almaraz, PharmD.

## 2019-10-28 RX ORDER — DULAGLUTIDE 0.75 MG/.5ML
INJECTION, SOLUTION SUBCUTANEOUS
Qty: 2 SYRINGE | Refills: 1 | Status: SHIPPED | OUTPATIENT
Start: 2019-10-28 | End: 2019-12-16 | Stop reason: SDUPTHER

## 2019-11-01 NOTE — PROGRESS NOTES
Patient seen with pharmacy resident Miah Dowell, S[place]. I was present for the entire visit and agree with her assessment and plan. Please see her note for a more detailed description of the visit.     Temo Simmons PharmD, BCPS, CDE

## 2019-11-22 ENCOUNTER — OFFICE VISIT (OUTPATIENT)
Dept: INTERNAL MEDICINE CLINIC | Age: 64
End: 2019-11-22

## 2019-11-22 VITALS
WEIGHT: 175 LBS | RESPIRATION RATE: 20 BRPM | OXYGEN SATURATION: 94 % | TEMPERATURE: 98 F | HEART RATE: 106 BPM | HEIGHT: 64 IN | BODY MASS INDEX: 29.88 KG/M2 | SYSTOLIC BLOOD PRESSURE: 138 MMHG | DIASTOLIC BLOOD PRESSURE: 86 MMHG

## 2019-11-22 DIAGNOSIS — Z23 ENCOUNTER FOR IMMUNIZATION: ICD-10-CM

## 2019-11-22 DIAGNOSIS — F33.1 MODERATE EPISODE OF RECURRENT MAJOR DEPRESSIVE DISORDER (HCC): ICD-10-CM

## 2019-11-22 DIAGNOSIS — Z00.00 MEDICARE ANNUAL WELLNESS VISIT, SUBSEQUENT: Primary | ICD-10-CM

## 2019-11-22 DIAGNOSIS — I10 ESSENTIAL HYPERTENSION, BENIGN: ICD-10-CM

## 2019-11-22 DIAGNOSIS — E11.21 TYPE 2 DIABETES WITH NEPHROPATHY (HCC): ICD-10-CM

## 2019-11-22 DIAGNOSIS — E78.9 LIPID DISORDER: ICD-10-CM

## 2019-11-22 DIAGNOSIS — E03.9 ACQUIRED HYPOTHYROIDISM: ICD-10-CM

## 2019-11-22 NOTE — PROGRESS NOTES
HISTORY OF PRESENT ILLNESS  Zaynab Ramos is a 71 y.o. female. HPI  DM: Stable with Trulicity, Jardiance, Metformin. She has seen Dr. Farooq Ayon and has lost weight. She is focused and determined to care for herself and improve her health. She has had her routine eye exam.   Diabetic Review of Systems - home glucose monitoring: is performed regularly, further diabetic ROS: no polyuria or polydipsia, no chest pain, dyspnea or TIA's, no numbness, tingling or pain in extremities. She states that she is in the process of getting a new glucose monitor since hers is not working properly anymore. Depression: She reports her mood is much improved. She continues to take Seroquel. Hypertension: Stable with Losartan. ROS: taking medications as instructed, no medication side effects noted, no TIA's, no chest pain on exertion, no dyspnea on exertion, no swelling of ankles. New concerns: BP in office today is 138/86. Hypothyroidism: She is compliant with her Synthroid. Lab Results   Component Value Date/Time    TSH 0.744 07/09/2019 02:07 PM     Thyroid ROS: denies fatigue, weight changes, heat/cold intolerance, bowel/skin changes or CVS symptoms. Lipid Disorder: Stable with simvastatin. Cardiovascular risk analysis - 71 y.o. female LDL goal is under 100. ROS: taking medications as instructed, no medication side effects noted, no TIA's, no chest pain on exertion, no dyspnea on exertion, no swelling of ankles. Tolerating meds, no myalgias or other side effects noted  New concerns: none doing well    Neck: Pain has improved. She has seen ENT for ear clogging and had her ears cleaned out. She is using Debrox in her ears regularly. She received her second shingles vaccine. She has not gotten her second pneumonia vaccine. Review of Systems   All other systems reviewed and are negative. Physical Exam  Vitals signs and nursing note reviewed.    Constitutional:       Appearance: She is well-developed and normal weight. HENT:      Head: Normocephalic and atraumatic. Right Ear: External ear normal.      Left Ear: External ear normal.      Nose: Nose normal.      Mouth/Throat:      Pharynx: Oropharynx is clear. Eyes:      Extraocular Movements: Extraocular movements intact. Conjunctiva/sclera: Conjunctivae normal.      Pupils: Pupils are equal, round, and reactive to light. Neck:      Musculoskeletal: Normal range of motion and neck supple. Thyroid: No thyroid mass or thyromegaly. Vascular: No carotid bruit. Cardiovascular:      Rate and Rhythm: Normal rate and regular rhythm. Pulses: Normal pulses. Pulmonary:      Effort: Pulmonary effort is normal.      Breath sounds: Normal breath sounds. Abdominal:      General: Bowel sounds are normal.      Palpations: Abdomen is soft. Tenderness: There is no tenderness. Musculoskeletal: Normal range of motion. General: No tenderness. Skin:     General: Skin is warm and dry. Findings: No abrasion or rash. Neurological:      Mental Status: She is alert and oriented to person, place, and time. Cranial Nerves: No cranial nerve deficit. Sensory: No sensory deficit. Motor: No weakness. Coordination: Coordination is intact. Psychiatric:         Mood and Affect: Mood and affect normal.         Behavior: Behavior normal.         Thought Content: Thought content normal.         Judgment: Judgment normal.         ASSESSMENT and PLAN  Diagnoses and all orders for this visit:    1. Medicare annual wellness visit, subsequent    2. Type 2 diabetes with nephropathy (HCC)  Sugars stable. Continue to follow diabetic diet and monitor sugars.   -     MICROALBUMIN, UR, RAND W/ MICROALB/CREAT RATIO; Future  -     HEMOGLOBIN A1C WITH EAG; Future    3. Moderate episode of recurrent major depressive disorder (HCC)  Stable with Seroquel.      4. Essential hypertension, benign  BP is at goal. I do not recommend any change in medications.   -     METABOLIC PANEL, COMPREHENSIVE; Future    5. Acquired hypothyroidism  Thyroid stable. I do not recommend a change in medications.  -     TSH 3RD GENERATION; Future  -     T4, FREE; Future    6. Lipid disorder  Stable, patient is tolerating pain medications, no myalgias. I do not recommend any change in medications.   -     LIPID PANEL; Future    7. Encounter for immunization  Administered pneumonia injection today in office.   -     PNEUMOCOCCAL POLYSACCHARIDE VACCINE, 23-VALENT, ADULT OR IMMUNOSUPPRESSED PT DOSE,      lab results and schedule of future lab studies reviewed with patient  reviewed diet, exercise and weight control        Written by Shearon Ganser, Scribe, as dictated by Jun Monroy MD.      Current diagnosis and concerns discussed with pt at length. Understands risks and benefits or current treatment plan and medications and accepts the treatment and medication with any possible risks. Pt asks appropriate questions which were answered. Pt instructed to call with any concerns or problems. This note will not be viewable in [address]

## 2019-11-22 NOTE — PROGRESS NOTES
This is the Subsequent Medicare Annual Wellness Exam, performed 12 months or more after the Initial AWV or the last Subsequent AWV    I have reviewed the patient's medical history in detail and updated the computerized patient record. History     Patient Active Problem List   Diagnosis Code    Essential hypertension, benign I10    Hypothyroidism E03.9    ADD (attention deficit disorder) F98.8    Breast cancer ([facility] [address] C50.919    GERD (gastroesophageal reflux disease) K21.9    Postmenopausal Z78.0    PTSD (post-traumatic stress disorder) F43.10    Type 2 diabetes mellitus without complication ([facility] [address] W82.5    Type 2 diabetes with nephropathy ([facility] [address] E11.21     Past Medical History:   Diagnosis Date    ADD (attention deficit disorder) 8/17/2009    Breast cancer ([facility] [address] 2012    Left Breast    Depressive disorder, not elsewhere classified 8/17/2009    Essential hypertension, benign 8/17/2009    Hypertension     Pap smear for cervical cancer screening 07/28/2016    negative, HPV negative    Psychiatric disorder 2007    panic attacks/PTSD    PTSD (post-traumatic stress disorder)     Thyroid disease     Unspecified hypothyroidism 8/17/2009      Past Surgical History:   Procedure Laterality Date    BREAST SURGERY PROCEDURE UNLISTED  January 2012    left breast biopsy    HX BREAST LUMPECTOMY Left 2012    HX GYN      c section    HX HEENT  1992    partial thyroidectomy    HX TONSILLECTOMY      HX UROLOGICAL  1992    Bladder suspension     Current Outpatient Medications   Medication Sig Dispense Refill    TRULICITY 1.65 XV/6.8 mL sub-q pen 0.5 ML BY SUBCUTANEOUS ROUTE EVERY SEVEN (7) DAYS. 2 Syringe 1    QUEtiapine (SEROQUEL) 100 mg tablet Take 1 Tab by mouth three (3) times daily. 0    clonazePAM (KLONOPIN) 1 mg tablet Take 1 Tab by mouth three (3) times daily.  triamcinolone acetonide (KENALOG) 0.1 % topical cream Apply  to affected area two (2) times a day.  use thin layer 15 g 0    pantoprazole (PROTONIX) 40 mg tablet TAKE 1 TABLET BY MOUTH EVERY DAY 90 Tab 1    cyclobenzaprine (FLEXERIL) 10 mg tablet Take 1 Tab by mouth nightly. 30 Tab 0    flash glucose sensor (FREESTYLE MICHAEL 14 DAY SENSOR) kit 1 Units by Other route Once every 2 weeks. APPLY 1 SENSOR EVERY 14 DAYS TO CHECK BLOOD SUGARS 2 Kit 3    metFORMIN ER (GLUCOPHAGE XR) 500 mg tablet TAKE TWO TABLETS BY MOUTH EVERY MORNING AND 1 TABLET AT NIGHT 270 Tab 1    empagliflozin (JARDIANCE) 25 mg tablet TAKE 1 TABLET BY MOUTH EVERY DAY 90 Tab 0    meloxicam (MOBIC) 15 mg tablet TAKE 1 TABLET BY MOUTH EVERY DAY 90 Tab 1    fluticasone propionate (FLONASE) 50 mcg/actuation nasal spray 2 SPRAYS BY BOTH NOSTRILS ROUTE TWO (2) TIMES A DAY. (Patient taking differently: 2 Sprays by Both Nostrils route two (2) times daily as needed.) 16 g 1    rOPINIRole (REQUIP) 4 mg tab TAB TAKE 1 TABLET BY MOUTH EVERY DAY 90 Tab 4    losartan (COZAAR) 100 mg tablet TAKE 1 TABLET BY MOUTH EVERY DAY 90 Tab 1    simvastatin (ZOCOR) 10 mg tablet Take 1 Tab by mouth nightly. 90 Tab 1    levothyroxine (SYNTHROID) 137 mcg tablet TAKE 1 TABLET BY MOUTH DAILY 90 Tab 2    flash glucose scanning reader (FREESTYLE MICHAEL 14 DAY READER) misc Use to check blood sugars throughout the day 1 Each 0    gabapentin (NEURONTIN) 300 mg capsule TAKE ONE CAPSULE BY MOUTH 3 TIMES A DAY  1    Lancets misc Use to test blood sugars once daily 100 Each 3    aspirin delayed-release 81 mg tablet Take 81 mg by mouth daily.  citalopram (CELEXA) 40 mg tablet TAKE 1 TABLET BY MOUTH EVERY DAY 90 Tab 0    traZODone (DESYREL) 100 mg tablet TAKE 3 TABLETS BY MOUTH AT BEDTIME 270 Tab 1    methylphenidate (RITALIN) 20 mg tablet Take 2 Tabs by mouth two (2) times a day. 120 Tab 0    ONETOUCH ULTRA BLUE TEST STRIP strip TEST SUGARS ONCE DAILY AS DIRECTED 50 Strip 0    DAILY MULTI-VITAMIN PO Take 1 Tab by mouth daily.        No Known Allergies    Family History   Problem Relation Age of Onset    Diabetes Mother     Cancer Mother         breast    Stroke Mother     Breast Cancer Mother 57        80 second time    Hypertension Father     Elevated Lipids Father     Migraines Father     Dementia Father      Social History     Tobacco Use    Smoking status: Never Smoker    Smokeless tobacco: Never Used   Substance Use Topics    Alcohol use: No     Alcohol/week: 0.0 standard drinks       Depression Risk Factor Screening:     3 most recent PHQ Screens 7/22/2019   Little interest or pleasure in doing things Not at all   Feeling down, depressed, irritable, or hopeless Not at all   Total Score PHQ 2 0       Alcohol Risk Factor Screening:   Do you average 1 drink per night or more than 7 drinks a week:  No    On any one occasion in the past three months have you have had more than 3 drinks containing alcohol:  No      Functional Ability and Level of Safety:   Hearing: Hearing is good. Activities of Daily Living: The home contains: no safety equipment. Patient does total self care    Ambulation: with no difficulty    Fall Risk:  No flowsheet data found. Abuse Screen:  Patient is not abused    Cognitive Screening   Has your family/caregiver stated any concerns about your memory: no  Cognitive Screening: Normal - MMSE (Mini Mental Status Exam)    Patient Care Team   Patient Care Team:  Mairlee Hebert MD as PCP - Larissa Perez MD as PCP - [facility] Provider  Crow Pinedo MD as Physician (Obstetrics & Gynecology)  Shivam Dominguez MD (Breast Surgery)  Kris Dumont as Pharmacist    Assessment/Plan   Education and counseling provided:  Are appropriate based on today's review and evaluation  End-of-Life planning (with patient's consent)    Diagnoses and all orders for this visit:    1. Medicare annual wellness visit, subsequent    2. Type 2 diabetes with nephropathy ([facility] [address]    3. Moderate episode of recurrent major depressive disorder ([facility] [address]    4. Essential hypertension, benign    5. Acquired hypothyroidism    6. Lipid disorder    7. Encounter for immunization  -     PNEUMOCOCCAL POLYSACCHARIDE VACCINE, 23-VALENT, ADULT OR IMMUNOSUPPRESSED PT DOSE,        Health Maintenance Due   Topic Date Due    MEDICARE YEARLY EXAM  04/17/2019    GLAUCOMA SCREENING Q2Y  10/05/2019    Bone Densitometry (Dexa) Screening  10/05/2019     This note will not be viewable in MyChart.

## 2019-11-22 NOTE — PATIENT INSTRUCTIONS
Medicare Wellness Visit, Female The best way to live healthy is to have a lifestyle where you eat a well-balanced diet, exercise regularly, limit alcohol use, and quit all forms of tobacco/nicotine, if applicable. Regular preventive services are another way to keep healthy. Preventive services (vaccines, screening tests, monitoring & exams) can help personalize your care plan, which helps you manage your own care. Screening tests can find health problems at the earliest stages, when they are easiest to treat. [facility] follows the current, evidence-based guidelines published by the [facility] ([facility]) when recommending preventive services for our patients. Because we follow these guidelines, sometimes recommendations change over time as research supports it. (For example, mammograms used to be recommended annually. Even though Medicare will still pay for an annual mammogram, the newer guidelines recommend a mammogram every two years for women of average risk). Of course, you and your doctor may decide to screen more often for some diseases, based on your risk and your co-morbidities (chronic disease you are already diagnosed with). Preventive services for you include: - Medicare offers their members a free annual wellness visit, which is time for you and your primary care provider to discuss and plan for your preventive service needs. Take advantage of this benefit every year! 
-All adults over the age of 72 should receive the recommended pneumonia vaccines. Current USPSTF guidelines recommend a series of two vaccines for the best pneumonia protection.  
-All adults should have a flu vaccine yearly and a tetanus vaccine every 10 years.  
-All adults age 48 and older should receive the shingles vaccines (series of two vaccines). -All adults age 38-68 who are overweight should have a diabetes screening test once every three years. -All adults born between [birth year] and 1966 should be screened once for Hepatitis C. 
-Other screening tests and preventive services for persons with diabetes include: an eye exam to screen for diabetic retinopathy, a kidney function test, a foot exam, and stricter control over your cholesterol.  
-Cardiovascular screening for adults with routine risk involves an electrocardiogram (ECG) at intervals determined by your doctor.  
-Colorectal cancer screenings should be done for adults age 54-65 with no increased risk factors for colorectal cancer. There are a number of acceptable methods of screening for this type of cancer. Each test has its own benefits and drawbacks. Discuss with your doctor what is most appropriate for you during your annual wellness visit. The different tests include: colonoscopy (considered the best screening method), a fecal occult blood test, a fecal DNA test, and sigmoidoscopy. 
 
-A bone mass density test is recommended when a woman turns 65 to screen for osteoporosis. This test is only recommended one time, as a screening. Some providers will use this same test as a disease monitoring tool if you already have osteoporosis. -Breast cancer screenings are recommended every other year for women of normal risk, age 54-69. 
-Cervical cancer screenings for women over age 72 are only recommended with certain risk factors. Here is a list of your current Health Maintenance items (your personalized list of preventive services) with a due date: 
Health Maintenance Due Topic Date Due  
 Annual Well Visit  04/17/2019  Glaucoma Screening   10/05/2019  Bone Mineral Density   10/05/2019

## 2019-11-26 ENCOUNTER — TELEPHONE (OUTPATIENT)
Dept: INTERNAL MEDICINE CLINIC | Age: 64
End: 2019-11-26

## 2019-11-26 RX ORDER — LANCETS
EACH MISCELLANEOUS
Qty: 100 EACH | Refills: 3 | Status: SHIPPED | OUTPATIENT
Start: 2019-11-26 | End: 2021-10-04

## 2019-11-28 DIAGNOSIS — E11.9 TYPE 2 DIABETES MELLITUS WITHOUT COMPLICATION, WITHOUT LONG-TERM CURRENT USE OF INSULIN (HCC): ICD-10-CM

## 2019-12-12 DIAGNOSIS — E03.9 ACQUIRED HYPOTHYROIDISM: ICD-10-CM

## 2019-12-12 RX ORDER — LEVOTHYROXINE SODIUM 137 UG/1
TABLET ORAL
Qty: 90 TAB | Refills: 2 | Status: SHIPPED | OUTPATIENT
Start: 2019-12-12 | End: 2020-09-06

## 2019-12-20 ENCOUNTER — TELEPHONE (OUTPATIENT)
Dept: INTERNAL MEDICINE CLINIC | Age: 64
End: 2019-12-20

## 2019-12-20 NOTE — TELEPHONE ENCOUNTER
DR Barriga/telephone   Received: Yesterday   Message Contents   Parminder Howard sent to [employer]         General Message/Vendor Calls     Caller's first and last name: Naa Salazar from Dr Faustina Lock office  [facility])       Reason for call: calling to confirm if their lab requisitions were received that was sent over yesterday on  12/18/19 for pt       Callback required yes/no and why:yes for reason given above       Best contact number(s): [phone]       Details to clarify the request:       Byron Mckeon

## 2019-12-23 DIAGNOSIS — K21.00 GERD WITH ESOPHAGITIS: ICD-10-CM

## 2019-12-23 DIAGNOSIS — E11.9 TYPE 2 DIABETES MELLITUS WITHOUT COMPLICATION, WITHOUT LONG-TERM CURRENT USE OF INSULIN (HCC): ICD-10-CM

## 2019-12-23 RX ORDER — GLIPIZIDE 5 MG/1
TABLET, FILM COATED, EXTENDED RELEASE ORAL
Qty: 90 TAB | Refills: 1 | Status: SHIPPED | OUTPATIENT
Start: 2019-12-23 | End: 2020-03-07 | Stop reason: SDUPTHER

## 2019-12-24 RX ORDER — MELOXICAM 15 MG/1
15 TABLET ORAL DAILY
Qty: 90 TAB | Refills: 0 | Status: SHIPPED | OUTPATIENT
Start: 2019-12-24 | End: 2020-03-07 | Stop reason: SDUPTHER

## 2019-12-24 RX ORDER — SIMVASTATIN 10 MG/1
10 TABLET, FILM COATED ORAL
Qty: 90 TAB | Refills: 0 | Status: SHIPPED | OUTPATIENT
Start: 2019-12-24 | End: 2020-03-07 | Stop reason: SDUPTHER

## 2019-12-24 RX ORDER — PANTOPRAZOLE SODIUM 40 MG/1
TABLET, DELAYED RELEASE ORAL
Qty: 90 TAB | Refills: 1 | Status: SHIPPED | OUTPATIENT
Start: 2019-12-24 | End: 2020-09-30

## 2019-12-24 RX ORDER — ROPINIROLE 4 MG/1
4 TABLET, FILM COATED ORAL DAILY
Qty: 90 TAB | Refills: 0 | Status: SHIPPED | OUTPATIENT
Start: 2019-12-24 | End: 2020-10-15

## 2020-01-03 NOTE — TELEPHONE ENCOUNTER
[facility]/PHARMACY #[id number]- [place], [place] - [facility] [address]    Patient called to report that she has forgotten her Trulicity medication while traveling to be with dying mother. Patient reports that she just had medication filled in [facility]. Patient is requesting a higher or lower dose be called into pharmacy so it will be covered by insurance. Patient also reports that she is no longer taking Jardiance.      Please call patient to advise if able to accomodate her request.  [phone]

## 2020-01-09 ENCOUNTER — OFFICE VISIT (OUTPATIENT)
Dept: OBGYN CLINIC | Age: 65
End: 2020-01-09

## 2020-01-09 ENCOUNTER — HOSPITAL ENCOUNTER (OUTPATIENT)
Dept: LAB | Age: 65
Discharge: HOME | End: 2020-01-09

## 2020-01-09 VITALS
DIASTOLIC BLOOD PRESSURE: 58 MMHG | HEIGHT: 64 IN | SYSTOLIC BLOOD PRESSURE: 102 MMHG | WEIGHT: 175 LBS | BODY MASS INDEX: 29.88 KG/M2

## 2020-01-09 DIAGNOSIS — I10 ESSENTIAL HYPERTENSION, BENIGN: ICD-10-CM

## 2020-01-09 DIAGNOSIS — R39.15 URINARY URGENCY: ICD-10-CM

## 2020-01-09 DIAGNOSIS — Z12.31 BREAST CANCER SCREENING BY MAMMOGRAM: ICD-10-CM

## 2020-01-09 DIAGNOSIS — Z01.411 ENCOUNTER FOR WELL WOMAN EXAM WITH ABNORMAL FINDINGS: Primary | ICD-10-CM

## 2020-01-09 DIAGNOSIS — R92.8 ABNORMAL MAMMOGRAM OF RIGHT BREAST: ICD-10-CM

## 2020-01-09 DIAGNOSIS — E11.21 TYPE 2 DIABETES WITH NEPHROPATHY (HCC): ICD-10-CM

## 2020-01-09 DIAGNOSIS — R33.9 INCOMPLETE EMPTYING OF BLADDER: ICD-10-CM

## 2020-01-09 DIAGNOSIS — E03.9 ACQUIRED HYPOTHYROIDISM: ICD-10-CM

## 2020-01-09 DIAGNOSIS — E78.9 LIPID DISORDER: ICD-10-CM

## 2020-01-09 LAB
ALBUMIN SERPL-MCNC: 4.1 G/DL (ref 3.5–5)
ALBUMIN/GLOB SERPL: 1.3 {RATIO} (ref 1.1–2.2)
ALP SERPL-CCNC: 104 U/L (ref 45–117)
ALT SERPL-CCNC: 41 U/L (ref 12–78)
ANION GAP SERPL CALC-SCNC: 8 MMOL/L (ref 5–15)
AST SERPL-CCNC: 18 U/L (ref 15–37)
BILIRUB SERPL-MCNC: 0.4 MG/DL (ref 0.2–1)
BILIRUB UR QL STRIP: NEGATIVE
BUN SERPL-MCNC: 19 MG/DL (ref 6–20)
BUN/CREAT SERPL: 18 (ref 12–20)
CALCIUM SERPL-MCNC: 9.1 MG/DL (ref 8.5–10.1)
CHLORIDE SERPL-SCNC: 103 MMOL/L (ref 97–108)
CHOLEST SERPL-MCNC: 144 MG/DL
CO2 SERPL-SCNC: 24 MMOL/L (ref 21–32)
COMMENT, HOLDF: NORMAL
CREAT SERPL-MCNC: 1.04 MG/DL (ref 0.55–1.02)
CREAT UR-MCNC: 51.8 MG/DL
EST. AVERAGE GLUCOSE BLD GHB EST-MCNC: 192 MG/DL
GLOBULIN SER CALC-MCNC: 3.1 G/DL (ref 2–4)
GLUCOSE SERPL-MCNC: 194 MG/DL (ref 65–100)
GLUCOSE UR-MCNC: NEGATIVE MG/DL
HBA1C MFR BLD: 8.3 % (ref 4–5.6)
HDLC SERPL-MCNC: 45 MG/DL
HDLC SERPL: 3.2 {RATIO} (ref 0–5)
KETONES P FAST UR STRIP-MCNC: NEGATIVE MG/DL
LDLC SERPL CALC-MCNC: 37.6 MG/DL (ref 0–100)
LIPID PROFILE,FLP: ABNORMAL
MICROALBUMIN UR-MCNC: 1.03 MG/DL
MICROALBUMIN/CREAT UR-RTO: 20 MG/G (ref 0–30)
PH UR STRIP: 6 [PH] (ref 4.6–8)
POTASSIUM SERPL-SCNC: 4.2 MMOL/L (ref 3.5–5.1)
PROT SERPL-MCNC: 7.2 G/DL (ref 6.4–8.2)
PROT UR QL STRIP: NEGATIVE
SAMPLES BEING HELD,HOLD: NORMAL
SODIUM SERPL-SCNC: 135 MMOL/L (ref 136–145)
SP GR UR STRIP: 1 (ref 1–1.03)
T4 FREE SERPL-MCNC: 1.2 NG/DL (ref 0.8–1.5)
TRIGL SERPL-MCNC: 307 MG/DL (ref ?–150)
TSH SERPL DL<=0.05 MIU/L-ACNC: 1.19 UIU/ML (ref 0.36–3.74)
UA UROBILINOGEN AMB POC: NORMAL (ref 0.2–1)
URINALYSIS CLARITY POC: CLEAR
URINALYSIS COLOR POC: YELLOW
URINE BLOOD POC: NEGATIVE
URINE LEUKOCYTES POC: NEGATIVE
URINE NITRITES POC: NEGATIVE
VLDLC SERPL CALC-MCNC: 61.4 MG/DL

## 2020-01-09 NOTE — PATIENT INSTRUCTIONS

## 2020-01-09 NOTE — PROGRESS NOTES
[address] [facility]  [email or web address]  [phone]    Abram Mendoza MD, [address] [facility]       Annual Gynecologic Exam:   WWE 60+  Chief Complaint   Patient presents with    Well Woman    Other     urinary urgency         Michelle Shields is a 71 y.o. No obstetric history on file. WHITE OR   female who presents for an annual exam.    She does report additional concerns today. Patient states that she has had occasional urgency at night and she at times have to change positions to help empty her bladder. Sexual history and Contraception:  Social History     Substance and Sexual Activity   Sexual Activity Never    Partners: Male     She does not reports new sexual partner(s) in the last year. Preventive Medicine History:  Her most recent Pap smear result: normal was obtained in July 2016  Her most recent HR HPV screen was Negative obtained in 2016    She does not have a history of GABO 2, 3 or cervical cancer. Breast health:  Last mammogram: was normal.   A mammogram was scheduled for today but patient did not show for mammogram.  Breast cancer family updated: see FH. Bone health: Carlitos Shoemaker She has not had a bone density scan in the past.   Last bone density test results: not done. She does not have a history of osteopenia/osteoporosis. Osteoporosis family history updated: see FH.      Past Medical History:   Diagnosis Date    ADD (attention deficit disorder) 8/17/2009    Breast cancer ([address] 2012    Left Breast    Depressive disorder, not elsewhere classified 8/17/2009    Essential hypertension, benign 8/17/2009    Hypertension     Pap smear for cervical cancer screening 07/28/2016    negative, HPV negative    Psychiatric disorder 2007    panic attacks/PTSD    PTSD (post-traumatic stress disorder)     Thyroid disease     Unspecified hypothyroidism 8/17/2009     Past Surgical History:   Procedure Laterality Date    BREAST SURGERY PROCEDURE UNLISTED  January 2012    left breast biopsy    HX BREAST LUMPECTOMY Left 2012    HX GYN      c section    HX HEENT  1992    partial thyroidectomy    HX TONSILLECTOMY      HX UROLOGICAL  1992    Bladder suspension     Family History   Problem Relation Age of Onset    Diabetes Mother     Cancer Mother         breast    Stroke Mother     Breast Cancer Mother 57        80 second time    Hypertension Father     Elevated Lipids Father     Migraines Father     Dementia Father      Social History     Socioeconomic History    Marital status:      Spouse name: Not on file    Number of children: Not on file    Years of education: Not on file    Highest education level: Not on file   Occupational History    Not on file   Social Needs    Financial resource strain: Not on file    Food insecurity:     Worry: Not on file     Inability: Not on file    Transportation needs:     Medical: Not on file     Non-medical: Not on file   Tobacco Use    Smoking status: Never Smoker    Smokeless tobacco: Never Used   Substance and Sexual Activity    Alcohol use: No     Alcohol/week: 0.0 standard drinks    Drug use: No    Sexual activity: Never     Partners: Male   Lifestyle    Physical activity:     Days per week: Not on file     Minutes per session: Not on file    Stress: Not on file   Relationships    Social connections:     Talks on phone: Not on file     Gets together: Not on file     Attends Confucianist service: Not on file     Active member of club or organization: Not on file     Attends meetings of clubs or organizations: Not on file     Relationship status: Not on file    Intimate partner violence:     Fear of current or ex partner: Not on file     Emotionally abused: Not on file     Physically abused: Not on file     Forced sexual activity: Not on file   Other Topics Concern    Not on file   Social History Narrative    Not on file       No Known Allergies    Current Outpatient Medications   Medication Sig    dulaglutide (TRULICITY) 1.5 ZI/1.0 mL sub-q pen 0.5 mL by SubCUTAneous route every seven (7) days.  glipiZIDE SR (GLUCOTROL XL) 5 mg CR tablet TAKE 1 TABLET BY MOUTH EVERY DAY    levothyroxine (SYNTHROID) 137 mcg tablet TAKE 1 TABLET BY MOUTH EVERY DAY    lancets misc Use to test blood sugars once daily. Dx:E11.9    flash glucose sensor (FREESTYLE MICHAEL 14 DAY SENSOR) kit 1 Units by Other route Once every 2 weeks. APPLY 1 SENSOR EVERY 14 DAYS TO CHECK BLOOD SUGARS Dx:E11.9    QUEtiapine (SEROQUEL) 100 mg tablet Take 1 Tab by mouth three (3) times daily.  clonazePAM (KLONOPIN) 1 mg tablet Take 1 Tab by mouth three (3) times daily.  flash glucose scanning reader (FREESTYLE MICHAEL 14 DAY READER) [facility] Use to check blood sugars throughout the day    ONETOUCH ULTRA BLUE TEST STRIP strip TEST SUGARS ONCE DAILY AS DIRECTED    traZODone (DESYREL) 100 mg tablet TAKE 3 TABLETS BY MOUTH AT BEDTIME    DAILY MULTI-VITAMIN PO Take 1 Tab by mouth daily.  pantoprazole (PROTONIX) 40 mg tablet TAKE 1 TABLET BY MOUTH EVERY DAY    simvastatin (ZOCOR) 10 mg tablet Take 1 Tab by mouth nightly for 90 days.  rOPINIRole (REQUIP) 4 mg tab TAB Take 1 Tab by mouth daily for 30 days.  meloxicam (MOBIC) 15 mg tablet Take 1 Tab by mouth daily for 90 days.  dulaglutide (TRULICITY) 1.58 XY/1.4 mL sub-q pen 0.5 mL by SubCUTAneous route every seven (7) days for 30 days.  empagliflozin (JARDIANCE) 25 mg tablet TAKE 1 TABLET BY MOUTH EVERY DAY    triamcinolone acetonide (KENALOG) 0.1 % topical cream Apply  to affected area two (2) times a day. use thin layer    cyclobenzaprine (FLEXERIL) 10 mg tablet Take 1 Tab by mouth nightly.  metFORMIN ER (GLUCOPHAGE XR) 500 mg tablet TAKE TWO TABLETS BY MOUTH EVERY MORNING AND 1 TABLET AT NIGHT    fluticasone propionate (FLONASE) 50 mcg/actuation nasal spray 2 SPRAYS BY BOTH NOSTRILS ROUTE TWO (2) TIMES A DAY.  (Patient taking differently: 2 Sprays by Both Nostrils route two (2) times daily as needed.)    losartan (COZAAR) 100 mg tablet TAKE 1 TABLET BY MOUTH EVERY DAY    gabapentin (NEURONTIN) 300 mg capsule TAKE ONE CAPSULE BY MOUTH 3 TIMES A DAY    aspirin delayed-release 81 mg tablet Take 81 mg by mouth daily.  citalopram (CELEXA) 40 mg tablet TAKE 1 TABLET BY MOUTH EVERY DAY    methylphenidate (RITALIN) 20 mg tablet Take 2 Tabs by mouth two (2) times a day. No current facility-administered medications for this visit.         Patient Active Problem List   Diagnosis Code    Essential hypertension, benign I10    Hypothyroidism E03.9    ADD (attention deficit disorder) F98.8    Breast cancer ([facility] C50.919    GERD (gastroesophageal reflux disease) K21.9    Postmenopausal Z78.0    PTSD (post-traumatic stress disorder) F43.10    Type 2 diabetes mellitus without complication [facility] H11.3    Type 2 diabetes with nephropathy ([facility] E11.21       Review of Systems - History obtained from the patient  Constitutional: negative for weight loss, fever, night sweats  HEENT: negative for hearing loss, earache, congestion, snoring, sorethroat  CV: negative for chest pain, palpitations, edema  Resp: negative for cough, shortness of breath, wheezing  GI: negative for change in bowel habits, abdominal pain, black or bloody stools  : negative for frequency, dysuria, hematuria, vaginal discharge  MSK: negative for back pain, joint pain, muscle pain  Breast: negative for breast lumps, nipple discharge, galactorrhea  Skin :negative for itching, rash, hives  Neuro: negative for dizziness, headache, confusion, weakness  Psych: negative for anxiety, depression, change in mood  Heme/lymph: negative for bleeding, bruising, pallor    (WWE continued)    Physical Exam  Visit Vitals  /58   Ht 5' 4\" (1.626 m)   Wt 175 lb (79.4 kg)   BMI 30.04 kg/m²       Constitutional  · Appearance: well-nourished, well developed, alert, in no acute distress    HENT  · Head and Face: appears normal    Neck  · Inspection/Palpation: normal appearance, no masses or tenderness  · Lymph Nodes: no lymphadenopathy present  · Thyroid: gland size normal, nontender, no nodules or masses present on palpation    Chest  · Respiratory Effort: breathing unlabored  · Auscultation: normal breath sounds    Cardiovascular  · Heart:  · Auscultation: regular rate and rhythm without murmur    Breasts  · Inspection of Breasts: breasts symmetrical, no skin changes, no discharge present, nipple appearance normal, no skin retraction present, well healed scar right outer breast  · Palpation of Breasts and Axillae: no masses present on palpation, no breast tenderness  · Axillary Lymph Nodes: no lymphadenopathy present    Gastrointestinal  · Abdominal Examination: abdomen non-tender to palpation, normal bowel sounds, no masses present  · Liver and spleen: no hepatomegaly present, spleen not palpable  · Hernias: no hernias identified    Genitourinary  · External Genitalia: normal appearance for age, no discharge present, no tenderness present, no inflammatory lesions present, no masses present, with atrophy present  · Vagina: normal vaginal vault with posterior> ant vaginal laxity defects, no discharge present, no inflammatory lesions present, no masses present  · Bladder: non-tender to palpation  · Urethra: appears normal  · Cervix: normal   · Uterus: normal size, shape and consistency  · Adnexa: no adnexal tenderness present, no adnexal masses present  · Perineum: perineum within normal limits, no evidence of trauma, no rashes or skin lesions present  · Anus: anus within normal limits, no hemorrhoids present  · Inguinal Lymph Nodes: no lymphadenopathy present    Skin  · General Inspection: no rash, no lesions identified    Neurologic/Psychiatric  · Mental Status:  · Orientation: grossly oriented to person, place and time  · Mood and Affect: mood normal, affect appropriate    Assessment:  71 y.o. No obstetric history on file.  for well woman exam  Encounter Diagnoses   Name Primary?  Encounter for well woman exam with abnormal findings Yes    Urinary urgency     Incomplete emptying of bladder     Abnormal mammogram of right breast     Breast cancer screening by mammogram        Plan:  The patient was counseled about diet, exercise, healthy lifestyle  We discussed current self breast exam and mammogram recommendations  We discussed current pap smear and HR HPV testing guidelines  We recommend follow up in one year for routine annual gynecologic exam or sooner if needed  We recommend follow up with a primary care physician for any chronic medical problems or non-gynecologic concerns    We discussed calcium/vitamin D/weight bearing exercise and osteoporosis prevention and bone density screening recommendations. Handouts were given to the patient  Disc options for bladder sx: pelvic floor exercises, PT, surgery, pessary  Pt will try positional changes/double voiding and notify MD if NI  Reviewed breast screening : over due for diagnostic imaging     Folllow up:  [x] return for annual well woman exam in one year or sooner if she is having problems  [] follow up and ultrasound  [] mammogram  [] 6 months  [] 6 weeks   []     Orders Placed This Encounter    CULTURE, URINE    SARAY MAMMO RT DX INCL CAD    SARAY 3D NATHANIEL W MAMMO LT SCREENING INCL CAD    AMB POC URINALYSIS DIP STICK MANUAL W/O MICRO    PAP IG, HPV AND RFX HPV 18/71,04([id number])       No results found for any visits on 01/09/20.

## 2020-01-11 LAB — BACTERIA UR CULT: ABNORMAL

## 2020-01-12 LAB
CYTOLOGIST CVX/VAG CYTO: NORMAL
CYTOLOGY CVX/VAG DOC CYTO: NORMAL
CYTOLOGY CVX/VAG DOC THIN PREP: NORMAL
CYTOLOGY HISTORY:: NORMAL
DX ICD CODE: NORMAL
HPV I/H RISK 1 DNA CVX QL PROBE+SIG AMP: NEGATIVE
Lab: NORMAL
OTHER STN SPEC: NORMAL
STAT OF ADQ CVX/VAG CYTO-IMP: NORMAL

## 2020-01-21 DIAGNOSIS — I10 ESSENTIAL HYPERTENSION, BENIGN: Chronic | ICD-10-CM

## 2020-01-22 RX ORDER — LOSARTAN POTASSIUM 100 MG/1
TABLET ORAL
Qty: 90 TAB | Refills: 1 | Status: SHIPPED | OUTPATIENT
Start: 2020-01-22 | End: 2020-04-19

## 2020-02-03 ENCOUNTER — TELEPHONE (OUTPATIENT)
Dept: OBGYN CLINIC | Age: 65
End: 2020-02-03

## 2020-02-03 NOTE — TELEPHONE ENCOUNTER
Call received at 355PM    71year old patient last seen in the office on 1/9/2020    Patient calling about scheduling her diagnostic mammogram.  This nurse advised that is has been ordered and patient was provided with central scheduling phone number to set up appointment at [facility]. Patient verbalized understanding.

## 2020-02-17 ENCOUNTER — HOSPITAL ENCOUNTER (OUTPATIENT)
Dept: MAMMOGRAPHY | Age: 65
Discharge: HOME | End: 2020-02-17
Attending: OBSTETRICS & GYNECOLOGY
Payer: MEDICARE

## 2020-02-17 DIAGNOSIS — R92.8 ABNORMAL MAMMOGRAM OF RIGHT BREAST: ICD-10-CM

## 2020-02-17 PROCEDURE — 77067 SCR MAMMO BI INCL CAD: CPT

## 2020-02-17 PROCEDURE — 77066 DX MAMMO INCL CAD BI: CPT

## 2020-02-25 NOTE — PROGRESS NOTES
Breast imaging stable. Notify patient. Update chart.    Rec breast consult: +dense, ho breast cancer, fh breast cancer and breast calcifications to see if candidate for breast MRI, please order and notify pt

## 2020-03-09 RX ORDER — SIMVASTATIN 10 MG/1
10 TABLET, FILM COATED ORAL
Qty: 90 TAB | Refills: 0 | Status: SHIPPED | OUTPATIENT
Start: 2020-03-09 | End: 2020-06-16

## 2020-03-09 RX ORDER — MELOXICAM 15 MG/1
15 TABLET ORAL DAILY
Qty: 90 TAB | Refills: 0 | Status: SHIPPED | OUTPATIENT
Start: 2020-03-09 | End: 2020-03-09 | Stop reason: SDUPTHER

## 2020-03-09 RX ORDER — GLIPIZIDE 5 MG/1
5 TABLET, FILM COATED, EXTENDED RELEASE ORAL DAILY
Qty: 90 TAB | Refills: 1 | Status: SHIPPED | OUTPATIENT
Start: 2020-03-09 | End: 2020-03-09 | Stop reason: SDUPTHER

## 2020-03-10 RX ORDER — GLIPIZIDE 5 MG/1
5 TABLET, FILM COATED, EXTENDED RELEASE ORAL DAILY
Qty: 90 TAB | Refills: 1 | Status: SHIPPED | OUTPATIENT
Start: 2020-03-10 | End: 2020-06-06 | Stop reason: SDUPTHER

## 2020-03-10 RX ORDER — MELOXICAM 15 MG/1
15 TABLET ORAL DAILY
Qty: 90 TAB | Refills: 0 | Status: SHIPPED | OUTPATIENT
Start: 2020-03-10 | End: 2020-03-26

## 2020-03-18 DIAGNOSIS — E11.9 TYPE 2 DIABETES MELLITUS WITHOUT COMPLICATION, WITHOUT LONG-TERM CURRENT USE OF INSULIN (HCC): ICD-10-CM

## 2020-03-18 RX ORDER — METFORMIN HYDROCHLORIDE 500 MG/1
TABLET, EXTENDED RELEASE ORAL
Qty: 270 TAB | Refills: 1 | Status: SHIPPED | OUTPATIENT
Start: 2020-03-18 | End: 2020-09-06

## 2020-03-26 RX ORDER — MELOXICAM 15 MG/1
TABLET ORAL
Qty: 90 TAB | Refills: 0 | Status: SHIPPED | OUTPATIENT
Start: 2020-03-26 | End: 2020-12-15

## 2020-04-01 ENCOUNTER — OFFICE VISIT (OUTPATIENT)
Dept: INTERNAL MEDICINE CLINIC | Age: 65
End: 2020-04-01

## 2020-04-01 ENCOUNTER — HOSPITAL ENCOUNTER (OUTPATIENT)
Dept: LAB | Age: 65
Discharge: HOME | End: 2020-04-01

## 2020-04-01 VITALS
TEMPERATURE: 98.1 F | RESPIRATION RATE: 16 BRPM | DIASTOLIC BLOOD PRESSURE: 72 MMHG | HEART RATE: 89 BPM | BODY MASS INDEX: 28.65 KG/M2 | SYSTOLIC BLOOD PRESSURE: 128 MMHG | OXYGEN SATURATION: 96 % | HEIGHT: 64 IN | WEIGHT: 167.8 LBS

## 2020-04-01 DIAGNOSIS — E03.9 ACQUIRED HYPOTHYROIDISM: ICD-10-CM

## 2020-04-01 DIAGNOSIS — I10 ESSENTIAL HYPERTENSION, BENIGN: ICD-10-CM

## 2020-04-01 DIAGNOSIS — K21.00 GERD WITH ESOPHAGITIS: ICD-10-CM

## 2020-04-01 DIAGNOSIS — F33.1 MODERATE EPISODE OF RECURRENT MAJOR DEPRESSIVE DISORDER (HCC): ICD-10-CM

## 2020-04-01 DIAGNOSIS — E78.5 DYSLIPIDEMIA: ICD-10-CM

## 2020-04-01 DIAGNOSIS — E11.9 TYPE 2 DIABETES MELLITUS WITHOUT COMPLICATION, WITHOUT LONG-TERM CURRENT USE OF INSULIN (HCC): Primary | ICD-10-CM

## 2020-04-01 DIAGNOSIS — G25.81 RESTLESS LEG SYNDROME: ICD-10-CM

## 2020-04-01 DIAGNOSIS — G62.9 NEUROPATHY: ICD-10-CM

## 2020-04-01 DIAGNOSIS — E11.9 TYPE 2 DIABETES MELLITUS WITHOUT COMPLICATION, WITHOUT LONG-TERM CURRENT USE OF INSULIN (HCC): ICD-10-CM

## 2020-04-01 LAB
ALBUMIN SERPL-MCNC: 4.6 G/DL (ref 3.5–5)
ALBUMIN/GLOB SERPL: 1.5 {RATIO} (ref 1.1–2.2)
ALP SERPL-CCNC: 96 U/L (ref 45–117)
ALT SERPL-CCNC: 44 U/L (ref 12–78)
ANION GAP SERPL CALC-SCNC: 9 MMOL/L (ref 5–15)
AST SERPL-CCNC: 26 U/L (ref 15–37)
BILIRUB SERPL-MCNC: 0.4 MG/DL (ref 0.2–1)
BUN SERPL-MCNC: 24 MG/DL (ref 6–20)
BUN/CREAT SERPL: 26 (ref 12–20)
CALCIUM SERPL-MCNC: 9.9 MG/DL (ref 8.5–10.1)
CHLORIDE SERPL-SCNC: 103 MMOL/L (ref 97–108)
CHOLEST SERPL-MCNC: 180 MG/DL
CO2 SERPL-SCNC: 25 MMOL/L (ref 21–32)
CREAT SERPL-MCNC: 0.93 MG/DL (ref 0.55–1.02)
EST. AVERAGE GLUCOSE BLD GHB EST-MCNC: 146 MG/DL
GLOBULIN SER CALC-MCNC: 3 G/DL (ref 2–4)
GLUCOSE SERPL-MCNC: 144 MG/DL (ref 65–100)
HBA1C MFR BLD: 6.7 % (ref 4–5.6)
HDLC SERPL-MCNC: 54 MG/DL
HDLC SERPL: 3.3 {RATIO} (ref 0–5)
LDLC SERPL CALC-MCNC: 76.4 MG/DL (ref 0–100)
LIPID PROFILE,FLP: ABNORMAL
POTASSIUM SERPL-SCNC: 4.4 MMOL/L (ref 3.5–5.1)
PROT SERPL-MCNC: 7.6 G/DL (ref 6.4–8.2)
SODIUM SERPL-SCNC: 137 MMOL/L (ref 136–145)
T4 FREE SERPL-MCNC: 1.8 NG/DL (ref 0.8–1.5)
TRIGL SERPL-MCNC: 248 MG/DL (ref ?–150)
TSH SERPL DL<=0.05 MIU/L-ACNC: 0.96 UIU/ML (ref 0.36–3.74)
VLDLC SERPL CALC-MCNC: 49.6 MG/DL

## 2020-04-01 RX ORDER — GABAPENTIN 300 MG/1
300 CAPSULE ORAL 3 TIMES DAILY
Qty: 450 CAP | Refills: 1 | Status: SHIPPED | OUTPATIENT
Start: 2020-04-01 | End: 2020-09-27 | Stop reason: SDUPTHER

## 2020-04-01 RX ORDER — HYDROXYZINE HYDROCHLORIDE 10 MG/1
TABLET, FILM COATED ORAL
COMMUNITY
Start: 2020-03-09 | End: 2021-02-09 | Stop reason: ALTCHOICE

## 2020-04-01 NOTE — PROGRESS NOTES
HISTORY OF PRESENT ILLNESS  Patsy Huang is a 71 y.o. female. HPI  Hypertension ROS: taking medications as instructed, no medication side effects noted, no TIA's, no chest pain on exertion, no dyspnea on exertion, no swelling of ankles. New concerns: BP in office today is 128/72. Diabetic Review of Systems - further diabetic ROS: no polyuria or polydipsia, no chest pain, dyspnea or TIA's, no medication side effects noted. Other symptoms and concerns: Pt's last a1c was 8.3 on 1/09/20 with estimated BG of 192. Continues on Metformin  mg, Glipizide SR 5 mg, and Trulicity. Pt checks her BG in office, which is 156 right now, and an avg BG of 110-119 over the last month. Pt reports that she has been monitoring her diet closely and lost weight on her own. She was getting frustrated with the tightness of her clothes, so she changed her diet. She is very excited to see her a1c.      Neuropathy: Pt reports that she has been experiencing neuropathy x6 months. She notes that it is worse depending on her show choice. The pain is worse at night or when she elevates her feet. She notes that she is also using a neuropathy cream she found online, which also helps. She is currently taking Neurontin 1 tablet in the morning and afternoon and 2 at night per psychiatrist for her depression and RLS.      hypothyroidism. Lab Results   Component Value Date/Time    TSH 1.19 01/09/2020 02:47 PM     Thyroid ROS: denies fatigue, weight changes, heat/cold intolerance, bowel/skin changes or CVS symptoms. Continues on Synthroid 137 mcg.     Hyperlipidemia:  Cardiovascular risk analysis - 71 y.o. female LDL goal is under 100. ROS: taking medications as instructed, no medication side effects noted, no TIA's, no chest pain on exertion, no dyspnea on exertion, no swelling of ankles. Tolerating meds, no myalgias or other side effects noted  New concerns: Pt's last LDL was 37.6 on 1/09/20.  Continues on Zocor.     Mood: Much improved overall. Pt reports that her stress level has decreased since her  got motorized help with the staircase. Pt reports that her mother passed away in , which was very hard. She found a beautiful magenta dress with lace to wear for her mother's  and read a beautiful biblical grief poem. RLS: Stable, pt continues to comply with Requip. She notes that when she forgets to take her Requip, she does not notice more flares at night.     Sleep: Stable, pt continues to comply with Trazodone. GERD: Stable, pt continues to comply with Protonix. Review of Systems   All other systems reviewed and are negative. Physical Exam  Constitutional:       Appearance: Normal appearance. HENT:      Right Ear: Hearing, tympanic membrane and external ear normal.      Left Ear: Hearing, tympanic membrane and external ear normal.      Mouth/Throat:      Mouth: Mucous membranes are moist.      Pharynx: Oropharynx is clear. Cardiovascular:      Rate and Rhythm: Normal rate and regular rhythm. Pulmonary:      Effort: Pulmonary effort is normal.      Breath sounds: Normal breath sounds and air entry. Musculoskeletal: Normal range of motion. Skin:     General: Skin is warm and dry. Neurological:      General: No focal deficit present. Mental Status: She is alert and oriented to person, place, and time. Psychiatric:         Mood and Affect: Mood normal.         Behavior: Behavior normal.         ASSESSMENT and PLAN  Diagnoses and all orders for this visit:    1. Type 2 diabetes mellitus without complication, without long-term current use of insulin ([address]  Sugars presumed stable, will recheck today. Encouraged pt to continue to follow diabetic diet and monitor sugars.   -     MICROALBUMIN, UR, RAND W/ MICROALB/CREAT RATIO; Future  -     HEMOGLOBIN A1C WITH EAG; Future    2.  Essential hypertension, benign  BP is at goal. I do not recommend any change in medications.   -     METABOLIC PANEL, COMPREHENSIVE; Future    3. GERD with esophagitis  Stable and well-managed Protonix with no noted flares. No change in medications. 4. Acquired hypothyroidism  Thyroid presumed stable. Rechecking labs to confirm. Pt tolerating medication. I do not recommend a change in treatment plan. -     TSH 3RD GENERATION; Future  -     T4, FREE; Future    5. Restless leg syndrome  Continues on Neurontin to treat RLS, depression, and neuropathy. Discussed with pt that increasing Neurontin TID would probably make her feel drowsy. Informed pt that she can continue with 1 tablet in the morning and afternoon and 3 tablets in the evening if she stops her Requip. Will continue to monitor for improvements or changes. -     gabapentin (NEURONTIN) 300 mg capsule; Take 1 Cap by mouth three (3) times daily. Max Daily Amount: 900 mg. One in morning, one in afternoon and three at night    6. Neuropathy  Continues on Neurontin to treat RLS, depression, and neuropathy. Discussed with pt that increasing Neurontin TID would probably make her feel drowsy. Informed pt that she can continue with 1 tablet in the morning and afternoon and 3 tablets in the evening if she stops her Requip. Will continue to monitor for improvements or changes. -     gabapentin (NEURONTIN) 300 mg capsule; Take 1 Cap by mouth three (3) times daily. Max Daily Amount: 900 mg. One in morning, one in afternoon and three at night    7. Moderate episode of recurrent major depressive disorder (HCC)  Stable and well-managed. Continues on Neurontin to treat RLS, depression, and neuropathy. Discussed with pt that increasing Neurontin TID would probably make her feel drowsy. Informed pt that she can continue with 1 tablet in the morning and afternoon and 3 tablets in the evening if she stops her Requip.   Will continue to monitor for improvements or changes. -     gabapentin (NEURONTIN) 300 mg capsule; Take 1 Cap by mouth three (3) times daily. Max Daily Amount: 900 mg.  One in morning, one in afternoon and three at night    8. Dyslipidemia  Presumed stable, will recheck labs today. Patient is tolerating medications with no myalgias. I do not recommend any change in treatment plan. -     LIPID PANEL; Future       Lab results and schedule of future lab studies reviewed with patient. Reviewed diet, exercise and weight control. Written by Tomeka Angel, as dictated by Reed Nathan MD.     Current diagnosis and concerns discussed with pt at length. Understands risks and benefits or current treatment plan and medications and accepts the treatment and medication with any possible risks. Pt asks appropriate questions which were answered. Pt instructed to call with any concerns or problems.

## 2020-04-18 DIAGNOSIS — I10 ESSENTIAL HYPERTENSION, BENIGN: Chronic | ICD-10-CM

## 2020-04-19 RX ORDER — LOSARTAN POTASSIUM 100 MG/1
TABLET ORAL
Qty: 90 TAB | Refills: 1 | Status: SHIPPED | OUTPATIENT
Start: 2020-04-19 | End: 2021-01-14

## 2020-04-27 ENCOUNTER — VIRTUAL VISIT (OUTPATIENT)
Dept: INTERNAL MEDICINE CLINIC | Age: 65
End: 2020-04-27

## 2020-04-27 VITALS
SYSTOLIC BLOOD PRESSURE: 141 MMHG | HEART RATE: 86 BPM | BODY MASS INDEX: 28.53 KG/M2 | DIASTOLIC BLOOD PRESSURE: 88 MMHG | WEIGHT: 166.2 LBS

## 2020-04-27 DIAGNOSIS — E11.21 TYPE 2 DIABETES WITH NEPHROPATHY (HCC): ICD-10-CM

## 2020-04-27 DIAGNOSIS — I10 ESSENTIAL HYPERTENSION, BENIGN: ICD-10-CM

## 2020-04-27 DIAGNOSIS — M54.12 RIGHT CERVICAL RADICULOPATHY: Primary | ICD-10-CM

## 2020-04-27 RX ORDER — CYCLOBENZAPRINE HCL 10 MG
10 TABLET ORAL
Qty: 30 TAB | Refills: 0 | Status: SHIPPED | OUTPATIENT
Start: 2020-04-27 | End: 2020-05-27

## 2020-04-27 NOTE — PROGRESS NOTES
HISTORY OF PRESENT ILLNESS  Cecilia Carr is a 71 y.o. female who is present, aware, and consenting for real-time synchronous virtual video visit through [employer]. HPI  R sided neck pain: Pt reports that her  has fallen a few times and her pinched nerve has been flaring as a result. She can feel the pain in the lower back R shoulder blade. Pt confirms L sided occasional numbness/tingling in her fingertips. She is currently using her neighbors heating pad, Lidocaine, and tiger balm with some relief. Pt denies constant neuropathy radiation. Diabetic Review of Systems - further diabetic ROS: no polyuria or polydipsia, no chest pain, dyspnea or TIA's, no numbness, tingling or pain in extremities. Other symptoms and concerns: Pt's last a1c was 6.7 on 4/01/20 with an estimated BG of 146. Pt continues to monitor her diet and lose weight. Hypertension ROS: taking medications as instructed, no medication side effects noted, no TIA's, no chest pain on exertion, no dyspnea on exertion, no swelling of ankles. New concerns: BP at home today was 141/88. Review of Systems   Musculoskeletal: Positive for neck pain. All other systems reviewed and are negative. Physical Exam  Vitals signs reviewed. Constitutional:       General: She is not in acute distress. Appearance: Normal appearance. She is not ill-appearing, toxic-appearing or diaphoretic. HENT:      Right Ear: Hearing normal.      Left Ear: Hearing normal.      Nose: Nose normal.      Mouth/Throat:      Mouth: Mucous membranes are moist.      Pharynx: Oropharynx is clear. Eyes:      Conjunctiva/sclera: Conjunctivae normal.   Neck:      Musculoskeletal: Normal range of motion. Comments: Pain noted when tilting head to L side. Pulmonary:      Effort: No respiratory distress. Breath sounds: Normal air entry. Musculoskeletal: Normal range of motion. Skin:     General: Skin is warm and dry.    Neurological:      General: No focal deficit present. Mental Status: She is alert and oriented to person, place, and time. Mental status is at baseline. Psychiatric:         Mood and Affect: Mood normal.         Behavior: Behavior normal.         Thought Content: Thought content normal.         Judgment: Judgment normal.         ASSESSMENT and PLAN  Diagnoses and all orders for this visit:    1. Right cervical radiculopathy  Prescribed Flexeril. Warned pt that it can be sedating. Encouraged pt to stretch and move her neck consistently. Explained that if she does not stretch her shoulder and neck, it will continue to stiffen and her pain will worsen. Discussed with pt that if her pain does not improve then her next step should be PT. Will continue to monitor for improvements or changes. -     cyclobenzaprine (FLEXERIL) 10 mg tablet; Take 1 Tab by mouth nightly. 2. Type 2 diabetes with nephropathy (HCC)  Sugars stable. Continue to follow diabetic diet and monitor sugars. 3. Essential hypertension, benign  BP is relatively at goal at home. I do not recommend any change in medications. Lab results and schedule of future lab studies reviewed with patient. Reviewed diet, exercise and weight control. Written by Dominic Lefort, as dictated by Jyoti Chapman MD.     Current diagnosis and concerns discussed with pt at length. Understands risks and benefits or current treatment plan and medications and accepts the treatment and medication with any possible risks. Pt asks appropriate questions which were answered. Pt instructed to call with any concerns or problems.

## 2020-05-27 DIAGNOSIS — M54.12 RIGHT CERVICAL RADICULOPATHY: ICD-10-CM

## 2020-05-27 RX ORDER — CYCLOBENZAPRINE HCL 10 MG
TABLET ORAL
Qty: 30 TAB | Refills: 0 | Status: SHIPPED | OUTPATIENT
Start: 2020-05-27 | End: 2020-06-21

## 2020-06-08 RX ORDER — GLIPIZIDE 5 MG/1
5 TABLET, FILM COATED, EXTENDED RELEASE ORAL DAILY
Qty: 90 TAB | Refills: 1 | Status: SHIPPED | OUTPATIENT
Start: 2020-06-08 | End: 2020-10-15

## 2020-06-08 RX ORDER — FLASH GLUCOSE SENSOR
1 KIT MISCELLANEOUS EVERY 2 WEEKS
Qty: 2 KIT | Refills: 3 | Status: SHIPPED | OUTPATIENT
Start: 2020-06-08 | End: 2021-02-27

## 2020-06-16 RX ORDER — SIMVASTATIN 10 MG/1
TABLET, FILM COATED ORAL
Qty: 90 TAB | Refills: 0 | Status: SHIPPED | OUTPATIENT
Start: 2020-06-16 | End: 2020-09-08

## 2020-06-21 DIAGNOSIS — M54.12 RIGHT CERVICAL RADICULOPATHY: ICD-10-CM

## 2020-06-21 RX ORDER — CYCLOBENZAPRINE HCL 10 MG
TABLET ORAL
Qty: 30 TAB | Refills: 0 | Status: SHIPPED | OUTPATIENT
Start: 2020-06-21 | End: 2020-08-02

## 2020-06-24 ENCOUNTER — OFFICE VISIT (OUTPATIENT)
Dept: INTERNAL MEDICINE CLINIC | Age: 65
End: 2020-06-24

## 2020-06-24 DIAGNOSIS — E11.9 TYPE 2 DIABETES MELLITUS WITHOUT COMPLICATION, WITHOUT LONG-TERM CURRENT USE OF INSULIN (HCC): Primary | ICD-10-CM

## 2020-06-24 LAB — HBA1C MFR BLD HPLC: 6.4 %

## 2020-06-24 RX ORDER — DIVALPROEX SODIUM 500 MG/1
1000 TABLET, EXTENDED RELEASE ORAL DAILY
COMMUNITY
End: 2020-11-25 | Stop reason: SDUPTHER

## 2020-06-24 NOTE — PROGRESS NOTES
CC:  Diabetes management/education    S/O: Dayo Donald is a 71 y.o. female referred by Dr. Ankit Fenton MD for diabetes management. HPI: Patient is here to discuss labile blood sugars. She's been wearing the jamie device and numbers are going from too low to sometimes a little higher. She feels bad when they are going low. Mood: noted that she was having more manic/anger issues and disucssed this with psychatrist. Trying depakote now to see if this helps. This got a lot worse after her moms passing in January and especially during quarantine. Current Diabetes Regimen:  Trulicity 1.6SP once weekly  Glipizide ER 5mg once daily  Metformin 500mg 2 in the morning and 1 at night    Stopped Jardiance 2/2 cost    ROS:   Patient denies hypoglycemic and hyperglycemic signs/symptoms, chest pain, shortness of breath, neuropathy, vision changes, and any foot changes. Self-Monitoring Blood Glucose:  Patient using freestyle jamie  Numbers overall look really good but she's actually having quite a few low blood sugar episodes    Diet:  Still working on eating healthier foods  Tries to make good decisions  Hasn't been doing that great during quarantine and since her mom passed away in January. Data reviewed:  Lab Results   Component Value Date/Time    Hemoglobin A1c 6.7 (H) 04/01/2020 11:35 AM    Hemoglobin A1c (POC) 6.4 06/24/2020 02:09 PM         Diabetes Health Maintenance:  Last eye exam: confirm with patient  Last foot exam: 11/2019  Last influenza vaccine: 8/2019  Last Pneumovax 23 vaccine: 11/2019  Last Prevnar-13 vaccine: 5/2018  Hepatitis B Series: unknown  ASA Therapy: none  ACE/ARB Therapy: losartan 100  Statin Therapy: simvastatin 10    Assessment/Plan:     1. Diabetes:  a1c at goal <7% and is even lower at 6.4% today. Advised patient to STOP glipizide as this is likely causing the hypoglycemia.  Patient is to continue with other medications and touch base with me to let me know how things are going. She also is going to call freestyle to see about getting sensor replaced that fell off. Had patient sign application for trulicity PAP and she will let me know if her income falls into the category requirements for help. Patient verbalized understanding of the information presented and all of the patients questions were answered. AVS was handed to the patient and information reviewed. Patient advised to call me or Dr. Jose G Turner MD with any additional questions or concerns. Follow-up: call in about 1 month  Notification of recommendations will be sent to Dr. Jose G Turner MD for review.       Sapna Llanos PharmD, BCPS, CDCES      CLINICAL PHARMACY CONSULT: MED RECONCILIATION/REVIEW ADDENDUM    For Pharmacy Admin Tracking Only    PHSO: PHSO Patient?: No  Total # of Interventions Recommended: Count: 1  - Discontinued Medication #: 1 Discontinue Reason(s): SURINDER  Total Interventions Accepted: 1  Time Spent (min): 45

## 2020-06-26 ENCOUNTER — TELEPHONE (OUTPATIENT)
Dept: INTERNAL MEDICINE CLINIC | Age: 65
End: 2020-06-26

## 2020-06-26 NOTE — TELEPHONE ENCOUNTER
----- Message from Vale Prather sent at 6/26/2020 12:54 PM EDT -----  Regarding: Linus/Shala  Contact: [phone]  Appointment Request From: Haim Zhao    With Provider: Kira Keller MD [-Primary Care Physician-]    Preferred Date Range: Any date 6/25/2020 or later    Preferred Times: Any    Reason: To address the following health maintenance concerns. Eye Exam Retinal Or Dilated    Comments:  Go every October.  If you want results i can have them sent to you

## 2020-06-29 NOTE — TELEPHONE ENCOUNTER
Spoke with patient and advised her that we do not have copies of her eye exams. Pt stated that she will have them faxed over.

## 2020-07-31 ENCOUNTER — TELEPHONE (OUTPATIENT)
Dept: INTERNAL MEDICINE CLINIC | Age: 65
End: 2020-07-31

## 2020-07-31 NOTE — TELEPHONE ENCOUNTER
Pt requesting to speak with pharmacist as Rajiv Maher is working on something\" for her. Please return call. Thanks.

## 2020-08-01 DIAGNOSIS — M54.12 RIGHT CERVICAL RADICULOPATHY: ICD-10-CM

## 2020-08-02 RX ORDER — CYCLOBENZAPRINE HCL 10 MG
TABLET ORAL
Qty: 30 TAB | Refills: 0 | Status: SHIPPED | OUTPATIENT
Start: 2020-08-02 | End: 2020-12-04

## 2020-08-04 NOTE — TELEPHONE ENCOUNTER
Returned call. Patient inquiring about patient assistnace application. Advised will call Michel and follow up as needed.     Jem Burkett, PharmD, BCPS, CDCES

## 2020-08-13 ENCOUNTER — TELEPHONE (OUTPATIENT)
Dept: INTERNAL MEDICINE CLINIC | Age: 65
End: 2020-08-13

## 2020-09-06 DIAGNOSIS — E11.9 TYPE 2 DIABETES MELLITUS WITHOUT COMPLICATION, WITHOUT LONG-TERM CURRENT USE OF INSULIN (HCC): ICD-10-CM

## 2020-09-06 DIAGNOSIS — E03.9 ACQUIRED HYPOTHYROIDISM: ICD-10-CM

## 2020-09-06 RX ORDER — METFORMIN HYDROCHLORIDE 500 MG/1
TABLET, EXTENDED RELEASE ORAL
Qty: 270 TAB | Refills: 1 | Status: SHIPPED | OUTPATIENT
Start: 2020-09-06 | End: 2021-03-02

## 2020-09-06 RX ORDER — LEVOTHYROXINE SODIUM 137 UG/1
TABLET ORAL
Qty: 90 TAB | Refills: 2 | Status: SHIPPED | OUTPATIENT
Start: 2020-09-06 | End: 2021-05-28

## 2020-09-08 RX ORDER — SIMVASTATIN 10 MG/1
TABLET, FILM COATED ORAL
Qty: 90 TAB | Refills: 0 | Status: SHIPPED | OUTPATIENT
Start: 2020-09-08 | End: 2021-10-04

## 2020-09-27 DIAGNOSIS — G25.81 RESTLESS LEG SYNDROME: ICD-10-CM

## 2020-09-27 DIAGNOSIS — F33.1 MODERATE EPISODE OF RECURRENT MAJOR DEPRESSIVE DISORDER (HCC): ICD-10-CM

## 2020-09-27 DIAGNOSIS — G62.9 NEUROPATHY: ICD-10-CM

## 2020-09-28 RX ORDER — GABAPENTIN 300 MG/1
CAPSULE ORAL
Qty: 150 CAP | Refills: 0 | Status: SHIPPED | OUTPATIENT
Start: 2020-09-28 | End: 2020-11-02

## 2020-09-30 DIAGNOSIS — K21.00 GERD WITH ESOPHAGITIS: ICD-10-CM

## 2020-09-30 RX ORDER — PANTOPRAZOLE SODIUM 40 MG/1
TABLET, DELAYED RELEASE ORAL
Qty: 90 TAB | Refills: 1 | Status: SHIPPED | OUTPATIENT
Start: 2020-09-30 | End: 2021-03-23

## 2020-10-15 ENCOUNTER — OFFICE VISIT (OUTPATIENT)
Dept: INTERNAL MEDICINE CLINIC | Age: 65
End: 2020-10-15
Payer: MEDICARE

## 2020-10-15 VITALS
WEIGHT: 145 LBS | HEIGHT: 64 IN | HEART RATE: 70 BPM | BODY MASS INDEX: 24.75 KG/M2 | SYSTOLIC BLOOD PRESSURE: 120 MMHG | DIASTOLIC BLOOD PRESSURE: 80 MMHG

## 2020-10-15 DIAGNOSIS — G62.9 NEUROPATHY: ICD-10-CM

## 2020-10-15 DIAGNOSIS — F33.1 MODERATE EPISODE OF RECURRENT MAJOR DEPRESSIVE DISORDER (HCC): ICD-10-CM

## 2020-10-15 DIAGNOSIS — K21.9 GASTROESOPHAGEAL REFLUX DISEASE WITHOUT ESOPHAGITIS: ICD-10-CM

## 2020-10-15 DIAGNOSIS — I10 ESSENTIAL HYPERTENSION, BENIGN: ICD-10-CM

## 2020-10-15 DIAGNOSIS — G25.81 RESTLESS LEG SYNDROME: ICD-10-CM

## 2020-10-15 DIAGNOSIS — E03.4 HYPOTHYROIDISM DUE TO ACQUIRED ATROPHY OF THYROID: ICD-10-CM

## 2020-10-15 DIAGNOSIS — E11.21 TYPE 2 DIABETES MELLITUS WITH DIABETIC NEPHROPATHY, WITHOUT LONG-TERM CURRENT USE OF INSULIN (HCC): Primary | ICD-10-CM

## 2020-10-15 DIAGNOSIS — E78.2 MIXED HYPERLIPIDEMIA: ICD-10-CM

## 2020-10-15 LAB
ALBUMIN SERPL-MCNC: 3.9 G/DL (ref 3.5–5)
ALBUMIN/GLOB SERPL: 1.1 {RATIO} (ref 1.1–2.2)
ALP SERPL-CCNC: 70 U/L (ref 45–117)
ALT SERPL-CCNC: 26 U/L (ref 12–78)
ANION GAP SERPL CALC-SCNC: 8 MMOL/L (ref 5–15)
AST SERPL-CCNC: 20 U/L (ref 15–37)
BILIRUB SERPL-MCNC: 0.3 MG/DL (ref 0.2–1)
BUN SERPL-MCNC: 17 MG/DL (ref 6–20)
BUN/CREAT SERPL: 20 (ref 12–20)
CALCIUM SERPL-MCNC: 9.7 MG/DL (ref 8.5–10.1)
CHLORIDE SERPL-SCNC: 104 MMOL/L (ref 97–108)
CHOLEST SERPL-MCNC: 185 MG/DL
CO2 SERPL-SCNC: 27 MMOL/L (ref 21–32)
CREAT SERPL-MCNC: 0.83 MG/DL (ref 0.55–1.02)
CREAT UR-MCNC: 69 MG/DL
EST. AVERAGE GLUCOSE BLD GHB EST-MCNC: 128 MG/DL
GLOBULIN SER CALC-MCNC: 3.4 G/DL (ref 2–4)
GLUCOSE SERPL-MCNC: 112 MG/DL (ref 65–100)
HBA1C MFR BLD: 6.1 % (ref 4–5.6)
HDLC SERPL-MCNC: 63 MG/DL
HDLC SERPL: 2.9 {RATIO} (ref 0–5)
LDLC SERPL CALC-MCNC: 94.6 MG/DL (ref 0–100)
LIPID PROFILE,FLP: NORMAL
MICROALBUMIN UR-MCNC: 1.14 MG/DL
MICROALBUMIN/CREAT UR-RTO: 17 MG/G (ref 0–30)
POTASSIUM SERPL-SCNC: 4.6 MMOL/L (ref 3.5–5.1)
PROT SERPL-MCNC: 7.3 G/DL (ref 6.4–8.2)
SODIUM SERPL-SCNC: 139 MMOL/L (ref 136–145)
TRIGL SERPL-MCNC: 137 MG/DL (ref ?–150)
VLDLC SERPL CALC-MCNC: 27.4 MG/DL

## 2020-10-15 PROCEDURE — 2022F DILAT RTA XM EVC RTNOPTHY: CPT | Performed by: INTERNAL MEDICINE

## 2020-10-15 PROCEDURE — 1101F PT FALLS ASSESS-DOCD LE1/YR: CPT | Performed by: INTERNAL MEDICINE

## 2020-10-15 PROCEDURE — 3017F COLORECTAL CA SCREEN DOC REV: CPT | Performed by: INTERNAL MEDICINE

## 2020-10-15 PROCEDURE — G8536 NO DOC ELDER MAL SCRN: HCPCS | Performed by: INTERNAL MEDICINE

## 2020-10-15 PROCEDURE — G9899 SCRN MAM PERF RSLTS DOC: HCPCS | Performed by: INTERNAL MEDICINE

## 2020-10-15 PROCEDURE — G8752 SYS BP LESS 140: HCPCS | Performed by: INTERNAL MEDICINE

## 2020-10-15 PROCEDURE — G8427 DOCREV CUR MEDS BY ELIG CLIN: HCPCS | Performed by: INTERNAL MEDICINE

## 2020-10-15 PROCEDURE — G8420 CALC BMI NORM PARAMETERS: HCPCS | Performed by: INTERNAL MEDICINE

## 2020-10-15 PROCEDURE — G0463 HOSPITAL OUTPT CLINIC VISIT: HCPCS | Performed by: INTERNAL MEDICINE

## 2020-10-15 PROCEDURE — 3044F HG A1C LEVEL LT 7.0%: CPT | Performed by: INTERNAL MEDICINE

## 2020-10-15 PROCEDURE — G8432 DEP SCR NOT DOC, RNG: HCPCS | Performed by: INTERNAL MEDICINE

## 2020-10-15 PROCEDURE — G8400 PT W/DXA NO RESULTS DOC: HCPCS | Performed by: INTERNAL MEDICINE

## 2020-10-15 PROCEDURE — 1090F PRES/ABSN URINE INCON ASSESS: CPT | Performed by: INTERNAL MEDICINE

## 2020-10-15 PROCEDURE — G8754 DIAS BP LESS 90: HCPCS | Performed by: INTERNAL MEDICINE

## 2020-10-15 PROCEDURE — 99214 OFFICE O/P EST MOD 30 MIN: CPT | Performed by: INTERNAL MEDICINE

## 2020-10-15 NOTE — PROGRESS NOTES
HISTORY OF PRESENT ILLNESS  Yeimy Garcia is a 76 y.o. female. HPI  Diabetic Review of Systems - further diabetic ROS: no polyuria or polydipsia, no chest pain, dyspnea or TIA's, no numbness, tingling or pain in extremities. Other symptoms and concerns: Pt's last a1c was 6.7 on 4/1/20 with an estimated BG of 146. Pt DC her Glipizide. Continues in Trulicity and Metformin. Pt reports monitoring BP at home at reports values averaging below 150. Pt notes that she eats a banana for breakfast, a roll with peanut butter and banana for lunch, a protein bar for snack, and dinner varies. She notes that she cannot tolerate Boost shakes. Hypertension ROS: taking medications as instructed, no medication side effects noted, no TIA's, no chest pain on exertion, no dyspnea on exertion, no swelling of ankles. New concerns: BP in office today is 120/80. Continues on Losartan. Pt reports monitoring BP at home at reports values averaging 120/80. Hyperlipidemia:  Cardiovascular risk analysis - 76 y.o. female LDL goal is under 100. ROS: taking medications as instructed, no medication side effects noted, no TIA's, no chest pain on exertion, no dyspnea on exertion, no swelling of ankles. Tolerating meds, no myalgias or other side effects noted. New concerns: Pt's last LDL was 76.4 on 4/1/20. Mood: Followed by psychiatrist and counselor. Continues on Ritalin, Trazodone, Depakote, Seroquel, and Klonopin as per psychiatrist. She notes that it is lonely and stressed taking care of her . Her  refuses to look into assisted of independent living. Pt notes that her stress is causing her to have no appetite. GERD: Stable, pt continues to comply with Protonix. hypothyroidism. Lab Results   Component Value Date/Time    TSH 0.96 04/01/2020 11:35 AM     Thyroid ROS: denies fatigue, weight changes, heat/cold intolerance, bowel/skin changes or CVS symptoms.      Neuropathy: Stable, pt continues to comply with Gabapentin. Pt notes that her neuropathy has partially improved with weight loss. RLS: Stable, pt continues to comply with Requip. Arthralgia: Stable, pt continues to comply with meloxicam.     Review of Systems   All other systems reviewed and are negative. Physical Exam  Constitutional:       Appearance: Normal appearance. HENT:      Right Ear: Hearing, tympanic membrane and external ear normal.      Left Ear: Hearing, tympanic membrane and external ear normal.      Mouth/Throat:      Mouth: Mucous membranes are moist.      Pharynx: Oropharynx is clear. Cardiovascular:      Rate and Rhythm: Normal rate and regular rhythm. Pulses: Normal pulses. Heart sounds: Normal heart sounds. Pulmonary:      Effort: Pulmonary effort is normal.      Breath sounds: Normal breath sounds and air entry. Musculoskeletal: Normal range of motion. Skin:     General: Skin is warm and dry. Neurological:      General: No focal deficit present. Mental Status: She is alert and oriented to person, place, and time. Psychiatric:         Mood and Affect: Mood normal.         Behavior: Behavior normal.         ASSESSMENT and PLAN  Diagnoses and all orders for this visit:    1. Type 2 diabetes mellitus with diabetic nephropathy, without long-term current use of insulin ([address]  Sugars presumed stable, will recheck today. Continue to follow diabetic diet and monitor sugars.   -     MICROALBUMIN, UR, RAND W/ MICROALB/CREAT RATIO; Future  -     HEMOGLOBIN A1C WITH EAG; Future    2. Essential hypertension, benign  BP is at goal. I do not recommend any change in medications.   -     METABOLIC PANEL, COMPREHENSIVE; Future    3. Moderate episode of recurrent major depressive disorder (HCC)  Stable and well-managed. No change in medications. 4. Gastroesophageal reflux disease without esophagitis  Stable and well-managed. No change in medications.      5. Hypothyroidism due to acquired atrophy of thyroid  Thyroid stable. I do not recommend a change in medications. 6. Neuropathy  Stable and well-managed. No change in medications. 7. Restless leg syndrome  Stable and well-managed. No change in medications. 8. Mixed hyperlipidemia  Presumed stable, will recheck labs today. Patient is tolerating medications with no myalgias. I do not recommend any change in treatment plan. -     LIPID PANEL; Future    Lab results and schedule of future lab studies reviewed with patient. Reviewed diet, exercise and weight control. Written by Eric Bernardo, as dictated by Chun Sanchez MD.     Current diagnosis and concerns discussed with pt at length. Understands risks and benefits or current treatment plan and medications and accepts the treatment and medication with any possible risks. Pt asks appropriate questions which were answered. Pt instructed to call with any concerns or problems.

## 2020-11-02 DIAGNOSIS — G25.81 RESTLESS LEG SYNDROME: ICD-10-CM

## 2020-11-02 DIAGNOSIS — G62.9 NEUROPATHY: ICD-10-CM

## 2020-11-02 DIAGNOSIS — F33.1 MODERATE EPISODE OF RECURRENT MAJOR DEPRESSIVE DISORDER (HCC): ICD-10-CM

## 2020-11-02 RX ORDER — GABAPENTIN 300 MG/1
CAPSULE ORAL
Qty: 150 CAP | Refills: 0 | Status: SHIPPED | OUTPATIENT
Start: 2020-11-02 | End: 2020-11-30

## 2020-11-25 ENCOUNTER — TELEPHONE (OUTPATIENT)
Dept: INTERNAL MEDICINE CLINIC | Age: 65
End: 2020-11-25

## 2020-11-25 RX ORDER — DIVALPROEX SODIUM 500 MG/1
1000 TABLET, EXTENDED RELEASE ORAL DAILY
Qty: 60 TAB | Refills: 0 | Status: SHIPPED | OUTPATIENT
Start: 2020-11-25 | End: 2020-12-17 | Stop reason: SDUPTHER

## 2020-11-25 NOTE — TELEPHONE ENCOUNTER
Spoke with patient and she states that she has been out of her depakote for about a week. She has placed several phone calls to the nurse practitioner that has been writing it for her without a response. She states that she has already found a new provider but is requesting that  send a 30 day refill to her pharmacy until her appt. Advised pt that Dr. Hernan Willis is out of the office today but will be here Friday. Pt stated that she is ok to wait for her to fill rx.

## 2020-11-25 NOTE — TELEPHONE ENCOUNTER
----- Message from [address] sent at 11/25/2020 10:03 AM EST -----  Regarding: Dr. Dayne Sierra first and last name: Pt      Reason for call: [employer]/Provider Call      Callback required yes/no and why: Yes      Best contact number(s): [phone]      Details to clarify the request: Pt requesting assistance to access [employer] account. Pt also requesting to speak to Nurse or Provider.       Marc Bergman

## 2020-11-29 DIAGNOSIS — G62.9 NEUROPATHY: ICD-10-CM

## 2020-11-29 DIAGNOSIS — G25.81 RESTLESS LEG SYNDROME: ICD-10-CM

## 2020-11-29 DIAGNOSIS — F33.1 MODERATE EPISODE OF RECURRENT MAJOR DEPRESSIVE DISORDER (HCC): ICD-10-CM

## 2020-11-30 RX ORDER — GABAPENTIN 300 MG/1
CAPSULE ORAL
Qty: 150 CAP | Refills: 0 | Status: SHIPPED | OUTPATIENT
Start: 2020-11-30 | End: 2021-01-02

## 2020-12-04 ENCOUNTER — OFFICE VISIT (OUTPATIENT)
Dept: INTERNAL MEDICINE CLINIC | Age: 65
End: 2020-12-04
Payer: MEDICARE

## 2020-12-04 ENCOUNTER — TELEPHONE (OUTPATIENT)
Dept: INTERNAL MEDICINE CLINIC | Age: 65
End: 2020-12-04

## 2020-12-04 VITALS
OXYGEN SATURATION: 99 % | BODY MASS INDEX: 23.49 KG/M2 | SYSTOLIC BLOOD PRESSURE: 142 MMHG | TEMPERATURE: 97.6 F | WEIGHT: 137.6 LBS | HEIGHT: 64 IN | HEART RATE: 73 BPM | DIASTOLIC BLOOD PRESSURE: 84 MMHG | RESPIRATION RATE: 18 BRPM

## 2020-12-04 DIAGNOSIS — I10 ESSENTIAL HYPERTENSION, BENIGN: ICD-10-CM

## 2020-12-04 DIAGNOSIS — F33.1 MODERATE EPISODE OF RECURRENT MAJOR DEPRESSIVE DISORDER (HCC): ICD-10-CM

## 2020-12-04 DIAGNOSIS — E78.2 MIXED HYPERLIPIDEMIA: ICD-10-CM

## 2020-12-04 DIAGNOSIS — Z00.00 MEDICARE ANNUAL WELLNESS VISIT, SUBSEQUENT: Primary | ICD-10-CM

## 2020-12-04 DIAGNOSIS — Z78.0 POST-MENOPAUSAL: ICD-10-CM

## 2020-12-04 DIAGNOSIS — E03.4 HYPOTHYROIDISM DUE TO ACQUIRED ATROPHY OF THYROID: ICD-10-CM

## 2020-12-04 DIAGNOSIS — E11.21 TYPE 2 DIABETES MELLITUS WITH DIABETIC NEPHROPATHY, WITHOUT LONG-TERM CURRENT USE OF INSULIN (HCC): ICD-10-CM

## 2020-12-04 DIAGNOSIS — H93.8X3 EAR FULLNESS, BILATERAL: ICD-10-CM

## 2020-12-04 LAB
T4 FREE SERPL-MCNC: 2.1 NG/DL (ref 0.8–1.5)
TSH SERPL DL<=0.05 MIU/L-ACNC: 0.33 UIU/ML (ref 0.36–3.74)

## 2020-12-04 PROCEDURE — G0439 PPPS, SUBSEQ VISIT: HCPCS | Performed by: INTERNAL MEDICINE

## 2020-12-04 PROCEDURE — G8420 CALC BMI NORM PARAMETERS: HCPCS | Performed by: INTERNAL MEDICINE

## 2020-12-04 PROCEDURE — 3017F COLORECTAL CA SCREEN DOC REV: CPT | Performed by: INTERNAL MEDICINE

## 2020-12-04 PROCEDURE — G0463 HOSPITAL OUTPT CLINIC VISIT: HCPCS | Performed by: INTERNAL MEDICINE

## 2020-12-04 PROCEDURE — G9899 SCRN MAM PERF RSLTS DOC: HCPCS | Performed by: INTERNAL MEDICINE

## 2020-12-04 PROCEDURE — 3044F HG A1C LEVEL LT 7.0%: CPT | Performed by: INTERNAL MEDICINE

## 2020-12-04 PROCEDURE — G8536 NO DOC ELDER MAL SCRN: HCPCS | Performed by: INTERNAL MEDICINE

## 2020-12-04 PROCEDURE — 2022F DILAT RTA XM EVC RTNOPTHY: CPT | Performed by: INTERNAL MEDICINE

## 2020-12-04 PROCEDURE — G8400 PT W/DXA NO RESULTS DOC: HCPCS | Performed by: INTERNAL MEDICINE

## 2020-12-04 PROCEDURE — 1101F PT FALLS ASSESS-DOCD LE1/YR: CPT | Performed by: INTERNAL MEDICINE

## 2020-12-04 PROCEDURE — G8753 SYS BP > OR = 140: HCPCS | Performed by: INTERNAL MEDICINE

## 2020-12-04 PROCEDURE — 99214 OFFICE O/P EST MOD 30 MIN: CPT | Performed by: INTERNAL MEDICINE

## 2020-12-04 PROCEDURE — G8427 DOCREV CUR MEDS BY ELIG CLIN: HCPCS | Performed by: INTERNAL MEDICINE

## 2020-12-04 PROCEDURE — G8754 DIAS BP LESS 90: HCPCS | Performed by: INTERNAL MEDICINE

## 2020-12-04 PROCEDURE — G8432 DEP SCR NOT DOC, RNG: HCPCS | Performed by: INTERNAL MEDICINE

## 2020-12-04 PROCEDURE — 1090F PRES/ABSN URINE INCON ASSESS: CPT | Performed by: INTERNAL MEDICINE

## 2020-12-04 NOTE — PROGRESS NOTES
This is the Subsequent Medicare Annual Wellness Exam, performed 12 months or more after the Initial AWV or the last Subsequent AWV    I have reviewed the patient's medical history in detail and updated the computerized patient record. Depression Risk Factor Screening:     3 most recent PHQ Screens 4/1/2020   PHQ Not Done Active Diagnosis of Depression or Bipolar Disorder   Little interest or pleasure in doing things -   Feeling down, depressed, irritable, or hopeless -   Total Score PHQ 2 -       Alcohol Risk Screen   Do you average more than 1 drink per night or more than 7 drinks a week:  No    On any one occasion in the past three months have you have had more than 3 drinks containing alcohol:  No        Functional Ability and Level of Safety:   Hearing: Hearing is good. Activities of Daily Living: The home contains: no safety equipment. Patient does total self care     Ambulation: with no difficulty     Fall Risk:  Fall Risk Assessment, last 12 mths 4/1/2020   Able to walk? Yes   Fall in past 12 months? No     Abuse Screen:  Patient is not abused       Cognitive Screening   Has your family/caregiver stated any concerns about your memory: no     Cognitive Screening: Normal - MMSE (Mini Mental Status Exam)    Assessment/Plan   Education and counseling provided:  Are appropriate based on today's review and evaluation    Diagnoses and all orders for this visit:    1. Type 2 diabetes mellitus with diabetic nephropathy, without long-term current use of insulin ([address]    2. Essential hypertension, benign    3. Hypothyroidism due to acquired atrophy of thyroid    4. Mixed hyperlipidemia    5. Moderate episode of recurrent major depressive disorder ([address]    6. Post-menopausal  -     DEXA BONE DENSITY STUDY AXIAL;  Future        Health Maintenance Due     Health Maintenance Due   Topic Date Due    Eye Exam Retinal or Dilated  02/25/2017    GLAUCOMA SCREENING Q2Y  10/05/2019    Bone Densitometry (Dexa) Screening  10/05/2019    Medicare Yearly Exam  11/22/2020       Patient Care Team   Patient Care Team:  Juno James MD as PCP - [facility], Israel Dupont MD as PCP - [facility] Empaneled Provider  Castillo Duckworth MD as Physician (Obstetrics & Gynecology)  Akila Yeager MD (Breast Surgery)  Korin Starr as Pharmacist    History     Patient Active Problem List   Diagnosis Code    Essential hypertension, benign I10    Hypothyroidism E03.9    ADD (attention deficit disorder) F98.8    GERD (gastroesophageal reflux disease) K21.9    Postmenopausal Z78.0    PTSD (post-traumatic stress disorder) F43.10    Type 2 diabetes mellitus with diabetic nephropathy, without long-term current use of insulin ([facility] E11.21     Past Medical History:   Diagnosis Date    ADD (attention deficit disorder) 8/17/2009    Breast cancer ([facility] 2012    Left Breast    Depressive disorder, not elsewhere classified 8/17/2009    Essential hypertension, benign 8/17/2009    History of mammogram 02/17/2020    BI-RADS Category 3    Hypertension     Pap smear for cervical cancer screening 07/28/2016; 01/09/2020    negative, HPV negative; negative, HPV negative    Psychiatric disorder 2007    panic attacks/PTSD    PTSD (post-traumatic stress disorder)     Thyroid disease     Type 2 diabetes mellitus without complication ([facility] 7/47/7130    Unspecified hypothyroidism 8/17/2009      Past Surgical History:   Procedure Laterality Date    BREAST SURGERY PROCEDURE UNLISTED  January 2012    left breast biopsy    HX BREAST LUMPECTOMY Left 2012    HX GYN      c section    HX HEENT  1992    partial thyroidectomy    HX TONSILLECTOMY      HX UROLOGICAL  1992    Bladder suspension     Current Outpatient Medications   Medication Sig Dispense Refill    gabapentin (NEURONTIN) 300 mg capsule ONE IN MORNING, ONE IN AFTERNOON AND THREE AT NIGHT 150 Cap 0    divalproex ER (Depakote ER) 500 mg ER tablet Take 2 Tabs by mouth daily. 60 Tab 0    rOPINIRole (REQUIP) 4 mg tab TAB TAKE 1 TABLET BY MOUTH EVERY DAY 90 Tab 0    pantoprazole (PROTONIX) 40 mg tablet TAKE 1 TABLET BY MOUTH EVERY DAY 90 Tab 1    simvastatin (ZOCOR) 10 mg tablet TAKE 1 TABLET BY MOUTH EVERY DAY AT NIGHT 90 Tab 0    metFORMIN ER (GLUCOPHAGE XR) 500 mg tablet TAKE TWO TABLETS BY MOUTH EVERY MORNING AND 1 TABLET AT NIGHT 270 Tab 1    levothyroxine (SYNTHROID) 137 mcg tablet TAKE 1 TABLET BY MOUTH EVERY DAY 90 Tab 2    flash glucose sensor (FreeStyle [place] 14 Day Sensor) kit 1 Units by Other route Once every 2 weeks. APPLY 1 SENSOR EVERY 14 DAYS TO CHECK BLOOD SUGARS Dx:E11.9 2 Kit 3    losartan (COZAAR) 100 mg tablet TAKE 1 TABLET BY MOUTH EVERY DAY 90 Tab 1    meloxicam (MOBIC) 15 mg tablet TAKE 1 TABLET BY MOUTH EVERY DAY 90 Tab 0    dulaglutide (TRULICITY) 1.5 JJ/8.8 mL sub-q pen 0.5 mL by SubCUTAneous route every seven (7) days. 4 Syringe 5    lancets misc Use to test blood sugars once daily. Dx:E11.9 100 Each 3    clonazePAM (KLONOPIN) 1 mg tablet Take 1 Tab by mouth three (3) times daily.  flash glucose scanning reader (FREESTYLE [place] 14 DAY READER) misc Use to check blood sugars throughout the day 1 Each 0    ONETOUCH ULTRA BLUE TEST STRIP strip TEST SUGARS ONCE DAILY AS DIRECTED 50 Strip 0    traZODone (DESYREL) 100 mg tablet TAKE 3 TABLETS BY MOUTH AT BEDTIME 270 Tab 1    methylphenidate (RITALIN) 20 mg tablet Take 2 Tabs by mouth two (2) times a day. 120 Tab 0    DAILY MULTI-VITAMIN PO Take 1 Tab by mouth daily.       hydrOXYzine HCL (ATARAX) 10 mg tablet ONE TABLET UP TO THREE TIMES DAILY       No Known Allergies    Family History   Problem Relation Age of Onset    Diabetes Mother     Cancer Mother         breast    Stroke Mother     Breast Cancer Mother 57        80 second time    Hypertension Father     Elevated Lipids Father     Migraines Father     Dementia Father      Social History     Tobacco Use    Smoking status: Never Smoker    Smokeless tobacco: Never Used   Substance Use Topics    Alcohol use: No     Alcohol/week: 0.0 standard drinks

## 2020-12-04 NOTE — PROGRESS NOTES
HISTORY OF PRESENT ILLNESS  Tess Alvarez is a 76 y.o. female. HPI  Pt reports that she has unintentionally lost 10 lbs since last office visit (10/2020). She notes that she has decreased appetite. Pt's daughter reports that pt sometimes forgets to eat and she wants to look into seeing a nutritionist so pt can have a consistent diet. Diabetic Review of Systems - further diabetic ROS: no polyuria or polydipsia, no chest pain, dyspnea or TIA's, no numbness, tingling or pain in extremities. Other symptoms and concerns: Pt's last a1c was 6.1 on 10/15/20 with an estimated BG of 128. Pt's daughter notes that pt is eating less fast food. Hyperlipidemia:  Cardiovascular risk analysis - 76 y.o. female LDL goal is under 100. ROS: taking medications as instructed, no medication side effects noted, no TIA's, no chest pain on exertion, no dyspnea on exertion, no swelling of ankles. Tolerating meds, no myalgias or other side effects noted. New concerns: Pt's last LDL was 94.6 on 10/15/20. Hypertension ROS: taking medications as instructed, no medication side effects noted, no TIA's, no chest pain on exertion, no dyspnea on exertion, no swelling of ankles. New concerns: BP in office today is 142/84. Pt reports that she has been complying with Sudafed regularly to help with congestion. Stable, pt continues to comply with Ritalin 40 mg BID. Pt sees a psychiatric NP at [facility]. Pt DC Seroquil.     hypothyroidism. Lab Results   Component Value Date/Time    TSH 0.96 04/01/2020 11:35 AM     Thyroid ROS: denies fatigue, heat/cold intolerance, bowel/skin changes or CVS symptoms. Review of Systems   Constitutional: Positive for weight loss. All other systems reviewed and are negative. Physical Exam  Vitals signs and nursing note reviewed. Constitutional:       Appearance: Normal appearance. She is normal weight. HENT:      Head: Normocephalic and atraumatic.       Right Ear: Tympanic membrane, ear canal and external ear normal.      Left Ear: Tympanic membrane, ear canal and external ear normal.      Nose: Nose normal.      Mouth/Throat:      Mouth: Mucous membranes are dry. Pharynx: Oropharynx is clear. Neck:      Musculoskeletal: Normal range of motion and neck supple. Cardiovascular:      Rate and Rhythm: Normal rate and regular rhythm. Pulses: Normal pulses. Heart sounds: Normal heart sounds. Pulmonary:      Effort: Pulmonary effort is normal.      Breath sounds: Normal breath sounds. Abdominal:      General: Abdomen is flat. Palpations: Abdomen is soft. Musculoskeletal: Normal range of motion. Skin:     General: Skin is warm and dry. Neurological:      General: No focal deficit present. Mental Status: She is alert and oriented to person, place, and time. Mental status is at baseline. Psychiatric:         Mood and Affect: Mood normal.         Behavior: Behavior normal.         Thought Content: Thought content normal.         Judgment: Judgment normal.         ASSESSMENT and PLAN  Diagnoses and all orders for this visit:    1. Type 2 diabetes mellitus with diabetic nephropathy, without long-term current use of insulin (HCC)  Sugars stable. Continue to follow diabetic diet and monitor sugars. Directed pt to DC Trulicity since it instigates weight loss. Will continue to monitor for improvements or changes. 2. Essential hypertension, benign  Pt's BP is elevated in office today. Discussed with pt that she cannot comply with Sudafed regularly since it is causing her BP to be elevated. Advised pt to comply with Claritin or Zyrtec instead. Directed pt to continue on current BP medication regimen. Will continue to monitor for improvements or changes. 3. Hypothyroidism due to acquired atrophy of thyroid  Discussed with pt that thyroid can also affect weight loss. Thyroid presumed stable. Rechecking labs to confirm. Pt tolerating medication.  I do not recommend a change in treatment plan. 4. Mixed hyperlipidemia  Stable, patient is tolerating medications, no myalgias. I do not recommend any change in medications. 5. Moderate episode of recurrent major depressive disorder (HCC)  Pt presents with unintentional weight loss which I suspect is due to the medications she is complying with. Advised pt to discuss dose of Ritalin with psychiatrist since she is complying with 40 mg BID which can affect appetite. Had conversation with pt that Seroquil causes increased appetite and DC it could have stunted her appetite. Will continue to monitor for improvements or changes. 6. Post-menopausal  Ordered DEXA and waiting on results. -     DEXA BONE DENSITY STUDY AXIAL; Future    Lab results and schedule of future lab studies reviewed with patient. Reviewed diet, exercise and weight control. Written by Ruth Puente, as dictated by Abdelrahman Faustin MD.     Current diagnosis and concerns discussed with pt at length. Understands risks and benefits or current treatment plan and medications and accepts the treatment and medication with any possible risks. Pt asks appropriate questions which were answered. Pt instructed to call with any concerns or problems.

## 2020-12-04 NOTE — PATIENT INSTRUCTIONS
Medicare Wellness Visit, Female     The best way to live healthy is to have a lifestyle where you eat a well-balanced diet, exercise regularly, limit alcohol use, and quit all forms of tobacco/nicotine, if applicable. Regular preventive services are another way to keep healthy. Preventive services (vaccines, screening tests, monitoring & exams) can help personalize your care plan, which helps you manage your own care. Screening tests can find health problems at the earliest stages, when they are easiest to treat. [facility] follows the current, evidence-based guidelines published by the [facility] ([facility]) when recommending preventive services for our patients. Because we follow these guidelines, sometimes recommendations change over time as research supports it. (For example, mammograms used to be recommended annually. Even though Medicare will still pay for an annual mammogram, the newer guidelines recommend a mammogram every two years for women of average risk). Of course, you and your doctor may decide to screen more often for some diseases, based on your risk and your co-morbidities (chronic disease you are already diagnosed with). Preventive services for you include:  - Medicare offers their members a free annual wellness visit, which is time for you and your primary care provider to discuss and plan for your preventive service needs. Take advantage of this benefit every year!  -All adults over the age of 72 should receive the recommended pneumonia vaccines. Current USPSTF guidelines recommend a series of two vaccines for the best pneumonia protection.   -All adults should have a flu vaccine yearly and a tetanus vaccine every 10 years.   -All adults age 48 and older should receive the shingles vaccines (series of two vaccines).       -All adults age 38-68 who are overweight should have a diabetes screening test once every three years.   -All adults born between [birth year] and 1965 should be screened once for Hepatitis C.  -Other screening tests and preventive services for persons with diabetes include: an eye exam to screen for diabetic retinopathy, a kidney function test, a foot exam, and stricter control over your cholesterol.   -Cardiovascular screening for adults with routine risk involves an electrocardiogram (ECG) at intervals determined by your doctor.   -Colorectal cancer screenings should be done for adults age 54-65 with no increased risk factors for colorectal cancer. There are a number of acceptable methods of screening for this type of cancer. Each test has its own benefits and drawbacks. Discuss with your doctor what is most appropriate for you during your annual wellness visit. The different tests include: colonoscopy (considered the best screening method), a fecal occult blood test, a fecal DNA test, and sigmoidoscopy.    -A bone mass density test is recommended when a woman turns 65 to screen for osteoporosis. This test is only recommended one time, as a screening. Some providers will use this same test as a disease monitoring tool if you already have osteoporosis. -Breast cancer screenings are recommended every other year for women of normal risk, age 54-69.  -Cervical cancer screenings for women over age 72 are only recommended with certain risk factors.      Here is a list of your current Health Maintenance items (your personalized list of preventive services) with a due date:  Health Maintenance Due   Topic Date Due    Eye Exam  02/25/2017    Glaucoma Screening   10/05/2019    Bone Mineral Density   10/05/2019    Annual Well Visit  11/22/2020

## 2020-12-15 RX ORDER — MELOXICAM 15 MG/1
TABLET ORAL
Qty: 90 TAB | Refills: 0 | Status: SHIPPED | OUTPATIENT
Start: 2020-12-15 | End: 2021-04-13

## 2020-12-17 ENCOUNTER — TELEPHONE (OUTPATIENT)
Dept: INTERNAL MEDICINE CLINIC | Age: 65
End: 2020-12-17

## 2020-12-17 RX ORDER — DIVALPROEX SODIUM 500 MG/1
1000 TABLET, EXTENDED RELEASE ORAL DAILY
Qty: 60 TAB | Refills: 0 | Status: SHIPPED | OUTPATIENT
Start: 2020-12-17 | End: 2020-12-17

## 2020-12-17 RX ORDER — DIVALPROEX SODIUM 500 MG/1
TABLET, EXTENDED RELEASE ORAL
Qty: 60 TAB | Refills: 0 | Status: SHIPPED | OUTPATIENT
Start: 2020-12-17 | End: 2021-01-10

## 2020-12-17 NOTE — TELEPHONE ENCOUNTER
----- Message from Oli James sent at 12/16/2020  6:54 PM EST -----  Regarding: Dr. Leila Noriega (if not patient): [facility]      Relationship of caller (if not patient):  Self      Best contact number(s):  [phone]      Name of medication and dosage if known:  Divalproex, 60 pills for a 30-day supply      Is patient out of this medication (yes/no): Yes      Pharmacy name:  [address] listed in chart? (yes/no):  Pharmacy phone number:  [phone]      Details to clarify the request:  Pt is requesting a refill of her medication to be sent to the [facility].     Thanks,  Oli James

## 2020-12-18 DIAGNOSIS — G25.81 RESTLESS LEG: Primary | ICD-10-CM

## 2020-12-18 RX ORDER — CLONAZEPAM 1 MG/1
1 TABLET ORAL 3 TIMES DAILY
Qty: 90 TAB | Refills: 0 | Status: SHIPPED | OUTPATIENT
Start: 2020-12-18 | End: 2021-01-27

## 2020-12-18 NOTE — TELEPHONE ENCOUNTER
----- Message from [address] [place] sent at 12/17/2020  4:43 PM EST -----  Regarding: Dr. Rosen Grade (if not patient): N/A      Relationship of caller (if not patient): N/A      Best contact number(s): [phone]      Name of medication and dosage if known: clonazePAM (KLONOPIN) 1 mg      Is patient out of this medication (yes/no): No, 3 left      Pharmacy name: [facility] on [address] [id number] listed in chart? (yes/no): Yes  Pharmacy phone number:      Details to clarify the request:Pt requesting 30 day rx refill for clonazePAM (KLONOPIN) 1 mg to be sent to [facility] pharmacy. Pt stated this would be a temporary fill until able to establish care with new psychiatric provider.      Katina Cr Done

## 2020-12-24 ENCOUNTER — TELEPHONE (OUTPATIENT)
Dept: INTERNAL MEDICINE CLINIC | Age: 65
End: 2020-12-24

## 2020-12-24 DIAGNOSIS — G25.81 RESTLESS LEG: ICD-10-CM

## 2020-12-24 RX ORDER — CLONAZEPAM 1 MG/1
1 TABLET ORAL 3 TIMES DAILY
Qty: 90 TAB | Refills: 0 | OUTPATIENT
Start: 2020-12-24

## 2020-12-24 NOTE — TELEPHONE ENCOUNTER
----- Message from Marni Sharma sent at 12/23/2020  7:02 PM EST -----  Regarding: Dr. Britney Rollins (if not patient): [facility]      Relationship of caller (if not patient):  Self      Best contact number(s):  [phone]      Name of medication and dosage if known:  Clonazepam 1 mg tab, 3 x per day      Is patient out of this medication (yes/no):      Pharmacy name:  [address] listed in chart? (yes/no):  Pharmacy phone number:  [phone]      Details to clarify the request:  Pt states she wont't have enough of her medication to last through the weekend and there's a possibility she could have seizures if she's off for too long. Pt is requesting a refill as soon as possible.     Thanks,  Marni Sharma

## 2020-12-31 DIAGNOSIS — G62.9 NEUROPATHY: ICD-10-CM

## 2020-12-31 DIAGNOSIS — F33.1 MODERATE EPISODE OF RECURRENT MAJOR DEPRESSIVE DISORDER (HCC): ICD-10-CM

## 2020-12-31 DIAGNOSIS — G25.81 RESTLESS LEG SYNDROME: ICD-10-CM

## 2021-01-02 RX ORDER — GABAPENTIN 300 MG/1
CAPSULE ORAL
Qty: 150 CAP | Refills: 0 | Status: SHIPPED | OUTPATIENT
Start: 2021-01-02 | End: 2021-02-08

## 2021-01-11 RX ORDER — ROPINIROLE 4 MG/1
TABLET, FILM COATED ORAL
Qty: 90 TAB | Refills: 0 | Status: SHIPPED | OUTPATIENT
Start: 2021-01-11 | End: 2021-04-08

## 2021-01-14 DIAGNOSIS — I10 ESSENTIAL HYPERTENSION, BENIGN: Chronic | ICD-10-CM

## 2021-01-14 RX ORDER — LOSARTAN POTASSIUM 100 MG/1
TABLET ORAL
Qty: 90 TAB | Refills: 1 | Status: SHIPPED | OUTPATIENT
Start: 2021-01-14 | End: 2021-07-10

## 2021-01-20 ENCOUNTER — TELEPHONE (OUTPATIENT)
Dept: INTERNAL MEDICINE CLINIC | Age: 66
End: 2021-01-20

## 2021-01-20 NOTE — TELEPHONE ENCOUNTER
----- Message from Kevin Osborn sent at 1/20/2021  2:59 PM EST -----  Regarding: Dr. Shaheed Dee: [phone]  General Message/Vendor Calls    Caller's first and last name: Pt.       Reason for call: Needs to discuss personal matter with Dr. Stephenie Dumont. Callback required yes/no and why: Yes, from Dr. Stephenie Dumont. Best contact number(s): [phone]      Details to clarify the request: Pt needs to discuss personal information with Dr. Stephenie Dumont before Friday 1/22/2021 at 10:00 am. Would like a call back as soon as possible.        Kevin Osborn

## 2021-01-26 ENCOUNTER — PATIENT MESSAGE (OUTPATIENT)
Dept: INTERNAL MEDICINE CLINIC | Age: 66
End: 2021-01-26

## 2021-01-27 DIAGNOSIS — G25.81 RESTLESS LEG: ICD-10-CM

## 2021-01-27 RX ORDER — CLONAZEPAM 1 MG/1
1 TABLET ORAL 3 TIMES DAILY
Qty: 90 TAB | Refills: 0 | Status: SHIPPED | OUTPATIENT
Start: 2021-01-27 | End: 2021-02-27

## 2021-02-09 ENCOUNTER — OFFICE VISIT (OUTPATIENT)
Dept: INTERNAL MEDICINE CLINIC | Age: 66
End: 2021-02-09
Payer: MEDICARE

## 2021-02-09 VITALS
OXYGEN SATURATION: 97 % | SYSTOLIC BLOOD PRESSURE: 130 MMHG | TEMPERATURE: 98 F | WEIGHT: 150.6 LBS | DIASTOLIC BLOOD PRESSURE: 81 MMHG | HEART RATE: 74 BPM | HEIGHT: 64 IN | BODY MASS INDEX: 25.71 KG/M2 | RESPIRATION RATE: 18 BRPM

## 2021-02-09 DIAGNOSIS — E03.4 HYPOTHYROIDISM DUE TO ACQUIRED ATROPHY OF THYROID: ICD-10-CM

## 2021-02-09 DIAGNOSIS — J45.20 MILD INTERMITTENT REACTIVE AIRWAY DISEASE WITHOUT COMPLICATION: ICD-10-CM

## 2021-02-09 DIAGNOSIS — G25.81 RESTLESS LEG SYNDROME: ICD-10-CM

## 2021-02-09 DIAGNOSIS — F31.81 BIPOLAR 2 DISORDER (HCC): ICD-10-CM

## 2021-02-09 DIAGNOSIS — F98.8 ATTENTION DEFICIT DISORDER, UNSPECIFIED HYPERACTIVITY PRESENCE: Primary | ICD-10-CM

## 2021-02-09 DIAGNOSIS — E78.2 MIXED HYPERLIPIDEMIA: ICD-10-CM

## 2021-02-09 DIAGNOSIS — G62.9 NEUROPATHY: ICD-10-CM

## 2021-02-09 DIAGNOSIS — E11.21 TYPE 2 DIABETES MELLITUS WITH DIABETIC NEPHROPATHY, WITHOUT LONG-TERM CURRENT USE OF INSULIN (HCC): ICD-10-CM

## 2021-02-09 DIAGNOSIS — Z12.31 VISIT FOR SCREENING MAMMOGRAM: ICD-10-CM

## 2021-02-09 DIAGNOSIS — I10 ESSENTIAL HYPERTENSION, BENIGN: ICD-10-CM

## 2021-02-09 LAB
ALBUMIN SERPL-MCNC: 4.1 G/DL (ref 3.5–5)
ALBUMIN/GLOB SERPL: 1.1 {RATIO} (ref 1.1–2.2)
ALP SERPL-CCNC: 107 U/L (ref 45–117)
ALT SERPL-CCNC: 27 U/L (ref 12–78)
ANION GAP SERPL CALC-SCNC: 9 MMOL/L (ref 5–15)
AST SERPL-CCNC: 16 U/L (ref 15–37)
BILIRUB SERPL-MCNC: 0.3 MG/DL (ref 0.2–1)
BUN SERPL-MCNC: 23 MG/DL (ref 6–20)
BUN/CREAT SERPL: 28 (ref 12–20)
CALCIUM SERPL-MCNC: 9.7 MG/DL (ref 8.5–10.1)
CHLORIDE SERPL-SCNC: 102 MMOL/L (ref 97–108)
CHOLEST SERPL-MCNC: 237 MG/DL
CO2 SERPL-SCNC: 23 MMOL/L (ref 21–32)
CREAT SERPL-MCNC: 0.81 MG/DL (ref 0.55–1.02)
EST. AVERAGE GLUCOSE BLD GHB EST-MCNC: 140 MG/DL
GLOBULIN SER CALC-MCNC: 3.6 G/DL (ref 2–4)
GLUCOSE SERPL-MCNC: 152 MG/DL (ref 65–100)
HBA1C MFR BLD: 6.5 % (ref 4–5.6)
HDLC SERPL-MCNC: 82 MG/DL
HDLC SERPL: 2.9 {RATIO} (ref 0–5)
LDLC SERPL CALC-MCNC: 124.2 MG/DL (ref 0–100)
LIPID PROFILE,FLP: ABNORMAL
POTASSIUM SERPL-SCNC: 4.4 MMOL/L (ref 3.5–5.1)
PROT SERPL-MCNC: 7.7 G/DL (ref 6.4–8.2)
SODIUM SERPL-SCNC: 134 MMOL/L (ref 136–145)
TRIGL SERPL-MCNC: 154 MG/DL (ref ?–150)
VLDLC SERPL CALC-MCNC: 30.8 MG/DL

## 2021-02-09 PROCEDURE — 2022F DILAT RTA XM EVC RTNOPTHY: CPT | Performed by: INTERNAL MEDICINE

## 2021-02-09 PROCEDURE — G8419 CALC BMI OUT NRM PARAM NOF/U: HCPCS | Performed by: INTERNAL MEDICINE

## 2021-02-09 PROCEDURE — G8754 DIAS BP LESS 90: HCPCS | Performed by: INTERNAL MEDICINE

## 2021-02-09 PROCEDURE — G8752 SYS BP LESS 140: HCPCS | Performed by: INTERNAL MEDICINE

## 2021-02-09 PROCEDURE — 1101F PT FALLS ASSESS-DOCD LE1/YR: CPT | Performed by: INTERNAL MEDICINE

## 2021-02-09 PROCEDURE — G8432 DEP SCR NOT DOC, RNG: HCPCS | Performed by: INTERNAL MEDICINE

## 2021-02-09 PROCEDURE — G8400 PT W/DXA NO RESULTS DOC: HCPCS | Performed by: INTERNAL MEDICINE

## 2021-02-09 PROCEDURE — G9899 SCRN MAM PERF RSLTS DOC: HCPCS | Performed by: INTERNAL MEDICINE

## 2021-02-09 PROCEDURE — G8427 DOCREV CUR MEDS BY ELIG CLIN: HCPCS | Performed by: INTERNAL MEDICINE

## 2021-02-09 PROCEDURE — 1090F PRES/ABSN URINE INCON ASSESS: CPT | Performed by: INTERNAL MEDICINE

## 2021-02-09 PROCEDURE — G8536 NO DOC ELDER MAL SCRN: HCPCS | Performed by: INTERNAL MEDICINE

## 2021-02-09 PROCEDURE — G0463 HOSPITAL OUTPT CLINIC VISIT: HCPCS | Performed by: INTERNAL MEDICINE

## 2021-02-09 PROCEDURE — 3017F COLORECTAL CA SCREEN DOC REV: CPT | Performed by: INTERNAL MEDICINE

## 2021-02-09 PROCEDURE — 3046F HEMOGLOBIN A1C LEVEL >9.0%: CPT | Performed by: INTERNAL MEDICINE

## 2021-02-09 PROCEDURE — 99214 OFFICE O/P EST MOD 30 MIN: CPT | Performed by: INTERNAL MEDICINE

## 2021-02-09 RX ORDER — ALBUTEROL SULFATE 90 UG/1
2 AEROSOL, METERED RESPIRATORY (INHALATION)
Qty: 1 INHALER | Refills: 1 | Status: SHIPPED | OUTPATIENT
Start: 2021-02-09 | End: 2022-04-18 | Stop reason: ALTCHOICE

## 2021-02-09 RX ORDER — LAMOTRIGINE 200 MG/1
200 TABLET ORAL DAILY
COMMUNITY
Start: 2021-01-30 | End: 2021-10-07 | Stop reason: SDUPTHER

## 2021-02-09 NOTE — PROGRESS NOTES
Phil Fraser (: 1955) is a 76 y.o. female, established patient, here for evaluation of the following chief complaint(s):  Follow-up       ASSESSMENT/PLAN:  1. Attention deficit disorder, unspecified hyperactivity presence  Not at goal. Discussed with pt that I cannot Rx Ritalin if her psychiatric NP took her off of it. Had conversation with pt that she needs to have conversation with NP or psychiatrist if she wants to start on other ADHD meds. Informed pt that I only refill meds once they have been Rx by psychiatrist. Will continue to monitor for improvements or changes. 2. Restless leg syndrome  Stable and well-managed. No change in medications. 3. Type 2 diabetes mellitus with diabetic nephropathy, without long-term current use of insulin ([facility]  Sugars presumed stable, will recheck today. Continue to follow diabetic diet and monitor sugars.   -     HEMOGLOBIN A1C WITH EAG; Future    4. Essential hypertension, benign  BP is at goal. I do not recommend any change in medications.   -     METABOLIC PANEL, COMPREHENSIVE; Future    5. Hypothyroidism due to acquired atrophy of thyroid  Thyroid stable. I do not recommend a change in medications. 6. Mixed hyperlipidemia  Presumed stable, will recheck labs today. Patient is tolerating medications with no myalgias. I do not recommend any change in treatment plan. -     LIPID PANEL; Future    7. Bipolar 2 disorder ([facility]  Followed by psychiatric NP. Stable and well-managed. No change in medications. 8. Neuropathy  Not at goal. Explained that following at the neuropathy clinic may provide some relief. Refilled Gabapentin. Will continue to monitor for improvements or changes. 9. Visit for screening mammogram  Reminded pt that she is due for her mammogram. Waiting on scan results. -     [facility] MAMMO BI SCREENING INCL CAD; Future    10. Mild intermittent reactive airway disease without complication  Stable and well-managed. No change in medications. -     albuterol (PROVENTIL HFA, VENTOLIN HFA, PROAIR HFA) 90 mcg/actuation inhaler; Take 2 Puffs by inhalation every four (4) hours as needed for Wheezing., Normal, Disp-1 Inhaler, R-1      SUBJECTIVE/OBJECTIVE:  HPI  Pt reports that psychiatric NP took her off of Depakote and started her on Lamictal 25 mg for 14 days and 50 mg after that. Pt reports that she is having persistent HA and fatigue. She notes that she is drinking more coffee (6 cups per day) which is keeping her up at night. Sleep: Pt reports that she is now complying with Trazodone 200 mg. ADHD: Pt reports that psychiatric NP is reevaluating pt's ritalin compliance. She notes that NP may start her on concerta. She states that there may be some issues with NP. Neuropathy: Pt reports that she is going to start going to neuropathy clinic. She notes that she is continuing on Gabapentin and Requip with some relief. Diabetic Review of Systems - further diabetic ROS: no polyuria or polydipsia, no chest pain, dyspnea or TIA's, no numbness, tingling or pain in extremities. Other symptoms and concerns: Pt's last a1c was 6.1 on 10/15/20 with an estimated BG of 128. Pt reports that her BG has been fairly stable off of Trulicity. Review of Systems   All other systems reviewed and are negative. Physical Exam  Constitutional:       Appearance: Normal appearance. HENT:      Right Ear: Tympanic membrane and external ear normal.      Left Ear: Tympanic membrane and external ear normal.      Mouth/Throat:      Mouth: Mucous membranes are moist.      Pharynx: Oropharynx is clear. Cardiovascular:      Rate and Rhythm: Normal rate and regular rhythm. Pulses: Normal pulses. Heart sounds: Normal heart sounds. Pulmonary:      Effort: Pulmonary effort is normal.      Breath sounds: Normal breath sounds. Musculoskeletal: Normal range of motion. Skin:     General: Skin is warm and dry.    Neurological:      General: No focal deficit present. Mental Status: She is alert and oriented to person, place, and time. Psychiatric:         Mood and Affect: Mood normal.         Behavior: Behavior normal.               An electronic signature was used to authenticate this note.   -- Saritha Carcamo

## 2021-02-11 ENCOUNTER — TELEPHONE (OUTPATIENT)
Dept: INTERNAL MEDICINE CLINIC | Age: 66
End: 2021-02-11

## 2021-02-11 NOTE — TELEPHONE ENCOUNTER
----- Message from Ioana Dyer sent at 2/11/2021  2:23 PM EST -----  Regarding: telephone  General Message/Vendor Calls    Caller's first and last name: Bry Seth       Reason for call: PT wants to speak with the doctor about suggestions for nerophathy       Callback required yes/no and why: yes to make sure they received the message       Best contact number(s): [phone]      Details to clarify the request: n/a       Ioana Dyer

## 2021-02-11 NOTE — TELEPHONE ENCOUNTER
I have no other medicine- but she can talk to a neurologist if she would like, there is not a great medicine to take care of it

## 2021-02-11 NOTE — TELEPHONE ENCOUNTER
----- Message from Karyna Aly sent at 2/11/2021  2:37 PM EST -----  Regarding: /telephone  Contact: [phone]  General Message/Vendor Calls    Caller's first and last name: Juno Or Daughter      Reason for call: Any further recommendations regarding neuropathy pain. The current medication does not seem to be helping.        Callback required yes/no and why: Yes      Best contact number(s): [phone]      Details to clarify the request:N/A      Karyna Aly

## 2021-02-15 NOTE — TELEPHONE ENCOUNTER
Spoke with patient's daughter and advised her that Dr. Larry Montenegro recommended she see a neurologist if she is still having a lot of neuropathy pain. Pt's daughter understood and was thankful for the call.

## 2021-02-26 DIAGNOSIS — G25.81 RESTLESS LEG: ICD-10-CM

## 2021-02-27 RX ORDER — FLASH GLUCOSE SENSOR
KIT MISCELLANEOUS
Qty: 2 KIT | Refills: 3 | Status: SHIPPED | OUTPATIENT
Start: 2021-02-27 | End: 2022-08-22

## 2021-02-27 RX ORDER — CLONAZEPAM 1 MG/1
1 TABLET ORAL 3 TIMES DAILY
Qty: 90 TAB | Refills: 0 | Status: SHIPPED | OUTPATIENT
Start: 2021-02-27 | End: 2021-10-04

## 2021-03-02 DIAGNOSIS — E11.9 TYPE 2 DIABETES MELLITUS WITHOUT COMPLICATION, WITHOUT LONG-TERM CURRENT USE OF INSULIN (HCC): ICD-10-CM

## 2021-03-02 RX ORDER — METFORMIN HYDROCHLORIDE 500 MG/1
TABLET, EXTENDED RELEASE ORAL
Qty: 270 TAB | Refills: 1 | Status: SHIPPED | OUTPATIENT
Start: 2021-03-02 | End: 2021-07-06

## 2021-03-17 ENCOUNTER — OFFICE VISIT (OUTPATIENT)
Dept: NEUROLOGY | Age: 66
End: 2021-03-17
Payer: MEDICARE

## 2021-03-17 VITALS
DIASTOLIC BLOOD PRESSURE: 70 MMHG | TEMPERATURE: 97.5 F | RESPIRATION RATE: 18 BRPM | BODY MASS INDEX: 26.8 KG/M2 | SYSTOLIC BLOOD PRESSURE: 126 MMHG | WEIGHT: 157 LBS | HEIGHT: 64 IN | OXYGEN SATURATION: 97 % | HEART RATE: 88 BPM

## 2021-03-17 DIAGNOSIS — E11.42 DIABETIC POLYNEUROPATHY ASSOCIATED WITH TYPE 2 DIABETES MELLITUS (HCC): Primary | ICD-10-CM

## 2021-03-17 PROCEDURE — G8432 DEP SCR NOT DOC, RNG: HCPCS | Performed by: PSYCHIATRY & NEUROLOGY

## 2021-03-17 PROCEDURE — 1101F PT FALLS ASSESS-DOCD LE1/YR: CPT | Performed by: PSYCHIATRY & NEUROLOGY

## 2021-03-17 PROCEDURE — 1090F PRES/ABSN URINE INCON ASSESS: CPT | Performed by: PSYCHIATRY & NEUROLOGY

## 2021-03-17 PROCEDURE — G8752 SYS BP LESS 140: HCPCS | Performed by: PSYCHIATRY & NEUROLOGY

## 2021-03-17 PROCEDURE — 3017F COLORECTAL CA SCREEN DOC REV: CPT | Performed by: PSYCHIATRY & NEUROLOGY

## 2021-03-17 PROCEDURE — 3044F HG A1C LEVEL LT 7.0%: CPT | Performed by: PSYCHIATRY & NEUROLOGY

## 2021-03-17 PROCEDURE — G8754 DIAS BP LESS 90: HCPCS | Performed by: PSYCHIATRY & NEUROLOGY

## 2021-03-17 PROCEDURE — G8427 DOCREV CUR MEDS BY ELIG CLIN: HCPCS | Performed by: PSYCHIATRY & NEUROLOGY

## 2021-03-17 PROCEDURE — 99204 OFFICE O/P NEW MOD 45 MIN: CPT | Performed by: PSYCHIATRY & NEUROLOGY

## 2021-03-17 PROCEDURE — G9899 SCRN MAM PERF RSLTS DOC: HCPCS | Performed by: PSYCHIATRY & NEUROLOGY

## 2021-03-17 PROCEDURE — G8400 PT W/DXA NO RESULTS DOC: HCPCS | Performed by: PSYCHIATRY & NEUROLOGY

## 2021-03-17 PROCEDURE — G8419 CALC BMI OUT NRM PARAM NOF/U: HCPCS | Performed by: PSYCHIATRY & NEUROLOGY

## 2021-03-17 PROCEDURE — 2022F DILAT RTA XM EVC RTNOPTHY: CPT | Performed by: PSYCHIATRY & NEUROLOGY

## 2021-03-17 PROCEDURE — G8536 NO DOC ELDER MAL SCRN: HCPCS | Performed by: PSYCHIATRY & NEUROLOGY

## 2021-03-17 RX ORDER — LIDOCAINE AND PRILOCAINE 25; 25 MG/G; MG/G
CREAM TOPICAL AS NEEDED
Qty: 30 G | Refills: 3 | Status: SHIPPED | OUTPATIENT
Start: 2021-03-17 | End: 2021-05-17 | Stop reason: SDUPTHER

## 2021-03-17 RX ORDER — ATOMOXETINE 40 MG/1
CAPSULE ORAL
COMMUNITY
Start: 2021-02-20 | End: 2021-04-19

## 2021-03-17 RX ORDER — PREGABALIN 150 MG/1
150 CAPSULE ORAL 3 TIMES DAILY
Qty: 90 CAP | Refills: 3 | Status: SHIPPED | OUTPATIENT
Start: 2021-03-17 | End: 2021-06-22 | Stop reason: ALTCHOICE

## 2021-03-17 NOTE — PATIENT INSTRUCTIONS
RESULT POLICY If we have ordered testing for you, know that; \"NO NEWS IS GOOD NEWS! \" It is our policy that we no longer call patients with results, nor do we  give test results over the phone. We schedule follow up appointments so that your results can be discussed in person. This allows you to address any questions you have regarding the results. If you choose to go to an imaging center outside of [facility], it is your responsibility to bring imaging report and disc to follow up appointment. If something of concern is revealed on your test, we will contact you to discuss the matter and if needed schedule a sooner follow up appointment. Additionally, results may be found by using the My Chart feature and one of our patient service representatives at the  can give you instructions on how to access this feature to utilize our electronic medical record system. Thank you for your understanding. PRESCRIPTION REFILL POLICY [facility] Statement to Patients April 1, 2014 In an effort to ensure the large volume of patient prescription refills is processed in the most efficient and expeditious manner, we are asking our patients to assist us by calling your Pharmacy for all prescription refills, this will include also your  Mail Order Pharmacy. The pharmacy will contact our office electronically to continue the refill process. Please do not wait until the last minute to call your pharmacy. We need at least 48 hours (2days) to fill prescriptions. We also encourage you to call your pharmacy before going to  your prescription to make sure it is ready. With regard to controlled substance prescription refill requests (narcotic refills) that need to be picked up at our office, we ask your cooperation by providing us with at least 72 hours (3days) notice that you will need a refill.  
 
We will not refill narcotic prescription refill requests after 4:00pm on any weekday, Monday through Thursday, or after 2:00pm on Fridays, or on the weekends. We encourage everyone to explore another way of getting your prescription refill request processed using [employer], our patient web portal through our electronic medical record system. [employer] is an efficient and effective way to communicate your medication request directly to the office and  downloadable as an roldan on your smart phone . [employer] also features a review functionality that allows you to view your medication list as well as leave messages for your physician. Are you ready to get connected? If so please review the attatched instructions or speak to any of our staff to get you set up right away! Thank you so much for your cooperation. Should you have any questions please contact our Practice Administrator.

## 2021-03-17 NOTE — PROGRESS NOTES
[facility] and [address] at [facility] at [address] [facility], [address] [facility] [address]  [phone] Office  [phone] Facsimile           Referring: Olivier Art MD      Chief Complaint   Patient presents with    New Patient    Numbness     and tingling in toes. mainly at night when lying in bed.  lidocaine patches help     71-year-old right-handed lady presents for initial neurologic consultation regarding what she calls total neuropathy. She says over the last year to year and a half she started to have tingling in her toes and the dorsum of the foot. Feels like numbness and tingling and burning pins-and-needles type sensation. During the day symptoms are fairly tolerable but at night they are intolerable. She has been on Neurontin for the last year and a half or so 300/300/900. It has not helped. She is taping lidocaine patches on her feet at night. Diabetes has been diagnosed about 3 years or so ago. Hemoglobin A1c's have been doing well. Mother also had diabetic neuropathy. Record review finds office visit with Dr. William Grider dated February 9, 2021 where she was following up for attention deficit restless leg diabetes hypertension hypothyroidism dyslipidemia bipolar and neuropathy. She was on Neurontin at that time. She was referred to neurology. Laboratory analyses from that date with largely unremarkable metabolic panel except elevated glucose  Lipid panel total cholesterol 237   Hemoglobin A1c 6.5  TSH essentially normal.  Free T4 just a touch high  Hemoglobin A1c from October 2020 6.1  Hemoglobin A1c from April 2026. 7  Past Medical History:   Diagnosis Date    ADD (attention deficit disorder) 8/17/2009    Breast cancer ([facility] 2012    Left Breast    Depressive disorder, not elsewhere classified 8/17/2009    Essential hypertension, benign 8/17/2009    History of mammogram 02/17/2020    BI-RADS Category 3    Hypertension     Pap smear for cervical cancer screening 07/28/2016; 01/09/2020    negative, HPV negative; negative, HPV negative    Psychiatric disorder 2007    panic attacks/PTSD    PTSD (post-traumatic stress disorder)     Thyroid disease     Type 2 diabetes mellitus without complication ([facility] 3/43/8555    Unspecified hypothyroidism 8/17/2009       Past Surgical History:   Procedure Laterality Date    HX BREAST LUMPECTOMY Left 2012    HX GYN      c section    HX HEENT  1992    partial thyroidectomy    HX TONSILLECTOMY      HX UROLOGICAL  1992    Bladder suspension    ND BREAST SURGERY PROCEDURE UNLISTED  January 2012    left breast biopsy       Current Outpatient Medications   Medication Sig Dispense Refill    metFORMIN ER (GLUCOPHAGE XR) 500 mg tablet TAKE TWO TABLETS BY MOUTH EVERY MORNING AND 1 TABLET AT NIGHT 270 Tab 1    clonazePAM (KlonoPIN) 1 mg tablet TAKE 1 TAB BY MOUTH THREE (3) TIMES DAILY. MAX DAILY AMOUNT: 3 MG. 90 Tab 0    FreeStyle Yaquelin 14 Day Sensor kit APPLY 1 SENSOR EVERY 14 DAYS TO CHECK BLOOD SUGARS DX:E11.9 2 Kit 3    lamoTRIgine (LaMICtal) 25 mg tablet Take 25 mg by mouth daily.  albuterol (PROVENTIL HFA, VENTOLIN HFA, PROAIR HFA) 90 mcg/actuation inhaler Take 2 Puffs by inhalation every four (4) hours as needed for Wheezing.  1 Inhaler 1    gabapentin (NEURONTIN) 300 mg capsule TAKE 1 CAPSULE BY MOUTH EVERY MORNING , TAKE 1 CAPSULE IN THE AFTERNOON , AND TAKE 3 CAPSULES AT NIGHT 150 Cap 4    losartan (COZAAR) 100 mg tablet TAKE 1 TABLET BY MOUTH EVERY DAY 90 Tab 1    rOPINIRole (REQUIP) 4 mg tab TAB TAKE 1 TABLET BY MOUTH EVERY DAY 90 Tab 0    meloxicam (MOBIC) 15 mg tablet TAKE 1 TABLET BY MOUTH EVERY DAY 90 Tab 0    pantoprazole (PROTONIX) 40 mg tablet TAKE 1 TABLET BY MOUTH EVERY DAY 90 Tab 1    simvastatin (ZOCOR) 10 mg tablet TAKE 1 TABLET BY MOUTH EVERY DAY AT NIGHT 90 Tab 0    levothyroxine (SYNTHROID) 137 mcg tablet TAKE 1 TABLET BY MOUTH EVERY DAY 90 Tab 2    flash glucose scanning reader ([employer] MICHAEL 14 DAY READER) misc Use to check blood sugars throughout the day 1 Each 0    traZODone (DESYREL) 100 mg tablet TAKE 3 TABLETS BY MOUTH AT BEDTIME (Patient taking differently: Take 200 mg by mouth nightly.) 270 Tab 1    DAILY MULTI-VITAMIN PO Take 1 Tab by mouth daily.  atomoxetine (STRATTERA) 40 mg capsule TAKE 1 CAPSULE BY MOUTH EVERY DAY IN THE MORNING      lancets misc Use to test blood sugars once daily. Dx:E11.9 100 Each 3    ONETOUCH ULTRA BLUE TEST STRIP strip TEST SUGARS ONCE DAILY AS DIRECTED 50 Strip 0        No Known Allergies    Social History     Tobacco Use    Smoking status: Never Smoker    Smokeless tobacco: Never Used   Substance Use Topics    Alcohol use: No     Alcohol/week: 0.0 standard drinks    Drug use: No       Family History   Problem Relation Age of Onset    Diabetes Mother     Cancer Mother         breast    Stroke Mother     Breast Cancer Mother 57        80 second time    Hypertension Father     Elevated Lipids Father     Migraines Father     Dementia Father      Examination  Visit Vitals  /70 (BP 1 Location: Left upper arm, BP Patient Position: Sitting, BP Cuff Size: Adult)   Pulse 88   Temp 97.5 °F (36.4 °C)   Resp 18   Ht 5' 4\" (1.626 m)   Wt 71.2 kg (157 lb)   SpO2 97%   BMI 26.95 kg/m²     Neurologically, she is awake, alert, and oriented with normal speech and language. Her cognition is normal.    Intact cranial nerves 2-12. No nystagmus. Disk margins are flat bilaterally. She has normal bulk and tone. She has no abnormal movement. She has no pronation or drift. She generates full strength in the upper and lower extremities to direct confrontational testing. Reflexes are symmetrical in the upper and lower extremities bilaterally. No pathologic reflexes are elicited.   Finger nose finger and rapid alternating movements are normal.  Steady gait. Graded sensory loss to the ankles bilaterally    Impression/Plan  48-year-old lady with diabetes and dysesthesias in her feet likely peripheral neuropathy due to diabetes  EMG of the lower extremities to confirm diagnosis  Discontinue Neurontin  Start Lyrica 150 mg 3 times daily  EMLA cream    Follow-up after EMG    Irineo Jerome MD      This note was created using voice recognition software. Despite editing, there may be syntax errors.

## 2021-03-18 ENCOUNTER — TELEPHONE (OUTPATIENT)
Dept: NEUROLOGY | Age: 66
End: 2021-03-18

## 2021-03-18 NOTE — TELEPHONE ENCOUNTER
----- Message from Agustin Aguilar sent at 3/18/2021 11:24 AM EDT -----  Regarding: Dr. Mary Chaudhry Message/Vendor Calls    Caller's first and last name: Pt      Reason for call: medications unable to be filled      Callback required yes/no and why: Yes, to provide information to patient      Best contact number(s): [phone] (leave message if no answer)      Details to clarify the request: Pt is calling in regards to the two prescriptions that were called in for her yesterday. She said the pharmacy is unable to fill it at this time. They told her that Dr. Bhavesh Ramsey needed 'to push it through'. She said that her medications are covered through '[employer]'. Their phone number if needed is [phone]. Her member ID of this card is [id number]. Please advise.       Agustin Aguilar

## 2021-03-19 NOTE — TELEPHONE ENCOUNTER
----- Message from [facility] sent at 3/18/2021  3:44 PM EDT -----  Regarding: /Telephone     Caller's first and last name:Pt  Reason for call: insurance coverage problems  Callback required yes/no and why:Yes  Best contact number(s): [phone]  Details to clarify the request: Pt insurance is not covering rx lyrica and pregablin and [facility] pharmacy advised pt have the doctor push this through so that she would be able to get her meds.

## 2021-03-22 ENCOUNTER — TELEPHONE (OUTPATIENT)
Dept: NEUROLOGY | Age: 66
End: 2021-03-22

## 2021-03-22 NOTE — TELEPHONE ENCOUNTER
----- Message from Chico Colon sent at 3/19/2021  3:35 PM EDT -----  Regarding: Dr. Magdy Ramos Message/Vendor Calls    Caller's first and last name: Pt      Reason for call: PA for medications      Callback required yes/no and why: Yes, provide status update of PA      Best contact number(s): [phone]      Details to clarify the request: Pt is calling to check on the status of the PA request that was submitted for Lyrica and lidocaine-prilocaine (EMLA) topical cream. She is leaving Sunday for vacation and would really like to have these medications before she leaves. Please reach out to pt.       Chico Colon

## 2021-03-22 NOTE — TELEPHONE ENCOUNTER
S/w pt, notified that Sanju Barragan, our prior auth coordinator is working on this.   LVM last week with pt

## 2021-03-23 DIAGNOSIS — K21.00 GERD WITH ESOPHAGITIS: ICD-10-CM

## 2021-03-23 RX ORDER — PANTOPRAZOLE SODIUM 40 MG/1
TABLET, DELAYED RELEASE ORAL
Qty: 90 TAB | Refills: 1 | Status: SHIPPED | OUTPATIENT
Start: 2021-03-23 | End: 2021-09-22

## 2021-03-29 ENCOUNTER — TELEPHONE (OUTPATIENT)
Dept: NEUROLOGY | Age: 66
End: 2021-03-29

## 2021-03-29 NOTE — TELEPHONE ENCOUNTER
S/w pt regarding lyrica and emla. She is using the good rx coupon for both. They need to be sent [facility].   Phone in per rx sent on 3/17/21

## 2021-03-29 NOTE — TELEPHONE ENCOUNTER
----- Message from Anna Meadows sent at 3/29/2021  2:29 PM EDT -----  Regarding: Dr. Fong Media (if not patient):      Relationship of caller (if not patient):      Best contact number(s):[phone]      Name of medication and dosage if known: Lyrica and Cream for Neuropathy      Is patient out of this medication (yes/no): yes      Pharmacy name: [facility]    Pharmacy listed in chart? (yes/no): yes  Pharmacy phone number:      Details to clarify the request:      Anna Meadows

## 2021-03-30 NOTE — TELEPHONE ENCOUNTER
RE LIDOCAINE-PRILOCAINE    STATUS APPROVED [address] VIA CMM  Key: [facility] Case ID: [id number] Outcome     Your request has been approved    RE PREGABALIN    STATUS APPROVED  Key: [place]

## 2021-04-05 ENCOUNTER — TELEPHONE (OUTPATIENT)
Dept: INTERNAL MEDICINE CLINIC | Age: 66
End: 2021-04-05

## 2021-04-05 NOTE — TELEPHONE ENCOUNTER
----- Message from José Kim sent at 4/5/2021 12:15 PM EDT -----  Regarding: Dr. Olinda Skinner first and last name: Mirna Richter/daughter      Reason for call: [facility] Needs a \"referral\" for consultation.  Fax  required yes/no and why: yes      Best contact number(s): [phone]      Details to clarify the request: n/a       José Kim

## 2021-04-08 NOTE — TELEPHONE ENCOUNTER
Guarantor Accounts     New Order needed    Visit Account    Chantal Christy  Type: Personal/Family  Service Area: [facility]  Mylene Status: Verified  Hosp SP Bal: 0.00  Prof SP Bal: 0.00  Total SP Bal: 0.00  Acct status:  Bad Debt Balance  Coverages    1) E-VA MEDICARE/VA MEDICARE PART A & B  Cvg status: E-Verified  Effective from: 4/1/2009  Subscriber: CHANTAL CHRISTY  2) E-BLUE CROSS/VA BLUE CROSS SUPPLEMENT MEDICARE  Cvg status: E-Verified  Effective from: 10/1/2019  Subscriber: CHANTAL CHRISTY

## 2021-04-17 ENCOUNTER — APPOINTMENT (OUTPATIENT)
Dept: CT IMAGING | Age: 66
End: 2021-04-17
Attending: PHYSICIAN ASSISTANT
Payer: MEDICARE

## 2021-04-17 ENCOUNTER — APPOINTMENT (OUTPATIENT)
Dept: GENERAL RADIOLOGY | Age: 66
End: 2021-04-17
Attending: PHYSICIAN ASSISTANT
Payer: MEDICARE

## 2021-04-17 ENCOUNTER — HOSPITAL ENCOUNTER (EMERGENCY)
Age: 66
Discharge: HOME | End: 2021-04-17
Attending: EMERGENCY MEDICINE
Payer: MEDICARE

## 2021-04-17 VITALS
HEIGHT: 64 IN | WEIGHT: 158.73 LBS | RESPIRATION RATE: 16 BRPM | TEMPERATURE: 97.8 F | OXYGEN SATURATION: 96 % | SYSTOLIC BLOOD PRESSURE: 123 MMHG | BODY MASS INDEX: 27.1 KG/M2 | HEART RATE: 73 BPM | DIASTOLIC BLOOD PRESSURE: 78 MMHG

## 2021-04-17 DIAGNOSIS — M62.838 MUSCLE SPASM: ICD-10-CM

## 2021-04-17 DIAGNOSIS — M50.30 DDD (DEGENERATIVE DISC DISEASE), CERVICAL: ICD-10-CM

## 2021-04-17 DIAGNOSIS — M19.011 OSTEOARTHRITIS OF RIGHT SHOULDER, UNSPECIFIED OSTEOARTHRITIS TYPE: ICD-10-CM

## 2021-04-17 DIAGNOSIS — S16.1XXA STRAIN OF NECK MUSCLE, INITIAL ENCOUNTER: Primary | ICD-10-CM

## 2021-04-17 PROCEDURE — 73030 X-RAY EXAM OF SHOULDER: CPT

## 2021-04-17 PROCEDURE — 74011250637 HC RX REV CODE- 250/637: Performed by: PHYSICIAN ASSISTANT

## 2021-04-17 PROCEDURE — 99282 EMERGENCY DEPT VISIT SF MDM: CPT

## 2021-04-17 PROCEDURE — 72125 CT NECK SPINE W/O DYE: CPT

## 2021-04-17 RX ORDER — METHOCARBAMOL 750 MG/1
750 TABLET, FILM COATED ORAL
Status: COMPLETED | OUTPATIENT
Start: 2021-04-17 | End: 2021-04-17

## 2021-04-17 RX ORDER — DICLOFENAC SODIUM 10 MG/G
GEL TOPICAL 4 TIMES DAILY
Qty: 50 G | Refills: 0 | Status: SHIPPED | OUTPATIENT
Start: 2021-04-17 | End: 2022-04-18 | Stop reason: ALTCHOICE

## 2021-04-17 RX ORDER — NAPROXEN 250 MG/1
500 TABLET ORAL
Status: COMPLETED | OUTPATIENT
Start: 2021-04-17 | End: 2021-04-17

## 2021-04-17 RX ORDER — METHOCARBAMOL 500 MG/1
500 TABLET, FILM COATED ORAL
Qty: 10 TAB | Refills: 0 | Status: SHIPPED | OUTPATIENT
Start: 2021-04-17 | End: 2021-10-04

## 2021-04-17 RX ADMIN — METHOCARBAMOL TABLETS 750 MG: 750 TABLET, COATED ORAL at 11:13

## 2021-04-17 RX ADMIN — NAPROXEN 500 MG: 250 TABLET ORAL at 11:13

## 2021-04-17 NOTE — ED TRIAGE NOTES
Patient presents with 4-week history of right shoulder pain that began after repetitive moving/packing/lifting associated with travel. Experience slight relief w/ 3-day course of steroids 2 weeks ago, but here for pain relief today. Has PCP appt scheduled tomorrow.

## 2021-04-17 NOTE — ED PROVIDER NOTES
14-year-old female with history of hypertension, depression, depression, PTSD on Klonopin 3 times daily presents ambulatory with daughter for evaluation of 4 weeks of right posterior muscle pain with intermittent pain down the arm into the right index finger. She states 4 weeks ago she was at the beach and did lots of heavy lifting. She denies trauma, falling or head injury. She denies history of cervical issues, surgery or shoulder problems in the past.  She has never seen an orthopedist.  [facility] and prescribed prednisone which she states helped for 2 days but the symptoms returned. She states sometimes the  in her right hand feels weak due to the pain which is intermittent. She denies arm swelling, fever, chills, headache, chest pain, shortness of breath, diarrhea, saddle anesthesia, bowel or bladder incontinence, diaphoresis, dizziness. She is a follow-up with her PCP on Monday for this complaint but is here for pain control. She has not had any imaging of her neck or shoulder since symptoms began.            Past Medical History:   Diagnosis Date    ADD (attention deficit disorder) 8/17/2009    Breast cancer ([facility] [address] 2012    Left Breast    Depressive disorder, not elsewhere classified 8/17/2009    Essential hypertension, benign 8/17/2009    History of mammogram 02/17/2020    BI-RADS Category 3    Hypertension     Pap smear for cervical cancer screening 07/28/2016; 01/09/2020    negative, HPV negative; negative, HPV negative    Psychiatric disorder 2007    panic attacks/PTSD    PTSD (post-traumatic stress disorder)     Thyroid disease     Type 2 diabetes mellitus without complication ([facility] [address] 3/55/4740    Unspecified hypothyroidism 8/17/2009       Past Surgical History:   Procedure Laterality Date    HX BREAST LUMPECTOMY Left 2012    HX GYN      c section    HX HEENT  1992    partial thyroidectomy    HX TONSILLECTOMY      HX UROLOGICAL  1992    Bladder suspension    [place] BREAST SURGERY PROCEDURE UNLISTED  January 2012    left breast biopsy         Family History:   Problem Relation Age of Onset    Diabetes Mother     Cancer Mother         breast    Stroke Mother     Breast Cancer Mother 57        80 second time    Hypertension Father     Elevated Lipids Father     Migraines Father     Dementia Father        Social History     Socioeconomic History    Marital status:      Spouse name: Not on file    Number of children: Not on file    Years of education: Not on file    Highest education level: Not on file   Occupational History    Not on file   Social Needs    Financial resource strain: Not on file    Food insecurity     Worry: Not on file     Inability: Not on file    Transportation needs     Medical: Not on file     Non-medical: Not on file   Tobacco Use    Smoking status: Never Smoker    Smokeless tobacco: Never Used   Substance and Sexual Activity    Alcohol use: No     Alcohol/week: 0.0 standard drinks    Drug use: No    Sexual activity: Never     Partners: Male   Lifestyle    Physical activity     Days per week: Not on file     Minutes per session: Not on file    Stress: Not on file   Relationships    Social connections     Talks on phone: Not on file     Gets together: Not on file     Attends Rastafarian service: Not on file     Active member of club or organization: Not on file     Attends meetings of clubs or organizations: Not on file     Relationship status: Not on file    Intimate partner violence     Fear of current or ex partner: Not on file     Emotionally abused: Not on file     Physically abused: Not on file     Forced sexual activity: Not on file   Other Topics Concern    Not on file   Social History Narrative    Not on file         ALLERGIES: Patient has no known allergies. Review of Systems   Constitutional: Negative. Negative for activity change, chills, fatigue and unexpected weight change.    HENT: Negative for trouble swallowing. Respiratory: Negative for cough, chest tightness, shortness of breath and wheezing. Cardiovascular: Negative. Negative for chest pain and palpitations. Gastrointestinal: Negative. Negative for abdominal pain, diarrhea, nausea and vomiting. Genitourinary: Negative. Negative for dysuria, flank pain, frequency and hematuria. Musculoskeletal: Positive for arthralgias, neck pain and neck stiffness. Negative for back pain. Skin: Negative. Negative for color change and rash. Neurological: Negative. Negative for dizziness, numbness and headaches. All other systems reviewed and are negative. Vitals:    04/17/21 1043   BP: 123/78   Pulse: 73   Resp: 16   Temp: 97.8 °F (36.6 °C)   SpO2: 96%   Weight: 72 kg (158 lb 11.7 oz)   Height: 5' 4\" (1.626 m)            Physical Exam  Vitals signs and nursing note reviewed. Constitutional:       General: She is not in acute distress. Appearance: Normal appearance. She is well-developed. She is not toxic-appearing or diaphoretic. Comments: WF, elevated BMI   HENT:      Head: Normocephalic and atraumatic. Eyes:      General:         Right eye: No discharge. Left eye: No discharge. Conjunctiva/sclera: Conjunctivae normal.      Pupils: Pupils are equal, round, and reactive to light. Neck:      Musculoskeletal: Full passive range of motion without pain, normal range of motion and neck supple. Trachea: No tracheal tenderness. Comments: No midline spinous process TTP. R paracervical tenderness with muscle spasm; point tender to R mid trap. LROM of R shoulder due to pain in trap; LROM of neck with lateral rotation due to reproducible pain R trap. Strength 4/5 in R hand, 5/5 in L hand- states due to pain. NVI throughout. Capp refill brisk. Cardiovascular:      Rate and Rhythm: Normal rate and regular rhythm. Pulses: Normal pulses. Heart sounds: Normal heart sounds. No murmur. No friction rub.  No gallop. Pulmonary:      Effort: Pulmonary effort is normal. No respiratory distress. Breath sounds: Normal breath sounds. No wheezing or rales. Chest:      Chest wall: No tenderness. Abdominal:      General: Bowel sounds are normal. There is no distension. Palpations: Abdomen is soft. Tenderness: There is no abdominal tenderness. There is no guarding or rebound. Musculoskeletal: Normal range of motion. General: Tenderness present. Skin:     General: Skin is warm and dry. Capillary Refill: Capillary refill takes less than 2 seconds. Findings: No abrasion, erythema or rash. Neurological:      General: No focal deficit present. Mental Status: She is alert and oriented to person, place, and time. Cranial Nerves: No cranial nerve deficit. Sensory: No sensory deficit. Coordination: Coordination normal.   Psychiatric:         Speech: Speech normal.         Behavior: Behavior normal.          MDM  Number of Diagnoses or Management Options  Diagnosis management comments:   DDx: Muscle spasm, HNP, cervical radiculopathy, shoulder strain, DJD, DDD       Amount and/or Complexity of Data Reviewed  Tests in the radiology section of CPT®: ordered and reviewed  Review and summarize past medical records: yes  Discuss the patient with other providers: yes    Patient Progress  Patient progress: stable         Procedures    I discussed patient's PMH, exam findings as well as careplan with the ER attending who agrees with care plan. Katie Chi PA-C      DISCHARGE NOTE:  12:00 PM  The patient has been re-evaluated and feeling much better and are stable for discharge. All available radiology and laboratory results have been reviewed with patient and/or available family.   Patient and/or family verbally conveyed their understanding and agreement of the patient's signs, symptoms, diagnosis, treatment and prognosis and additionally agree to follow-up as recommended in the discharge instructions or to return to the Emergency Department should their condition change or worsen prior to their follow-up appointment. All questions have been answered and patient and/or available family express understanding. IMAGING RESULTS:  Xr Shoulder Rt Ap/lat Min 2 V    Result Date: 4/17/2021  1. High riding humeral head compatible with chronic rotator cuff tear. Ct Spine Cerv Wo Cont    Result Date: 4/17/2021  Multilevel spondylosis, spinal stenosis, neural foraminal narrowing, and facet degenerative changes without acute abnormality       MEDICATIONS GIVEN:  Medications   naproxen (NAPROSYN) tablet 500 mg (500 mg Oral Given 4/17/21 1113)   methocarbamoL (ROBAXIN) tablet 750 mg (750 mg Oral Given 4/17/21 1113)       IMPRESSION:  1. Strain of neck muscle, initial encounter    2. DDD (degenerative disc disease), cervical    3. Osteoarthritis of right shoulder, unspecified osteoarthritis type    4. Muscle spasm        PLAN:  Follow-up Information     Follow up With Specialties Details Why Contact Info    Sarah Maldonado MD Internal Medicine   [address] [facility]  [address] [place] [address]  [phone]          Discharge Medication List as of 4/17/2021 12:09 PM      START taking these medications    Details   methocarbamoL (Robaxin) 500 mg tablet Take 1 Tab by mouth three (3) times daily as needed for Muscle Spasm(s). , Normal, Disp-10 Tab, R-0      diclofenac (VOLTAREN) 1 % gel Apply  to affected area four (4) times daily. , Normal, Disp-50 g, R-0         CONTINUE these medications which have NOT CHANGED    Details   meloxicam (MOBIC) 15 mg tablet TAKE 1 TABLET BY MOUTH EVERY DAY, Normal, Disp-90 Tab, R-1      rOPINIRole (REQUIP) 4 mg tab TAB TAKE 1 TABLET BY MOUTH EVERY DAY, Normal, Disp-90 Tab, R-0      pantoprazole (PROTONIX) 40 mg tablet TAKE 1 TABLET BY MOUTH EVERY DAY, Normal, Disp-90 Tab, R-1      atomoxetine (STRATTERA) 40 mg capsule TAKE 1 CAPSULE BY MOUTH EVERY DAY IN THE MORNING, Historical Med      pregabalin (LYRICA) 150 mg capsule Take 1 Cap by mouth three (3) times daily. Max Daily Amount: 450 mg., Normal, Disp-90 Cap, R-3      lidocaine-prilocaine (EMLA) topical cream Apply  to affected area as needed for Pain., Normal, Disp-30 g, R-3      metFORMIN ER (GLUCOPHAGE XR) 500 mg tablet TAKE TWO TABLETS BY MOUTH EVERY MORNING AND 1 TABLET AT NIGHT, Normal, Disp-270 Tab, R-1      clonazePAM (KlonoPIN) 1 mg tablet TAKE 1 TAB BY MOUTH THREE (3) TIMES DAILY. MAX DAILY AMOUNT: 3 MG., Normal, Disp-90 Tab, R-0Not to exceed 5 additional fills before 07/26/2021 DX Code Needed  . [employer] Yaquelin 14 Day Sensor kit APPLY 1 SENSOR EVERY 14 DAYS TO CHECK BLOOD SUGARS DX:E11.9, Normal, Disp-2 Kit, R-3      lamoTRIgine (LaMICtal) 25 mg tablet Take 25 mg by mouth daily. , Historical Med      albuterol (PROVENTIL HFA, VENTOLIN HFA, PROAIR HFA) 90 mcg/actuation inhaler Take 2 Puffs by inhalation every four (4) hours as needed for Wheezing., Normal, Disp-1 Inhaler, R-1      losartan (COZAAR) 100 mg tablet TAKE 1 TABLET BY MOUTH EVERY DAY, Normal, Disp-90 Tab, R-1      simvastatin (ZOCOR) 10 mg tablet TAKE 1 TABLET BY MOUTH EVERY DAY AT NIGHT, Normal, Disp-90 Tab,R-0      levothyroxine (SYNTHROID) 137 mcg tablet TAKE 1 TABLET BY MOUTH EVERY DAY, Normal, Disp-90 Tab,R-2      lancets misc Use to test blood sugars once daily. Dx:E11.9, Normal, Disp-100 Each, R-3      flash glucose scanning reader ([employer] YAQUELIN 14 DAY READER) misc Use to check blood sugars throughout the day, Normal, Disp-1 Each, R-0      ONETOUCH ULTRA BLUE TEST STRIP strip TEST SUGARS ONCE DAILY AS DIRECTED, Normal, Disp-50 Strip, R-0      traZODone (DESYREL) 100 mg tablet TAKE 3 TABLETS BY MOUTH AT BEDTIME, NormalNEEDS REFILLSDisp-270 Tab, R-1      DAILY MULTI-VITAMIN PO Take 1 Tab by mouth daily. , Historical Med

## 2021-04-19 ENCOUNTER — OFFICE VISIT (OUTPATIENT)
Dept: INTERNAL MEDICINE CLINIC | Age: 66
End: 2021-04-19
Payer: MEDICARE

## 2021-04-19 VITALS
HEART RATE: 71 BPM | WEIGHT: 162 LBS | RESPIRATION RATE: 18 BRPM | BODY MASS INDEX: 27.66 KG/M2 | DIASTOLIC BLOOD PRESSURE: 78 MMHG | HEIGHT: 64 IN | TEMPERATURE: 97.5 F | OXYGEN SATURATION: 96 % | SYSTOLIC BLOOD PRESSURE: 126 MMHG

## 2021-04-19 DIAGNOSIS — G62.9 NEUROPATHY: ICD-10-CM

## 2021-04-19 DIAGNOSIS — I10 ESSENTIAL HYPERTENSION, BENIGN: ICD-10-CM

## 2021-04-19 DIAGNOSIS — G25.81 RESTLESS LEG SYNDROME: ICD-10-CM

## 2021-04-19 DIAGNOSIS — E11.9 TYPE 2 DIABETES MELLITUS WITHOUT COMPLICATION, WITHOUT LONG-TERM CURRENT USE OF INSULIN (HCC): ICD-10-CM

## 2021-04-19 DIAGNOSIS — E78.2 MIXED HYPERLIPIDEMIA: ICD-10-CM

## 2021-04-19 DIAGNOSIS — F31.81 BIPOLAR 2 DISORDER (HCC): ICD-10-CM

## 2021-04-19 DIAGNOSIS — M54.2 NECK PAIN: Primary | ICD-10-CM

## 2021-04-19 DIAGNOSIS — E03.4 HYPOTHYROIDISM DUE TO ACQUIRED ATROPHY OF THYROID: ICD-10-CM

## 2021-04-19 PROCEDURE — 2022F DILAT RTA XM EVC RTNOPTHY: CPT | Performed by: INTERNAL MEDICINE

## 2021-04-19 PROCEDURE — G9899 SCRN MAM PERF RSLTS DOC: HCPCS | Performed by: INTERNAL MEDICINE

## 2021-04-19 PROCEDURE — G8536 NO DOC ELDER MAL SCRN: HCPCS | Performed by: INTERNAL MEDICINE

## 2021-04-19 PROCEDURE — G8432 DEP SCR NOT DOC, RNG: HCPCS | Performed by: INTERNAL MEDICINE

## 2021-04-19 PROCEDURE — G8754 DIAS BP LESS 90: HCPCS | Performed by: INTERNAL MEDICINE

## 2021-04-19 PROCEDURE — G8400 PT W/DXA NO RESULTS DOC: HCPCS | Performed by: INTERNAL MEDICINE

## 2021-04-19 PROCEDURE — G0463 HOSPITAL OUTPT CLINIC VISIT: HCPCS | Performed by: INTERNAL MEDICINE

## 2021-04-19 PROCEDURE — G8752 SYS BP LESS 140: HCPCS | Performed by: INTERNAL MEDICINE

## 2021-04-19 PROCEDURE — 3044F HG A1C LEVEL LT 7.0%: CPT | Performed by: INTERNAL MEDICINE

## 2021-04-19 PROCEDURE — 1101F PT FALLS ASSESS-DOCD LE1/YR: CPT | Performed by: INTERNAL MEDICINE

## 2021-04-19 PROCEDURE — 1090F PRES/ABSN URINE INCON ASSESS: CPT | Performed by: INTERNAL MEDICINE

## 2021-04-19 PROCEDURE — 3017F COLORECTAL CA SCREEN DOC REV: CPT | Performed by: INTERNAL MEDICINE

## 2021-04-19 PROCEDURE — G8419 CALC BMI OUT NRM PARAM NOF/U: HCPCS | Performed by: INTERNAL MEDICINE

## 2021-04-19 PROCEDURE — G8427 DOCREV CUR MEDS BY ELIG CLIN: HCPCS | Performed by: INTERNAL MEDICINE

## 2021-04-19 PROCEDURE — 99214 OFFICE O/P EST MOD 30 MIN: CPT | Performed by: INTERNAL MEDICINE

## 2021-04-19 RX ORDER — GUANFACINE 4 MG/1
4 TABLET, EXTENDED RELEASE ORAL DAILY
COMMUNITY
End: 2021-10-27

## 2021-04-19 NOTE — PROGRESS NOTES
Alyce Humphreys (: 1955) is a 76 y.o. female, established patient, here for evaluation of the following chief complaint(s):  Follow-up (pinched nerve)       ASSESSMENT/PLAN:  1. Neck pain  Not at goal. Discussed with pt that she has frozen shoulder in R shoulder. Informed pt that she will need to do PT to help loosen the muscles that have now contracted all alone her R side neck, shoulder, and arm. Will refer to ortho if PT does not provide relief. Will continue to monitor for improvements or changes. 2. Type 2 diabetes mellitus without complication, without long-term current use of insulin (HCC)  Sugars stable. Continue to follow diabetic diet and monitor sugars. Continue on Metformin. 3. Restless leg syndrome  Stable and well-managed. Continue with ongoing regimen of Klonopin and Requip. 4. Hypothyroidism due to acquired atrophy of thyroid  Thyroid stable. I do not recommend a change in Synthroid. 5. Essential hypertension, benign  BP is at goal. I do not recommend any change in losartan. 6. Mixed hyperlipidemia  Stable, patient is tolerating medications, no myalgias. I do not recommend any change in Zocor. 7. Bipolar 2 disorder ([facility] [address]  Followed by psychiatric NP. Stable and well-managed. Continue with ongoing regimen of Lamictal.     8. Neuropathy  Not at goal. Directed pt to apply Emla topical as Rx by Dr. Lynn Josue to manage her neuropathy. Will continue to monitor for improvements or changes. SUBJECTIVE/OBJECTIVE:  HPI  Pt reports that she has had R shoulder pain since 2 years ago ever since she tried picking up her  when he fell and she fell back down with him. She notes that she has been especially stressed. She states that she now has a pinched nerve in R side base of her neck which is radiating down her R shoulder and arm which flared when she was packing her car to go to the beach. Pt reports that she went to [facility] for evaluation.  She notes that she is taking Tylenol PRN to manage her pain. She states that now her R hand is very weak. Diabetic Review of Systems - further diabetic ROS: no polyuria or polydipsia, no chest pain, dyspnea or TIA's, no numbness, tingling or pain in extremities. Other symptoms and concerns: Pt reports monitoring BG at home at reports values ranging from . Lab Results   Component Value Date/Time    Hemoglobin A1c 6.5 (H) 02/09/2021 01:36 PM    Hemoglobin A1c (POC) 6.4 06/24/2020 02:09 PM        BPD: Pt reports that she is seeing psychiatric NP and needs to send labs [facility]). She notes that she was put on Strattera for her attention deficit which did not work for her so she dc. Pt is currently complying with guanfacine 1 mg for 14 days and then will increase to 2 mg. Neuropathy: Followed by Dr. Rigoberto Souza. Pt reports that she was Rx Emla topical which she has not been using since she     RLS: Stable, pt continues to comply with Klonopin and Requip.     hypothyroidism. Lab Results   Component Value Date/Time    TSH 0.33 (L) 12/04/2020 10:10 AM     Thyroid ROS: denies fatigue, weight changes, heat/cold intolerance, bowel/skin changes or CVS symptoms. Discussed with pt that soreness can be exacerbated by stress. Review of Systems   Musculoskeletal:        R shoulder pain, R side neck pain   All other systems reviewed and are negative. Physical Exam  Constitutional:       Appearance: Normal appearance. HENT:      Right Ear: Tympanic membrane and external ear normal.      Left Ear: Tympanic membrane and external ear normal.      Mouth/Throat:      Mouth: Mucous membranes are moist.      Pharynx: Oropharynx is clear. Cardiovascular:      Rate and Rhythm: Normal rate and regular rhythm. Pulses: Normal pulses. Heart sounds: Normal heart sounds. Pulmonary:      Effort: Pulmonary effort is normal.      Breath sounds: Normal breath sounds. Musculoskeletal: Normal range of motion.       Comments: R shoulder and R elbow pain with R arm abduction consistent with frozen shoulder    Skin:     General: Skin is warm and dry. Neurological:      General: No focal deficit present. Mental Status: She is alert and oriented to person, place, and time. Psychiatric:         Mood and Affect: Mood normal.         Behavior: Behavior normal.           On this date 04/19/2021 I have spent 30 minutes reviewing previous notes, test results and face to face with the patient discussing the diagnosis and importance of compliance with the treatment plan as well as documenting on the day of the visit. Lab results and schedule of future lab studies reviewed with patient. Reviewed diet, exercise and weight control. Written by Miguel Sadler, as dictated by Katherin Tejada MD.     Current diagnosis and concerns discussed with pt at length. Understands risks and benefits or current treatment plan and medications and accepts the treatment and medication with any possible risks. Pt asks appropriate questions which were answered. Pt instructed to call with any concerns or problems.

## 2021-05-11 RX ORDER — DULAGLUTIDE 0.75 MG/.5ML
0.75 INJECTION, SOLUTION SUBCUTANEOUS
Qty: 4 SYRINGE | Refills: 2 | Status: SHIPPED | OUTPATIENT
Start: 2021-05-11 | End: 2021-08-02

## 2021-05-28 DIAGNOSIS — E03.9 ACQUIRED HYPOTHYROIDISM: ICD-10-CM

## 2021-05-28 RX ORDER — LEVOTHYROXINE SODIUM 137 UG/1
TABLET ORAL
Qty: 90 TABLET | Refills: 2 | Status: SHIPPED | OUTPATIENT
Start: 2021-05-28 | End: 2022-02-25

## 2021-06-10 ENCOUNTER — OFFICE VISIT (OUTPATIENT)
Dept: NEUROLOGY | Age: 66
End: 2021-06-10

## 2021-06-10 DIAGNOSIS — R20.2 PARESTHESIA: Primary | ICD-10-CM

## 2021-06-10 NOTE — PROCEDURES
EMG/ NCS Report  [facility] - INPATIENT  [address] [facility], [address] [place], [address]   Ph: [phone]   FAX: [phone]/ [phone]  Test Date:  2019      Test Date:  6/10/2021    Patient: Agnieszka Francisco : 1955 Physician: Toni Matta MD   Sex: Female Height: ' \" Ref Phys: oRsita Gomes MD   ID#: [id number] Weight:  lbs. Technician: Tigre Espino     Patient History / Exam:  1-2 yrs of numbness of both feet, burning pins and needles. (-) weakness  (+) diabetes    Patient is coming for neuropathy evaluation. EMG & NCV Findings:  Evaluation of the left Fibular motor and the right Fibular motor nerves showed normal distal onset latency (L5.2, R5.2 ms), normal amplitude (L2.2, R2.5 mV), normal conduction velocity (B Fib-Ankle, L45, R44 m/s), and normal conduction velocity (Poplt-B Fib, L45, R59 m/s). The left tibial motor and the right tibial motor nerves showed normal distal onset latency (L3.9, R3.4 ms), normal amplitude (L5.3, R8.3 mV), and normal conduction velocity (Knee-Ankle, L45, R41 m/s). The left Sup Fibular sensory and the right Sup Fibular sensory nerves showed normal distal peak latency (L2.8, R2.5 ms), normal amplitude (L4.2, R4.5 µV), and normal conduction velocity (Lower leg-Lat ankle, L43, R53 m/s). The left sural sensory and the right sural sensory nerves showed normal distal peak latency (L3.6, R3.5 ms) and normal amplitude (L4.2, R6.7 µV). All F Wave latencies were within normal limits. All F Wave left vs. right side latency differences were within normal limits. All H Reflex left vs. right side latency differences were within normal limits. All examined muscles (as indicated in the following table) showed no evidence of electrical instability.         Impression:    Extensive electrodiagnostic examination of the right and left lower extremities is normal.    Specifically, there is no evidence of a peripheral neuropathy of the large-fiber type or lumbosacral motor radiculopathy.         Lisandro Escoto MD  Diplomate, American Board of Psychiatry and Neurology  Diplomate, Neuromuscular Medicine  Diplomate, American Board of Electrodiagnostic Medicine  Director, [address] [place] [address] Accredited Laboratory with Exemplary Status          Nerve Conduction Studies  Anti Sensory Summary Table     Stim Site NR Peak (ms) Norm Peak (ms) P-T Amp (µV) Norm P-T Amp Site1 Site2 Dist (cm)   Left Sup Fibular Anti Sensory (Lat ankle)  31°C   Lower leg    2.8 <4.6 4.2 >4 Lower leg Lat ankle 10.0   Right Sup Fibular Anti Sensory (Lat ankle)  30.9°C   Lower leg    2.5 <4.6 4.5 >4 Lower leg Lat ankle 10.0   Left Sural Anti Sensory (Lat Mall)  30.9°C   Calf    3.6 <4.5 4.2 >4.0 Calf Lat Mall 14.0   Right Sural Anti Sensory (Lat Mall)  30.2°C   Calf    3.5 <4.5 6.7 >4.0 Calf Lat Mall 14.0     Motor Summary Table     Stim Site NR Onset (ms) Norm Onset (ms) O-P Amp (mV) Norm O-P Amp Amp (Prev) (%) Site1 Site2 Dist (cm) Tien (m/s) Norm Tien (m/s)   Left Fibular Motor (Ext Dig Brev)  31.1°C   Ankle    5.2 <6.5 2.2 >1.1 100.0 Ankle Ext Dig Brev 8.0     B Fib    11.6  1.5  68.2 B Fib Ankle 29.0 45 >38   Poplt    13.8  1.4  93.3 Poplt B Fib 10.0 45 >42   Right Fibular Motor (Ext Dig Brev)  30.9°C   Ankle    5.2 <6.5 2.5 >1.1 100.0 Ankle Ext Dig Brev 8.0     B Fib    11.4  2.3  92.0 B Fib Ankle 27.0 44 >38   Poplt    13.1  2.1  91.3 Poplt B Fib 10.0 59 >42   Left Tibial Motor (Abd Hoffman Brev)  31.4°C   Ankle    3.9 <6.1 5.3 >1.1 100.0 Ankle Abd Hoffman Brev 8.0     Knee    11.4  3.8  71.7 Knee Ankle 34.0 45 >39   Right Tibial Motor (Abd Hoffman Brev)  31.6°C   Ankle    3.4 <6.1 8.3 >1.1 100.0 Ankle Abd Hoffman Brev 8.0     Knee    11.7  5.0  60.2 Knee Ankle 34.0 41 >39     F Wave Studies     NR F-Lat (ms) Lat Norm (ms) L-R F-Lat (ms) L-R Lat Norm   Left Tibial (Mrkrs) (Abd Hallucis)  31.4°C      51.96 <56 0.00 <5.7   Right Tibial (Mrkrs) (Abd Hallucis)  31.8°C      51.96 <56 0.00 <5.7     H Reflex Studies     NR H-Lat (ms) L-R H-Lat (ms) L-R Lat Norm   Left Tibial (Gastroc)  31.6°C      33.58 1.88 <2.0   Right Tibial (Gastroc)  31.9°C      35.46 1.88 <2.0     EMG     Side Muscle Nerve Root Ins Act Fibs Psw Recrt Duration Amp Poly Comment   Left Ext Dig Brev Dp Br Peron L5, S1 Nml Nml Nml Nml Nml Nml Nml    Left AbdHallucis MedPlantar S1-2 Nml Nml Nml Nml Nml Nml Nml    Left AntTibialis Dp Br Peron L4-5 Nml Nml Nml Nml Nml Nml Nml    Left MedGastroc Tibial S1-2 Nml Nml Nml Nml Nml Nml Nml    Left VastusLat Femoral L2-4 Nml Nml Nml Nml Nml Nml Nml    Right Ext Dig Brev Dp Br Peron L5, S1 Nml Nml Nml Nml Nml Nml Nml    Right AbdHallucis MedPlantar S1-2 Nml Nml Nml Nml Nml Nml Nml    Right AntTibialis Dp Br Peron L4-5 Nml Nml Nml Nml Nml Nml Nml    Right MedGastroc Tibial S1-2 Nml Nml Nml Nml Nml Nml Nml    Right VastusLat Femoral L2-4 Nml Nml Nml Nml Nml Nml Nml    Right GluteusMed SupGluteal L4-S1 Nml Nml Nml Nml Nml Nml Nml    Right Lower Lumb Parasp Rami L5,S1 Nml Nml Nml Nml Nml Nml Nml                Nerve Conduction Studies  Anti Sensory Left/Right Comparison     Stim Site L Lat (ms) R Lat (ms) L-R Lat (ms) L Amp (µV) R Amp (µV) L-R Amp (%) Site1 Site2 L Tien (m/s) R Tien (m/s) L-R Tien (m/s)   Sup Fibular Anti Sensory (Lat ankle)  31°C   Lower leg 2.3 1.9 0.4 4.2 4.5 6.7 Lower leg Lat ankle 43 53 10   Sural Anti Sensory (Lat Mall)  30.9°C   Calf 3.0 3.0 0.0 4.2 6.7 37.3 Calf Lat Mall 47 47 0     Motor Left/Right Comparison     Stim Site L Lat (ms) R Lat (ms) L-R Lat (ms) L Amp (mV) R Amp (mV) L-R Amp (%) Site1 Site2 L Tien (m/s) R Tien (m/s) L-R Tien (m/s)   Fibular Motor (Ext Dig Brev)  31.1°C   Ankle 5.2 5.2 0.0 2.2 2.5 12.0 Ankle Ext Dig Brev      B Fib 11.6 11.4 0.2 1.5 2.3 34.8 B Fib Ankle 45 44 1   Poplt 13.8 13.1 0.7 1.4 2.1 33.3 Poplt B Fib 45 59 14   Tibial Motor (Abd Hoffman Brev)  31.4°C   Ankle 3.9 3.4 0.5 5.3 8.3 36.1 Ankle Abd Hoffman Brev      Knee 11.4 11.7 0.3 3.8 5.0 24.0 Knee Ankle 45 41 4         Waveforms:

## 2021-06-22 ENCOUNTER — OFFICE VISIT (OUTPATIENT)
Dept: NEUROLOGY | Age: 66
End: 2021-06-22
Payer: MEDICARE

## 2021-06-22 VITALS
OXYGEN SATURATION: 100 % | HEART RATE: 88 BPM | DIASTOLIC BLOOD PRESSURE: 60 MMHG | HEIGHT: 64 IN | RESPIRATION RATE: 16 BRPM | WEIGHT: 156 LBS | SYSTOLIC BLOOD PRESSURE: 106 MMHG | BODY MASS INDEX: 26.63 KG/M2

## 2021-06-22 DIAGNOSIS — E11.42 DIABETIC POLYNEUROPATHY ASSOCIATED WITH TYPE 2 DIABETES MELLITUS (HCC): ICD-10-CM

## 2021-06-22 PROCEDURE — 2022F DILAT RTA XM EVC RTNOPTHY: CPT | Performed by: NURSE PRACTITIONER

## 2021-06-22 PROCEDURE — 1090F PRES/ABSN URINE INCON ASSESS: CPT | Performed by: NURSE PRACTITIONER

## 2021-06-22 PROCEDURE — G8432 DEP SCR NOT DOC, RNG: HCPCS | Performed by: NURSE PRACTITIONER

## 2021-06-22 PROCEDURE — G9899 SCRN MAM PERF RSLTS DOC: HCPCS | Performed by: NURSE PRACTITIONER

## 2021-06-22 PROCEDURE — G8752 SYS BP LESS 140: HCPCS | Performed by: NURSE PRACTITIONER

## 2021-06-22 PROCEDURE — 3044F HG A1C LEVEL LT 7.0%: CPT | Performed by: NURSE PRACTITIONER

## 2021-06-22 PROCEDURE — 99214 OFFICE O/P EST MOD 30 MIN: CPT | Performed by: NURSE PRACTITIONER

## 2021-06-22 PROCEDURE — G8754 DIAS BP LESS 90: HCPCS | Performed by: NURSE PRACTITIONER

## 2021-06-22 PROCEDURE — G8400 PT W/DXA NO RESULTS DOC: HCPCS | Performed by: NURSE PRACTITIONER

## 2021-06-22 PROCEDURE — 1101F PT FALLS ASSESS-DOCD LE1/YR: CPT | Performed by: NURSE PRACTITIONER

## 2021-06-22 PROCEDURE — G8419 CALC BMI OUT NRM PARAM NOF/U: HCPCS | Performed by: NURSE PRACTITIONER

## 2021-06-22 PROCEDURE — G8536 NO DOC ELDER MAL SCRN: HCPCS | Performed by: NURSE PRACTITIONER

## 2021-06-22 PROCEDURE — G8427 DOCREV CUR MEDS BY ELIG CLIN: HCPCS | Performed by: NURSE PRACTITIONER

## 2021-06-22 PROCEDURE — 3017F COLORECTAL CA SCREEN DOC REV: CPT | Performed by: NURSE PRACTITIONER

## 2021-06-22 RX ORDER — PREGABALIN 200 MG/1
200 CAPSULE ORAL 3 TIMES DAILY
Qty: 90 CAPSULE | Refills: 5 | Status: SHIPPED | OUTPATIENT
Start: 2021-06-22 | End: 2021-07-28

## 2021-06-22 RX ORDER — LIDOCAINE AND PRILOCAINE 25; 25 MG/G; MG/G
CREAM TOPICAL
Qty: 30 G | Refills: 2 | Status: SHIPPED | OUTPATIENT
Start: 2021-06-22 | End: 2021-10-04

## 2021-06-22 NOTE — PROGRESS NOTES
EMG results. .  Chief Complaint   Patient presents with    Follow-up    Results     EMG results     .   Visit Vitals  /60 (BP 1 Location: Left upper arm, BP Patient Position: Sitting)   Pulse 88   Resp 16   Ht 5' 4\" (1.626 m)   Wt 156 lb (70.8 kg)   SpO2 100%   BMI 26.78 kg/m²

## 2021-06-22 NOTE — PROGRESS NOTES
Prakash Eric is a 76 y.o. female who presents with the following  Chief Complaint   Patient presents with    Follow-up    Results     EMG results       HPI     FU for EMG results. Symptoms are better with Lyrica 150 mg   Has noticed positives and less symptoms overall during the day   Less numbness, tingling and pain  She has been very active and does notice worse at night but is tolerable. No falls, balance issues. She has used the topical cream and does want refills of that also    Wants to increase Lyrica. Working better then Gabapentin did   On some SSRI and psychiatry medications to try to steer away from those. No Known Allergies    Current Outpatient Medications   Medication Sig    pregabalin (LYRICA) 200 mg capsule Take 1 Capsule by mouth three (3) times daily. Max Daily Amount: 600 mg.    lidocaine-prilocaine (EMLA) topical cream APPLY TO AFFECTED AREA(S) AS NEEDED FOR PAIN    levothyroxine (SYNTHROID) 137 mcg tablet TAKE 1 TABLET BY MOUTH EVERY DAY    guanFACINE ER (INTUNIV) 4 mg Tb24 ER tablet Take 4 mg by mouth daily.  meloxicam (MOBIC) 15 mg tablet TAKE 1 TABLET BY MOUTH EVERY DAY    rOPINIRole (REQUIP) 4 mg tab TAB TAKE 1 TABLET BY MOUTH EVERY DAY    pantoprazole (PROTONIX) 40 mg tablet TAKE 1 TABLET BY MOUTH EVERY DAY    metFORMIN ER (GLUCOPHAGE XR) 500 mg tablet TAKE TWO TABLETS BY MOUTH EVERY MORNING AND 1 TABLET AT NIGHT    clonazePAM (KlonoPIN) 1 mg tablet TAKE 1 TAB BY MOUTH THREE (3) TIMES DAILY. MAX DAILY AMOUNT: 3 MG.  FreeStyle Yaquelin 14 Day Sensor kit APPLY 1 SENSOR EVERY 14 DAYS TO CHECK BLOOD SUGARS DX:E11.9    lamoTRIgine (LaMICtal) 25 mg tablet Take 200 mg by mouth daily.  albuterol (PROVENTIL HFA, VENTOLIN HFA, PROAIR HFA) 90 mcg/actuation inhaler Take 2 Puffs by inhalation every four (4) hours as needed for Wheezing.  losartan (COZAAR) 100 mg tablet TAKE 1 TABLET BY MOUTH EVERY DAY    lancets misc Use to test blood sugars once daily.  Dx:E11.9  flash glucose scanning reader ([employer] MICHAEL 14 DAY READER) [facility] Use to check blood sugars throughout the day    ONETOUCH ULTRA BLUE TEST STRIP strip TEST SUGARS ONCE DAILY AS DIRECTED    traZODone (DESYREL) 100 mg tablet TAKE 3 TABLETS BY MOUTH AT BEDTIME (Patient taking differently: Take 200 mg by mouth nightly.)    DAILY MULTI-VITAMIN PO Take 1 Tab by mouth daily.  methocarbamoL (Robaxin) 500 mg tablet Take 1 Tab by mouth three (3) times daily as needed for Muscle Spasm(s). (Patient not taking: Reported on 6/22/2021)    diclofenac (VOLTAREN) 1 % gel Apply  to affected area four (4) times daily. (Patient not taking: Reported on 6/22/2021)    simvastatin (ZOCOR) 10 mg tablet TAKE 1 TABLET BY MOUTH EVERY DAY AT NIGHT (Patient not taking: Reported on 6/22/2021)     No current facility-administered medications for this visit.        Social History     Tobacco Use   Smoking Status Never Smoker   Smokeless Tobacco Never Used       Past Medical History:   Diagnosis Date    ADD (attention deficit disorder) 8/17/2009    Breast cancer ([facility] 2012    Left Breast    Depressive disorder, not elsewhere classified 8/17/2009    Essential hypertension, benign 8/17/2009    History of mammogram 02/17/2020    BI-RADS Category 3    Hypertension     Pap smear for cervical cancer screening 07/28/2016; 01/09/2020    negative, HPV negative; negative, HPV negative    Psychiatric disorder 2007    panic attacks/PTSD    PTSD (post-traumatic stress disorder)     Thyroid disease     Type 2 diabetes mellitus without complication ([facility] 5/02/1202    Unspecified hypothyroidism 8/17/2009       Past Surgical History:   Procedure Laterality Date    HX BREAST LUMPECTOMY Left 2012    HX GYN      c section    HX HEENT  1992    partial thyroidectomy    HX TONSILLECTOMY      HX UROLOGICAL  1992    Bladder suspension    [facility] BREAST SURGERY PROCEDURE UNLISTED  January 2012    left breast biopsy       Family History   Problem Relation Age of Onset    Diabetes Mother     Cancer Mother         breast    Stroke Mother     Breast Cancer Mother 57        80 second time    Hypertension Father     Elevated Lipids Father     Migraines Father     Dementia Father        Social History     Socioeconomic History    Marital status:      Spouse name: Not on file    Number of children: Not on file    Years of education: Not on file    Highest education level: Not on file   Tobacco Use    Smoking status: Never Smoker    Smokeless tobacco: Never Used   Substance and Sexual Activity    Alcohol use: No     Alcohol/week: 0.0 standard drinks    Drug use: No    Sexual activity: Never     Partners: Male     Social Determinants of Health     Financial Resource Strain:     Difficulty of Paying Living Expenses:    Food Insecurity:     Worried About Running Out of Food in the Last Year:     Ran Out of Food in the Last Year:    Transportation Needs:     Lack of Transportation (Medical):  Lack of Transportation (Non-Medical):    Physical Activity:     Days of Exercise per Week:     Minutes of Exercise per Session:    Stress:     Feeling of Stress :    Social Connections:     Frequency of Communication with Friends and Family:     Frequency of Social Gatherings with Friends and Family:     Attends Temple Services:     Active Member of Clubs or Organizations:     Attends Club or Organization Meetings:     Marital Status:        Review of Systems   Respiratory: Negative for shortness of breath and wheezing. Cardiovascular: Negative for chest pain and palpitations. Musculoskeletal: Negative for falls. Neurological: Positive for tingling and sensory change. Negative for dizziness, tremors, loss of consciousness, weakness and headaches. Remainder of comprehensive review is negative.      Physical Exam :    Visit Vitals  /60 (BP 1 Location: Left upper arm, BP Patient Position: Sitting)   Pulse 88   Resp 16   Ht 5' 4\" (1.626 m)   Wt 70.8 kg (156 lb)   SpO2 100%   BMI 26.78 kg/m²         Results for orders placed or performed in visit on 02/09/21   HEMOGLOBIN A1C WITH EAG   Result Value Ref Range    Hemoglobin A1c 6.5 (H) 4.0 - 5.6 %    Est. average glucose 140 mg/dL   LIPID PANEL   Result Value Ref Range    LIPID PROFILE          Cholesterol, total 237 (H) <200 MG/DL    Triglyceride 154 (H) <150 MG/DL    HDL Cholesterol 82 MG/DL    LDL, calculated 124.2 (H) 0 - 100 MG/DL    VLDL, calculated 30.8 MG/DL    CHOL/HDL Ratio 2.9 0.0 - 5.0     METABOLIC PANEL, COMPREHENSIVE   Result Value Ref Range    Sodium 134 (L) 136 - 145 mmol/L    Potassium 4.4 3.5 - 5.1 mmol/L    Chloride 102 97 - 108 mmol/L    CO2 23 21 - 32 mmol/L    Anion gap 9 5 - 15 mmol/L    Glucose 152 (H) 65 - 100 mg/dL    BUN 23 (H) 6 - 20 MG/DL    Creatinine 0.81 0.55 - 1.02 MG/DL    BUN/Creatinine ratio 28 (H) 12 - 20      GFR est AA >60 >60 ml/min/1.73m2    GFR est non-AA >60 >60 ml/min/1.73m2    Calcium 9.7 8.5 - 10.1 MG/DL    Bilirubin, total 0.3 0.2 - 1.0 MG/DL    ALT (SGPT) 27 12 - 78 U/L    AST (SGOT) 16 15 - 37 U/L    Alk. phosphatase 107 45 - 117 U/L    Protein, total 7.7 6.4 - 8.2 g/dL    Albumin 4.1 3.5 - 5.0 g/dL    Globulin 3.6 2.0 - 4.0 g/dL    A-G Ratio 1.1 1.1 - 2.2         Orders Placed This Encounter    pregabalin (LYRICA) 200 mg capsule     Sig: Take 1 Capsule by mouth three (3) times daily. Max Daily Amount: 600 mg. Dispense:  90 Capsule     Refill:  5    lidocaine-prilocaine (EMLA) topical cream     Sig: APPLY TO AFFECTED AREA(S) AS NEEDED FOR PAIN     Dispense:  30 g     Refill:  2       1. Diabetic polyneuropathy associated with type 2 diabetes mellitus ([address]        Discussed EMG in full   Discussed lifestyle risk factors   Discussed skin biopsy- she will defer for now and let us know if she wants to pursue    Increase Lyrica to 200 mg TID for symptom management. She noticed this worked really well at lower dose.    Discussed 6-8 weeks to see another change  Refill cream for topical use    Stay active.              This note will not be viewable in MyChart

## 2021-07-06 DIAGNOSIS — E11.9 TYPE 2 DIABETES MELLITUS WITHOUT COMPLICATION, WITHOUT LONG-TERM CURRENT USE OF INSULIN (HCC): ICD-10-CM

## 2021-07-06 RX ORDER — METFORMIN HYDROCHLORIDE 500 MG/1
TABLET, EXTENDED RELEASE ORAL
Qty: 270 TABLET | Refills: 1 | Status: SHIPPED | OUTPATIENT
Start: 2021-07-06 | End: 2022-01-05 | Stop reason: SDUPTHER

## 2021-07-10 DIAGNOSIS — I10 ESSENTIAL HYPERTENSION, BENIGN: Chronic | ICD-10-CM

## 2021-07-10 RX ORDER — LOSARTAN POTASSIUM 100 MG/1
TABLET ORAL
Qty: 90 TABLET | Refills: 1 | Status: SHIPPED | OUTPATIENT
Start: 2021-07-10 | End: 2022-03-24 | Stop reason: SDUPTHER

## 2021-07-28 DIAGNOSIS — E11.42 DIABETIC POLYNEUROPATHY ASSOCIATED WITH TYPE 2 DIABETES MELLITUS (HCC): ICD-10-CM

## 2021-07-28 RX ORDER — PREGABALIN 200 MG/1
200 CAPSULE ORAL 3 TIMES DAILY
Qty: 90 CAPSULE | Refills: 5 | Status: SHIPPED | OUTPATIENT
Start: 2021-07-28 | End: 2021-10-04

## 2021-08-02 RX ORDER — DULAGLUTIDE 0.75 MG/.5ML
0.75 INJECTION, SOLUTION SUBCUTANEOUS
Qty: 2 SYRINGE | Refills: 2 | Status: SHIPPED | OUTPATIENT
Start: 2021-08-02 | End: 2021-09-01

## 2021-08-04 ENCOUNTER — TELEPHONE (OUTPATIENT)
Dept: INTERNAL MEDICINE CLINIC | Age: 66
End: 2021-08-04

## 2021-08-04 DIAGNOSIS — Z85.3 PERSONAL HISTORY OF BREAST CANCER: ICD-10-CM

## 2021-08-04 DIAGNOSIS — Z80.3 FH: BREAST CANCER: Primary | ICD-10-CM

## 2021-08-04 NOTE — TELEPHONE ENCOUNTER
[facility] is calling to set up her diagnostic Mammogram done but she does not have an order yet - we can fax this or put it in patients chart.  The fax number is: [phone]

## 2021-08-04 NOTE — TELEPHONE ENCOUNTER
Spoke with patient and she states that she needs her yearly breast cancer check but because of her personal history she wasn't sure if it needs to be a diagnostic or which imaging she needs.  Orders placed in system by Dr. Tucker Martinez

## 2021-08-17 ENCOUNTER — TRANSCRIBE ORDER (OUTPATIENT)
Dept: MAMMOGRAPHY | Age: 66
End: 2021-08-17

## 2021-08-17 ENCOUNTER — HOSPITAL ENCOUNTER (OUTPATIENT)
Dept: MAMMOGRAPHY | Age: 66
Discharge: HOME | End: 2021-08-17
Attending: INTERNAL MEDICINE
Payer: MEDICARE

## 2021-08-17 ENCOUNTER — TELEPHONE (OUTPATIENT)
Dept: INTERNAL MEDICINE CLINIC | Age: 66
End: 2021-08-17

## 2021-08-17 DIAGNOSIS — N64.89 BREAST ASYMMETRY: ICD-10-CM

## 2021-08-17 DIAGNOSIS — Z12.31 VISIT FOR SCREENING MAMMOGRAM: Primary | ICD-10-CM

## 2021-08-17 DIAGNOSIS — Z85.3 PERSONAL HISTORY OF BREAST CANCER: ICD-10-CM

## 2021-08-17 DIAGNOSIS — N64.89 BREAST ASYMMETRY: Primary | ICD-10-CM

## 2021-08-17 DIAGNOSIS — Z80.3 FH: BREAST CANCER: ICD-10-CM

## 2021-08-17 DIAGNOSIS — Z12.31 VISIT FOR SCREENING MAMMOGRAM: ICD-10-CM

## 2021-08-17 PROCEDURE — 77065 DX MAMMO INCL CAD UNI: CPT

## 2021-08-17 PROCEDURE — 77067 SCR MAMMO BI INCL CAD: CPT

## 2021-08-17 PROCEDURE — 76642 ULTRASOUND BREAST LIMITED: CPT

## 2021-08-17 NOTE — TELEPHONE ENCOUNTER
Needs a right breast biopsy. An order has been faxed by Lavelle Abdullahi from [address] [place] [address] to be signed by the doctor.

## 2021-08-18 ENCOUNTER — OFFICE VISIT (OUTPATIENT)
Dept: NEUROLOGY | Age: 66
End: 2021-08-18
Payer: MEDICARE

## 2021-08-18 VITALS
BODY MASS INDEX: 26.78 KG/M2 | OXYGEN SATURATION: 95 % | WEIGHT: 156 LBS | DIASTOLIC BLOOD PRESSURE: 66 MMHG | SYSTOLIC BLOOD PRESSURE: 127 MMHG | RESPIRATION RATE: 16 BRPM | HEART RATE: 86 BPM

## 2021-08-18 DIAGNOSIS — R20.2 PARESTHESIA: Primary | ICD-10-CM

## 2021-08-18 PROCEDURE — 1101F PT FALLS ASSESS-DOCD LE1/YR: CPT | Performed by: PSYCHIATRY & NEUROLOGY

## 2021-08-18 PROCEDURE — G8427 DOCREV CUR MEDS BY ELIG CLIN: HCPCS | Performed by: PSYCHIATRY & NEUROLOGY

## 2021-08-18 PROCEDURE — G8432 DEP SCR NOT DOC, RNG: HCPCS | Performed by: PSYCHIATRY & NEUROLOGY

## 2021-08-18 PROCEDURE — G8536 NO DOC ELDER MAL SCRN: HCPCS | Performed by: PSYCHIATRY & NEUROLOGY

## 2021-08-18 PROCEDURE — G9899 SCRN MAM PERF RSLTS DOC: HCPCS | Performed by: PSYCHIATRY & NEUROLOGY

## 2021-08-18 PROCEDURE — 99214 OFFICE O/P EST MOD 30 MIN: CPT | Performed by: PSYCHIATRY & NEUROLOGY

## 2021-08-18 PROCEDURE — G8400 PT W/DXA NO RESULTS DOC: HCPCS | Performed by: PSYCHIATRY & NEUROLOGY

## 2021-08-18 PROCEDURE — 1090F PRES/ABSN URINE INCON ASSESS: CPT | Performed by: PSYCHIATRY & NEUROLOGY

## 2021-08-18 PROCEDURE — G8752 SYS BP LESS 140: HCPCS | Performed by: PSYCHIATRY & NEUROLOGY

## 2021-08-18 PROCEDURE — 3017F COLORECTAL CA SCREEN DOC REV: CPT | Performed by: PSYCHIATRY & NEUROLOGY

## 2021-08-18 PROCEDURE — G8419 CALC BMI OUT NRM PARAM NOF/U: HCPCS | Performed by: PSYCHIATRY & NEUROLOGY

## 2021-08-18 PROCEDURE — G8754 DIAS BP LESS 90: HCPCS | Performed by: PSYCHIATRY & NEUROLOGY

## 2021-08-18 RX ORDER — SERTRALINE HYDROCHLORIDE 50 MG/1
50 TABLET, FILM COATED ORAL DAILY
COMMUNITY
Start: 2021-07-31 | End: 2022-03-21

## 2021-08-18 NOTE — PROGRESS NOTES
[facility]rology [facility] and [address] at [facility] Neurology Clinics at [address] [place] [address], [address] [facility] [address] [facility], [address] [place] [address]   [phone]              Chief Complaint   Patient presents with    Peripheral Neuropathy     feels like I am walking on roller balls since emg     Current Outpatient Medications   Medication Sig Dispense Refill    Trulicity 0.20 PO/3.2 mL sub-q pen 0.5 ML BY SUBCUTANEOUS ROUTE EVERY SEVEN (7) DAYS FOR 30 DAYS. 2 Syringe 2    pregabalin (LYRICA) 200 mg capsule TAKE 1 CAPSULE BY MOUTH THREE (3) TIMES DAILY. MAX DAILY AMOUNT: 600 MG. 90 Capsule 5    losartan (COZAAR) 100 mg tablet TAKE 1 TABLET BY MOUTH EVERY DAY 90 Tablet 1    metFORMIN ER (GLUCOPHAGE XR) 500 mg tablet TAKE TWO TABLETS BY MOUTH EVERY MORNING AND 1 TABLET AT NIGHT 270 Tablet 1    levothyroxine (SYNTHROID) 137 mcg tablet TAKE 1 TABLET BY MOUTH EVERY DAY 90 Tablet 2    guanFACINE ER (INTUNIV) 4 mg Tb24 ER tablet Take 4 mg by mouth daily.  meloxicam (MOBIC) 15 mg tablet TAKE 1 TABLET BY MOUTH EVERY DAY 90 Tab 1    pantoprazole (PROTONIX) 40 mg tablet TAKE 1 TABLET BY MOUTH EVERY DAY 90 Tab 1    clonazePAM (KlonoPIN) 1 mg tablet TAKE 1 TAB BY MOUTH THREE (3) TIMES DAILY. MAX DAILY AMOUNT: 3 MG. 90 Tab 0    FreeStyle Yaquelin 14 Day Sensor kit APPLY 1 SENSOR EVERY 14 DAYS TO CHECK BLOOD SUGARS DX:E11.9 2 Kit 3    lamoTRIgine (LaMICtal) 200 mg tablet Take 200 mg by mouth daily.  albuterol (PROVENTIL HFA, VENTOLIN HFA, PROAIR HFA) 90 mcg/actuation inhaler Take 2 Puffs by inhalation every four (4) hours as needed for Wheezing.  1 Inhaler 1    simvastatin (ZOCOR) 10 mg tablet TAKE 1 TABLET BY MOUTH EVERY DAY AT NIGHT 90 Tab 0    flash glucose scanning reader (FREESTYLE YAQUELIN 14 DAY READER) [facility] Use to check blood sugars throughout the day 1 Each 0    traZODone (DESYREL) 100 mg tablet TAKE 3 TABLETS BY MOUTH AT BEDTIME (Patient taking differently: Take 200 mg by mouth nightly.) 270 Tab 1    DAILY MULTI-VITAMIN PO Take 1 Tab by mouth daily.  sertraline (ZOLOFT) 50 mg tablet Take 50 mg by mouth daily.  lidocaine-prilocaine (EMLA) topical cream APPLY TO AFFECTED AREA(S) AS NEEDED FOR PAIN (Patient not taking: Reported on 8/18/2021) 30 g 2    methocarbamoL (Robaxin) 500 mg tablet Take 1 Tab by mouth three (3) times daily as needed for Muscle Spasm(s). (Patient not taking: Reported on 6/22/2021) 10 Tab 0    diclofenac (VOLTAREN) 1 % gel Apply  to affected area four (4) times daily. (Patient not taking: Reported on 6/22/2021) 50 g 0    rOPINIRole (REQUIP) 4 mg tab TAB TAKE 1 TABLET BY MOUTH EVERY DAY (Patient not taking: Reported on 8/18/2021) 90 Tab 0    lancets misc Use to test blood sugars once daily. Dx:E11.9 100 Each 3    ONETOUCH ULTRA BLUE TEST STRIP strip TEST SUGARS ONCE DAILY AS DIRECTED 50 Strip 0      No Known Allergies  Social History     Tobacco Use    Smoking status: Never Smoker    Smokeless tobacco: Never Used   Substance Use Topics    Alcohol use: No     Alcohol/week: 0.0 standard drinks    Drug use: No     59-year-old lady returns today for follow-up. She last saw our nurse practitioner, Jonatan June 22, 2021. She was following up for her EMG result. EMG done by one of our neuromuscular experts on Kayleigh 10, 2021 demonstrated normal study. No evidence of peripheral neuropathy of large fiber type. No evidence of radiculopathy. At her initial visit given her history of diabetes and dysesthesias I felt was likely peripheral neuropathy so we gave her some Lyrica. When she saw Marilu Hernandez he continued her Lyrica and increase that a bit just for symptomatic relief. She deferred skin biopsy. He gave her some EMLA cream.  Lyrica was increased to a dose of 200 mg 3 times daily    Today she reports symptoms have gotten worse. Lyrica is not effective.   Using some B12 and thinks that may help a bit.    Record review finds he had an abnormal mammogram yesterday. She was referred to [place] [facility]. Ultrasound of the breast was ordered. Last hemoglobin A1c 6.1 October 15, 2020    Examination  Visit Vitals  /66 (BP 1 Location: Left upper arm, BP Patient Position: Sitting, BP Cuff Size: Adult)   Pulse 86   Resp 16   Wt 70.8 kg (156 lb)   SpO2 95%   BMI 26.78 kg/m²   Pleasant lady. Awake alert and oriented. Normal    Impression/Plan  Dysesthesias with no evidence of neuropathy large fiber type on EMG and worsening symptoms  We will get skin biopsy and ANS testing to look for small fiber neuropathy  Abnormal mammogram with breast biopsy planned as above and a small fiber may have some bearing if positive biopsy  Follow-up after the above      Matilde Malik MD        This note was created using voice recognition software. Despite editing, there may be syntax errors.

## 2021-08-18 NOTE — PROGRESS NOTES
Chief Complaint   Patient presents with    Peripheral Neuropathy     feels like I am walking on roller balls since emg     Would like to try nortriptyline as pregabalin has lost efficacy

## 2021-08-23 ENCOUNTER — HOSPITAL ENCOUNTER (OUTPATIENT)
Dept: LAB | Age: 66
Discharge: HOME | End: 2021-08-23

## 2021-08-23 ENCOUNTER — DOCUMENTATION ONLY (OUTPATIENT)
Dept: SURGERY | Age: 66
End: 2021-08-23

## 2021-08-23 ENCOUNTER — OFFICE VISIT (OUTPATIENT)
Dept: SURGERY | Age: 66
End: 2021-08-23
Payer: MEDICARE

## 2021-08-23 VITALS
BODY MASS INDEX: 26.49 KG/M2 | HEIGHT: 65 IN | HEART RATE: 76 BPM | DIASTOLIC BLOOD PRESSURE: 65 MMHG | WEIGHT: 159 LBS | SYSTOLIC BLOOD PRESSURE: 130 MMHG

## 2021-08-23 DIAGNOSIS — Z85.3 HISTORY OF LEFT BREAST CANCER: ICD-10-CM

## 2021-08-23 DIAGNOSIS — R92.8 ABNORMAL ULTRASOUND OF BREAST: ICD-10-CM

## 2021-08-23 DIAGNOSIS — R92.8 ABNORMAL MAMMOGRAM: Primary | ICD-10-CM

## 2021-08-23 DIAGNOSIS — N63.10 BREAST MASS, RIGHT: Primary | ICD-10-CM

## 2021-08-23 PROCEDURE — G8427 DOCREV CUR MEDS BY ELIG CLIN: HCPCS | Performed by: SURGERY

## 2021-08-23 PROCEDURE — G8754 DIAS BP LESS 90: HCPCS | Performed by: SURGERY

## 2021-08-23 PROCEDURE — G8536 NO DOC ELDER MAL SCRN: HCPCS | Performed by: SURGERY

## 2021-08-23 PROCEDURE — 1101F PT FALLS ASSESS-DOCD LE1/YR: CPT | Performed by: SURGERY

## 2021-08-23 PROCEDURE — G8419 CALC BMI OUT NRM PARAM NOF/U: HCPCS | Performed by: SURGERY

## 2021-08-23 PROCEDURE — G9899 SCRN MAM PERF RSLTS DOC: HCPCS | Performed by: SURGERY

## 2021-08-23 PROCEDURE — 3017F COLORECTAL CA SCREEN DOC REV: CPT | Performed by: SURGERY

## 2021-08-23 PROCEDURE — 99203 OFFICE O/P NEW LOW 30 MIN: CPT | Performed by: SURGERY

## 2021-08-23 PROCEDURE — G8432 DEP SCR NOT DOC, RNG: HCPCS | Performed by: SURGERY

## 2021-08-23 PROCEDURE — 19083 BX BREAST 1ST LESION US IMAG: CPT | Performed by: SURGERY

## 2021-08-23 PROCEDURE — G8400 PT W/DXA NO RESULTS DOC: HCPCS | Performed by: SURGERY

## 2021-08-23 PROCEDURE — 1090F PRES/ABSN URINE INCON ASSESS: CPT | Performed by: SURGERY

## 2021-08-23 PROCEDURE — G8752 SYS BP LESS 140: HCPCS | Performed by: SURGERY

## 2021-08-23 NOTE — LETTER
8/27/2021 8:39 AM    Patient:  Duncan Milton   YOB: 1955  Date of Visit: 8/23/2021      Dear Dr. Thad Morales:      Thank you for referring Ms. Leonard Parish to me for evaluation/treatment. Below are the relevant portions of my assessment and plan of care. If you have questions, please do not hesitate to call me. I look forward to following Ms. Sykes along with you.         Sincerely,      Krista Patrick MD

## 2021-08-23 NOTE — PROGRESS NOTES
HISTORY OF PRESENT ILLNESS  Mattie Corea is a 76 y.o. female. HPI NEW patient referral for consultation by Dr. Manan Henriquez for an abnormal mammogram. The patient denies any palpable lumps, nipple inversion or discharge. The patient has a history of LEFT breast cancer in 2012 and is S/P LEFT lumpectomy and completed radiation. Family History - Mother had breast cancer at age 58 and recurrence age 80 and passed away. [facility] Results (most recent):  Results from [place] encounter on 08/17/21    SARAY 3D NATHANIEL W MAMMO RT DX SAME DAY INCL CAD    Narrative  SARAY 3D NATHANIEL W MAMMO RT DX SAME DAY INCL CAD, US BREAST RT LIMITED=<3 QUAD  INDICATION: . Other specified disorders of breast.  COMPARISON: Mammogram, earlier same day  . TECHNIQUE:  Whole breast ML and spot compression views of the right breast were performed on  a digital system with computer-aided detection. Tomosynthesis of the right  breast was performed in ML and spot compression projections. Targeted ultrasound  of the right breast was also performed. .  COMPOSITION: Mammographically, the breast tissue is with scattered  fibroglandular densities. Jazlyn Rued FINDINGS:  Persistent distortion/asymmetry in the lower, outer quadrant of the right  breast, middle 3rd. .  Targeted right breast ultrasound: There is an ill-defined, heterogeneous lesion  in the right breast at 7 o'clock, 5 cm from nipple which measures 0.6 x 0.8 x  0.8 cm  . Impression  1. Irregular masslike lesion in the right breast at 7 o'clock which corresponds  to the mammographic finding. BI-RADS Category 4 - Suspicious abnormality. .  RECOMMENDATION:  Ultrasound-guided core needle biopsy. .  The findings of this exam and the recommendation were discussed with the patient  at the time of exam by myself. Review of Systems   Constitutional: Negative. HENT: Negative. Eyes: Negative. Respiratory: Negative. Cardiovascular: Negative. Gastrointestinal: Negative. Genitourinary: Negative. Musculoskeletal: Negative. Skin: Negative. Neurological: Negative. Endo/Heme/Allergies: Negative. Psychiatric/Behavioral: The patient is nervous/anxious.         Physical Exam    ASSESSMENT and PLAN  {ASSESSMENT/PLAN:73044}

## 2021-08-23 NOTE — PATIENT INSTRUCTIONS
Core Needle Breast Biopsy: About This Test  What is it? A core needle breast biopsy removes samples of breast tissue using a needle with a special tip. The samples are looked at under a microscope to check for cancer cells. Why is this test done? A core needle breast biopsy is most often done to check a lump found during a breast exam or a suspicious area found on a mammogram or other imaging. How do you prepare for the test?  If you take aspirin or some other blood thinner, ask your doctor if you should stop taking it before your test. Make sure that you understand exactly what your doctor wants you to do. These medicines increase the risk of bleeding. How is the test done? · You may sit in a chair or lie face up on a table. Or you may lie on your stomach on a table that has a hole for your breast to hang through. · When the area in your breast is numb, a small cut (incision) is made in the skin. · Using imaging, the doctor will guide the needle into the biopsy area. · The doctor will take several samples of breast tissue. This may be done with the needle or with a probe that uses a gentle vacuum to remove the samples. · A small clip is usually inserted into your breast to mango the biopsy site. · The needle is removed and pressure is put on the needle site to stop any bleeding. · A bandage is put on the needle site. How long does the test take? The test may take 15 minutes to 60 minutes, depending on how the procedure is done. What happens after the test?  · You'll be told how long it may take to get your results back. · You will probably be able to go home right away. · After a specialist looks at the biopsy sample for signs of cancer, your doctor's office will let you know the results. · If the test results aren't clear, you may have another biopsy or test.  How can you care for yourself at home? · You can go back to your usual activities right away.  But avoid heavy lifting for 24 hours.  · The site may be tender for 2 or 3 days. You may also have some bruising, swelling, or slight bleeding. ? You can use an ice pack. Put ice or a cold pack on the area for 10 to 20 minutes at a time. Put a thin cloth between the ice and your skin. ? Ask your doctor if you can take an over-the-counter pain medicine, such as acetaminophen (Tylenol), ibuprofen (Advil, Motrin), or naproxen (Aleve). Be safe with medicines. Read and follow all instructions on the label. Follow-up care is a key part of your treatment and safety. Be sure to make and go to all appointments, and call your doctor if you are having problems. It's also a good idea to keep a list of the medicines you take. Ask your doctor when you can expect to have your test results. Where can you learn more? Go to http://www.gray.com/  Enter Y286 in the search box to learn more about \"Core Needle Breast Biopsy: About This Test.\"  Current as of: December 17, 2020               Content Version: 12.8  © 4265-6818 [employer]. Care instructions adapted under license by [employer] (which disclaims liability or warranty for this information). If you have questions about a medical condition or this instruction, always ask your healthcare professional. [address] any warranty or liability for your use of this information.

## 2021-08-23 NOTE — PROGRESS NOTES
HISTORY OF PRESENT ILLNESS  Consuelo Moreland is a 76 y.o. female. HPI  NEW patient referral for consultation by Dr. Ivory Sullivan for an abnormal RIGHT breast mammogram. The patient denies any palpable lumps, nipple inversion or discharge. 2012: LEFT breast cancer, treated with lumpectomy radiation. Family History - Mother had breast cancer at age 58 and recurrence age 80 and passed away. [facility] Results (most recent):  Results from [place] encounter on 08/17/21     SARAY 3D NATHANIEL W MAMMO RT DX SAME DAY INCL CAD     Narrative  SARAY 3D NATHANIEL W MAMMO RT DX SAME DAY INCL CAD, US BREAST RT LIMITED=<3 QUAD  INDICATION: . Other specified disorders of breast.  COMPARISON: Mammogram, earlier same day  . TECHNIQUE:  Whole breast ML and spot compression views of the right breast were performed on  a digital system with computer-aided detection. Tomosynthesis of the right  breast was performed in ML and spot compression projections. Targeted ultrasound  of the right breast was also performed. .  COMPOSITION: Mammographically, the breast tissue is with scattered  fibroglandular densities. Colton Tristan FINDINGS:  Persistent distortion/asymmetry in the lower, outer quadrant of the right  breast, middle 3rd. .  Targeted right breast ultrasound: There is an ill-defined, heterogeneous lesion  in the right breast at 7 o'clock, 5 cm from nipple which measures 0.6 x 0.8 x  0.8 cm  .     Impression  1. Irregular masslike lesion in the right breast at 7 o'clock which corresponds  to the mammographic finding. BI-RADS Category 4 - Suspicious abnormality. .  RECOMMENDATION:  Ultrasound-guided core needle biopsy. .  The findings of this exam and the recommendation were discussed with the patient  at the time of exam by myself. ROS  Constitutional: Negative. HENT: Negative. Eyes: Negative. Respiratory: Negative. Cardiovascular: Negative. Gastrointestinal: Negative. Genitourinary: Negative.     Musculoskeletal: Negative. Skin: Negative. Neurological: Negative. Endo/Heme/Allergies: Negative. Psychiatric/Behavioral: The patient is nervous/anxious. Physical Exam  Exam conducted with a chaperone present. Cardiovascular:      Rate and Rhythm: Normal rate and regular rhythm. Heart sounds: Normal heart sounds. Pulmonary:      Breath sounds: Normal breath sounds. Chest:      Breasts: Breasts are symmetrical.         Right: Normal. No swelling, bleeding, inverted nipple, mass, nipple discharge, skin change or tenderness. Left: Normal. No swelling, bleeding, inverted nipple, mass, nipple discharge, skin change or tenderness. Lymphadenopathy:      Cervical:      Right cervical: No superficial, deep or posterior cervical adenopathy. Left cervical: No superficial, deep or posterior cervical adenopathy. Upper Body:      Right upper body: No supraclavicular or axillary adenopathy. Left upper body: No supraclavicular or axillary adenopathy. BREAST ULTRASOUND  Indication: RIGHT breast abnormal mammogram  Technique: The area was scanned using a high-frequency linear-array near-field transducer  Findings: 7mm mass, LOQ  Impression: Suspicious mass  Disposition: Ultrasound-guided biopsy performed    US-GUIDED CORE BIOPSY  Following detailed explanation and description of the biopsy procedure, its risk, benefits and possible alternatives, the patient signed the informed consent. Indication: Mass, Ultrasound Visible, RIGHT breast LOQ   Prep: We cleansed the skin with alcohol. Anesthesia: We anesthetized the skin and underlying tissues with 1% lidocaine with epinephrine. Device: We advanced the BARD Marquee device through the lesion and captured tissue with real-time ultrasound confirmation. Core Sampling: We repeated this sampling for the following number of cores, 3. Marker: We placed a marking clip to mango the biopsy site. Marker Type: HydroMARK. Dressing:  We then closed the incision with steristrips and placed a sterile dressing. Instructions: The patient was instructed regarding post-procedure care and activities. Pathology: Pending at this time. Patient tolerated procedure well and discharged in stable condition. Informed patient that they will be notified of pathology results in 3 to 5 days. ASSESSMENT and PLAN    ICD-10-CM ICD-9-CM    1. Breast mass, right  N63.10 611.72    2. Abnormal ultrasound of breast  R92.8 793.89    3. History of left breast cancer  Z85.3 V10.3       New patient presents for evaluation of RIGHT breast mass, and is doing well overall. No palpable mass on exam. RIGHT breast US visualizes 7mm mass at LOQ. Discussed bx of this mass and pt agreed to proceed. She tolerated the procedure well, and 3 cores were taken, [facility] marker placed. Will send bx specimens for PATH and f/u with results. This plan was reviewed with the patient and patient agrees. All questions were answered. Total time spent was 30 minutes.     Written by Kemi Martinez, as dictated by Dr. Linn Valenzuela MD.

## 2021-08-23 NOTE — PROGRESS NOTES
[facility]  OFFICE PROCEDURE PROGRESS NOTE        Chart reviewed for the following:   I, Dr. Myrna Ceron, have reviewed the History, Physical and updated the Allergic reactions for [facility] performed immediately prior to start of procedure:   IDr. Myrna, have performed the following reviews on [address] [place] [address] prior to the start of the procedure:            * Patient was identified by name and date of birth   * Agreement on procedure being performed was verified  * Risks and Benefits explained to the patient  * Procedure site verified and marked as necessary  * Patient was positioned for comfort  * Consent was signed and verified     Time:1000am      Date of procedure: 8/23/2021    Procedure performed by:  Deidre Shultz MD    Provider assisted by: Michelle Wan RN    Patient assisted by: Michelle Wan RN    How tolerated by patient: Patient tolerated the procedure well without complications. Denies pain post biopsy. Post Procedural Pain Scale: 0 none    Comments: Patient tolerated the procedure well without complications. Denies pain post biopsy.

## 2021-08-24 ENCOUNTER — TELEPHONE (OUTPATIENT)
Dept: SURGERY | Age: 66
End: 2021-08-24

## 2021-08-25 ENCOUNTER — NURSE NAVIGATOR (OUTPATIENT)
Dept: CASE MANAGEMENT | Age: 66
End: 2021-08-25

## 2021-08-25 DIAGNOSIS — C50.911 BREAST CANCER, STAGE 1, RIGHT (HCC): Primary | ICD-10-CM

## 2021-08-25 DIAGNOSIS — Z85.3 HISTORY OF LEFT BREAST CANCER: ICD-10-CM

## 2021-08-25 NOTE — NURSE NAVIGATOR
[address]  Breast Navigator Encounter    Name: Nima Rodríguez  Age: 76 y.o.  : 1955  Diagnosis: Right breast ILC; receptors pending    Encounter type:  []Patient Initiated  []Navigator Follow-up []Pre-op  []Post-op  []Check-in Prior to First Treatment []Treatment Modality Change  []Survivorship Transition [x]Other: Introductory    Narrative:   Referred by Dr. Kacie Bob to assist in moving up scheduled MRI d/t recent breast cancer diagnosis. Appointment rescheduled from  to . Pt called to discuss MRI as well as to introduce Navigation and assess barriers to care. Pt doing well, however understandably upset by new diagnosis. She states she had breast cancer in the left breast in  so she knows what's ahead of her. Supportive listening provided as well as invited her to the [facility] breast cancer support group. She declines at this time stating she has a large support group of family and friends. She is the primary caregiver for her , Cari Granados, who lives with [place], however she states he currently needs minimal assistance. She verbalizes having resources when more support is needed in his care. No concerns surrounding insurance or financial assistance at this time. She confirms MRI appointment for  at 2:00pm with a 1:30pm arrival at [facility]. E-mail verified as [email or web address] and sent pt Navigator handout with contact info. Encouraged pt to reach out as needed. Will follow-up.      Referrals/Handouts:  Breast Navigator handout with contact info    Interdisciplinary Team:  Med-Onc:  Surg-Onc: Dr. Cathy Giron:  Plastics:  :   Nurse Navigator:  Elidia Fothergill, BSN, RN, GRETCHEN GrahamN, RN, [facility]  Oncology Breast Navigator    [address]  [address] [place] [address]  W: [phone]  F: [phone]  [email or web address]   Good Help to Those in [facility]

## 2021-08-26 DIAGNOSIS — N63.10 BREAST MASS, RIGHT: Primary | ICD-10-CM

## 2021-08-26 RX ORDER — ALPRAZOLAM 0.5 MG/1
0.5 TABLET ORAL
Qty: 12 TABLET | Refills: 0 | Status: SHIPPED | OUTPATIENT
Start: 2021-08-26 | End: 2021-10-04

## 2021-08-29 RX ORDER — ROPINIROLE 4 MG/1
TABLET, FILM COATED ORAL
Qty: 90 TABLET | Refills: 0 | Status: SHIPPED | OUTPATIENT
Start: 2021-08-29 | End: 2021-10-04

## 2021-08-31 ENCOUNTER — HOSPITAL ENCOUNTER (OUTPATIENT)
Dept: MRI IMAGING | Age: 66
Discharge: HOME | End: 2021-08-31
Payer: MEDICARE

## 2021-08-31 ENCOUNTER — NURSE NAVIGATOR (OUTPATIENT)
Dept: CASE MANAGEMENT | Age: 66
End: 2021-08-31

## 2021-08-31 DIAGNOSIS — Z85.3 HISTORY OF LEFT BREAST CANCER: ICD-10-CM

## 2021-08-31 DIAGNOSIS — C50.911 BREAST CANCER, STAGE 1, RIGHT (HCC): ICD-10-CM

## 2021-08-31 LAB — CREAT BLD-MCNC: 1 MG/DL (ref 0.6–1.3)

## 2021-08-31 PROCEDURE — 77049 MRI BREAST C-+ W/CAD BI: CPT

## 2021-08-31 PROCEDURE — A9585 GADOBUTROL INJECTION: HCPCS

## 2021-08-31 PROCEDURE — 82565 ASSAY OF CREATININE: CPT

## 2021-08-31 PROCEDURE — 74011250636 HC RX REV CODE- 250/636

## 2021-08-31 NOTE — NURSE NAVIGATOR
[address]  Breast Navigator Encounter    Name: Benjamin De León  Age: 76 y.o.  : 1955  Diagnosis: Right breast ILC; receptors pending    Encounter type:  [x]Patient Initiated  []Navigator Follow-up []Pre-op  []Post-op  []Check-in Prior to First Treatment []Treatment Modality Change  []Survivorship Transition []Other:     Narrative:   Pt called with concerns r/t diarrhea and upcoming MRI. Suggested immodium, however pt states trying Pepto bismol which is helping. Answered questions about MRI and prescribed anti-anxiety medications. Encouraged her to reach out as needed.          GRETCHEN WolffN, RN, [facility]  Oncology Breast Navigator    [address]  [address]  W: [phone]  F: [phone]  [email or web address]   Good Help to Those in [facility]

## 2021-09-01 ENCOUNTER — TELEPHONE (OUTPATIENT)
Dept: SURGERY | Age: 66
End: 2021-09-01

## 2021-09-01 PROCEDURE — A9585 GADOBUTROL INJECTION: HCPCS

## 2021-09-01 PROCEDURE — 74011250636 HC RX REV CODE- 250/636

## 2021-09-01 RX ADMIN — GADOBUTROL 7 ML: 604.72 INJECTION INTRAVENOUS at 10:58

## 2021-09-13 ENCOUNTER — OFFICE VISIT (OUTPATIENT)
Dept: SURGERY | Age: 66
End: 2021-09-13
Payer: MEDICARE

## 2021-09-13 VITALS — WEIGHT: 159 LBS | HEIGHT: 65 IN | BODY MASS INDEX: 26.49 KG/M2

## 2021-09-13 DIAGNOSIS — Z85.3 HISTORY OF LEFT BREAST CANCER: ICD-10-CM

## 2021-09-13 DIAGNOSIS — N63.10 BREAST MASS, RIGHT: ICD-10-CM

## 2021-09-13 DIAGNOSIS — C50.911 INVASIVE LOBULAR CARCINOMA OF RIGHT BREAST IN FEMALE (HCC): Primary | ICD-10-CM

## 2021-09-13 PROCEDURE — G8419 CALC BMI OUT NRM PARAM NOF/U: HCPCS | Performed by: SURGERY

## 2021-09-13 PROCEDURE — 3017F COLORECTAL CA SCREEN DOC REV: CPT | Performed by: SURGERY

## 2021-09-13 PROCEDURE — 99215 OFFICE O/P EST HI 40 MIN: CPT | Performed by: SURGERY

## 2021-09-13 PROCEDURE — G8400 PT W/DXA NO RESULTS DOC: HCPCS | Performed by: SURGERY

## 2021-09-13 PROCEDURE — 1090F PRES/ABSN URINE INCON ASSESS: CPT | Performed by: SURGERY

## 2021-09-13 PROCEDURE — 1101F PT FALLS ASSESS-DOCD LE1/YR: CPT | Performed by: SURGERY

## 2021-09-13 PROCEDURE — G8536 NO DOC ELDER MAL SCRN: HCPCS | Performed by: SURGERY

## 2021-09-13 PROCEDURE — G8756 NO BP MEASURE DOC: HCPCS | Performed by: SURGERY

## 2021-09-13 PROCEDURE — G8432 DEP SCR NOT DOC, RNG: HCPCS | Performed by: SURGERY

## 2021-09-13 PROCEDURE — G9899 SCRN MAM PERF RSLTS DOC: HCPCS | Performed by: SURGERY

## 2021-09-13 PROCEDURE — G8427 DOCREV CUR MEDS BY ELIG CLIN: HCPCS | Performed by: SURGERY

## 2021-09-13 NOTE — PATIENT INSTRUCTIONS
Breast Cancer: Care Instructions  Your Care Instructions     Breast cancer occurs when abnormal cells grow out of control in the breast. These cancer cells can spread within the breast, to nearby lymph nodes and other tissues, and to other parts of the body. Being treated for cancer can weaken your body, and you may feel very tired. Get the rest your body needs so you can feel better. Finding out that you have cancer is scary. You may feel many emotions and may need some help coping. Seek out family, friends, and counselors for support. You also can do things at home to make yourself feel better while you go through treatment. Call the [employer] (1-568.847.1839) or visit its website at [employer] for more information. Follow-up care is a key part of your treatment and safety. Be sure to make and go to all appointments, and call your doctor if you are having problems. It's also a good idea to know your test results and keep a list of the medicines you take. How can you care for yourself at home? · Take your medicines exactly as prescribed. Call your doctor if you think you are having a problem with your medicine. You may get medicine for nausea and vomiting if you have these side effects. · Follow your doctor's instructions to relieve pain. Pain from cancer and surgery can almost always be controlled. Use pain medicine when you first notice pain, before it becomes severe. · Eat healthy food. If you do not feel like eating, try to eat food that has protein and extra calories to keep up your strength and prevent weight loss. Drink liquid meal replacements for extra calories and protein. Try to eat your main meal early. · Get some physical activity every day, but do not get too tired. Keep doing the hobbies you enjoy as your energy allows. · Do not smoke. Smoking can make your cancer worse. If you need help quitting, talk to your doctor about stop-smoking programs and medicines.  These can increase your chances of quitting for good. · Take steps to control your stress and workload. Learn relaxation techniques. ? Share your feelings. Stress and tension affect our emotions. By expressing your feelings to others, you may be able to understand and cope with them. ? Consider joining a support group. Talking about a problem with your spouse, a good friend, or other people with similar problems is a good way to reduce tension and stress. ? Express yourself through art. Try writing, crafts, dance, or art to relieve stress. Some dance, writing, or art groups may be available just for people who have cancer. ? Be kind to your body and mind. Getting enough sleep, eating a healthy diet, and taking time to do things you enjoy can contribute to an overall feeling of balance in your life and can help reduce stress. ? Get help if you need it. Discuss your concerns with your doctor or counselor. · If you are vomiting or have diarrhea:  ? Drink plenty of fluids to prevent dehydration. Choose water and other caffeine-free clear liquids. If you have kidney, heart, or liver disease and have to limit fluids, talk with your doctor before you increase the amount of fluids you drink. ? When you are able to eat, try clear soups, mild foods, and liquids until all symptoms are gone for 12 to 48 hours. Other good choices include dry toast, crackers, cooked cereal, and gelatin dessert, such as Jell-O.  · If you have not already done so, prepare a list of advance directives. Advance directives are instructions to your doctor and family members about what kind of care you want if you become unable to speak or express yourself. When should you call for help? Call 911 anytime you think you may need emergency care. For example, call if:    · You passed out (lost consciousness).    Call your doctor now or seek immediate medical care if:    · You have a fever.     · You have abnormal bleeding.     · You think you have an infection.     · You have new or worse pain.     · You have new symptoms, such as a cough, belly pain, vomiting, diarrhea, or a rash. Watch closely for changes in your health, and be sure to contact your doctor if:    · You are much more tired than usual.     · You have swollen glands in your armpits, groin, or neck.     · You do not get better as expected. Where can you learn more? Go to http://www.Doppelganger.com/  Enter V321 in the search box to learn more about \"Breast Cancer: Care Instructions. \"  Current as of: December 17, 2020               Content Version: 12.8  © 7887-4085 [employer]. Care instructions adapted under license by [employer] (which disclaims liability or warranty for this information). If you have questions about a medical condition or this instruction, always ask your healthcare professional. [address] any warranty or liability for your use of this information.

## 2021-09-13 NOTE — PROGRESS NOTES
HISTORY OF PRESENT ILLNESS  June Madrid is a 76 y.o. female. HPI  ESTABLISHED patient here for breast talk for newly diagnosed RIGHT breast cancer. She is here alone but would like to have her daughter on speaker phone.     08/23/21: RIGHT breast bx, LOQ. PATH: ILC, 9mm, overall grade 2, ER+(95%)/VA-/HER2-, Ki-67 15%. Clinical stage 2.  - MammaPrint: High risk, luminal type B, -0.101.    2012: LEFT breast cancer, treated with lumpectomy, radiation, and Tamoxifen x5 years.     Breast imaging-  MRI Results (most recent):  Results from Hospital Encounter encounter on 08/31/21     MRI BREAST BI W WO CONT     Narrative  EXAM:  MRI BREAST BI W WO CONT     INDICATION: New diagnosis of right breast invasive lobular carcinoma. Evaluate  extent of disease. Previous left lumpectomy for carcinoma in 2012.     COMPARISON: Mammography dating back to 8/8/2016. No comparison 1/19/2012. on MRI.     EXAM: MRI of right and left breast without and with IV contrast Gadavist .     TECHNIQUE: MRI breast without and with IV contrast. Bilateral breast MRI was  performed using a dedicated breast coil without compression with the patient in  the prone position. Precontrast T1-weighted images with fat suppression were  obtained followed by bolus injection of 7 mL of Gadavist . Postcontrast dynamic  and high-resolution images were acquired. Axial T2 fat-saturated imaging was  also performed. The images were analyzed using CAD analysis, enhancement curves,  digital subtraction, and 2 and 3 dimensional reconstructions.     FINDINGS:     Background parenchymal enhancement: Moderate.     Lymph nodes: No lymphadenopathy.     Right breast: Irregular enhancing mass with left toe and washout kinetics in the  middle third of the right breast in the 7:00 position contains a biopsy clip and  measures 3.4 x 1.1 x 1.9 cm. No extension to skin. No other suspicious  morphology or enhancement in the right breast. Scattered background foci are  increased. Skin and nipple are within normal limits.     Left breast: There is no suspicious focus of morphology or temporal enhancement. Lumpectomy scar is posterior in the upper outer quadrant. Nipple angulation is  expected postsurgery. No unexpected skin thickening.     Miscellaneous: Hepatic cysts are partially imaged and grossly unchanged. This  does not represent a full evaluation of the liver.     Impression  1. 3.4 cm biopsy-proven carcinoma in the right breast. No evidence of  multicentric disease. 2. No lymphadenopathy. 3. No MRI evidence of malignancy in the posttreatment left breast.     Assessment: ACR BI-RADS category  Right breast: BI-RADS Assessment Category 6: Known biopsy proven malignancy-  Appropriate action should be taken. Left breast: BI-RADS Assessment Category 2: Benign finding.     Recommendation: Surgical and oncologic management. Based on history and imaging  findings, patient is a candidate for breast conservation surgery of the right  breast.     A negative breast MRI examination speaks strongly against invasive cancer down  to a detection threshold of 3 to 5 mm but may not detect some lower grade or in  situ carcinomas. Therefore, routine clinical and mammographic followup are  recommended. A summary portfolio has been created in PACS.       Past Medical History:   Diagnosis Date    ADD (attention deficit disorder) 8/17/2009    Breast cancer ([facility] 2012    Left Breast    Depressive disorder, not elsewhere classified 8/17/2009    Essential hypertension, benign 8/17/2009    History of mammogram 02/17/2020    BI-RADS Category 3    Hypertension     Pap smear for cervical cancer screening 07/28/2016; 01/09/2020    negative, HPV negative; negative, HPV negative    Psychiatric disorder 2007    panic attacks/PTSD    PTSD (post-traumatic stress disorder)     Thyroid disease     Type 2 diabetes mellitus without complication ([facility] [address] 2/07/7766    Unspecified hypothyroidism 8/17/2009       Past Surgical History:   Procedure Laterality Date    HX BREAST LUMPECTOMY Left 2012    HX GYN      c section    HX HEENT  1992    partial thyroidectomy    HX TONSILLECTOMY      HX UROLOGICAL  1992    Bladder suspension    TX BREAST SURGERY PROCEDURE UNLISTED  January 2012    left breast biopsy       Social History     Socioeconomic History    Marital status:      Spouse name: Not on file    Number of children: Not on file    Years of education: Not on file    Highest education level: Not on file   Occupational History    Not on file   Tobacco Use    Smoking status: Never Smoker    Smokeless tobacco: Never Used   Substance and Sexual Activity    Alcohol use: No     Alcohol/week: 0.0 standard drinks    Drug use: No    Sexual activity: Never     Partners: Male   Other Topics Concern    Not on file   Social History Narrative    Not on file     Social Determinants of Health     Financial Resource Strain:     Difficulty of Paying Living Expenses:    Food Insecurity:     Worried About Running Out of Food in the Last Year:     Ran Out of Food in the Last Year:    Transportation Needs:     Lack of Transportation (Medical):      Lack of Transportation (Non-Medical):    Physical Activity:     Days of Exercise per Week:     Minutes of Exercise per Session:    Stress:     Feeling of Stress :    Social Connections:     Frequency of Communication with Friends and Family:     Frequency of Social Gatherings with Friends and Family:     Attends [employer] Services:     Active Member of Clubs or Organizations:     Attends Club or Organization Meetings:     Marital Status:    Intimate Partner Violence:     Fear of Current or Ex-Partner:     Emotionally Abused:     Physically Abused:     Sexually Abused:        Current Outpatient Medications on File Prior to Visit   Medication Sig Dispense Refill    rOPINIRole (REQUIP) 4 mg tab TAB TAKE 1 TABLET BY MOUTH EVERY DAY 90 Tablet 0    ALPRAZolam [facility]) 0.5 mg tablet Take 1 Tablet by mouth three (3) times daily as needed for Anxiety (take as directed). Max Daily Amount: 1.5 mg. Indications: anxious 12 Tablet 0    sertraline (ZOLOFT) 50 mg tablet Take 50 mg by mouth daily.  pregabalin (LYRICA) 200 mg capsule TAKE 1 CAPSULE BY MOUTH THREE (3) TIMES DAILY. MAX DAILY AMOUNT: 600 MG. 90 Capsule 5    losartan (COZAAR) 100 mg tablet TAKE 1 TABLET BY MOUTH EVERY DAY 90 Tablet 1    metFORMIN ER (GLUCOPHAGE XR) 500 mg tablet TAKE TWO TABLETS BY MOUTH EVERY MORNING AND 1 TABLET AT NIGHT 270 Tablet 1    lidocaine-prilocaine (EMLA) topical cream APPLY TO AFFECTED AREA(S) AS NEEDED FOR PAIN 30 g 2    levothyroxine (SYNTHROID) 137 mcg tablet TAKE 1 TABLET BY MOUTH EVERY DAY 90 Tablet 2    guanFACINE ER (INTUNIV) 4 mg Tb24 ER tablet Take 4 mg by mouth daily.  methocarbamoL (Robaxin) 500 mg tablet Take 1 Tab by mouth three (3) times daily as needed for Muscle Spasm(s). 10 Tab 0    diclofenac (VOLTAREN) 1 % gel Apply  to affected area four (4) times daily. 50 g 0    meloxicam (MOBIC) 15 mg tablet TAKE 1 TABLET BY MOUTH EVERY DAY 90 Tab 1    pantoprazole (PROTONIX) 40 mg tablet TAKE 1 TABLET BY MOUTH EVERY DAY 90 Tab 1    clonazePAM (KlonoPIN) 1 mg tablet TAKE 1 TAB BY MOUTH THREE (3) TIMES DAILY. MAX DAILY AMOUNT: 3 MG. 90 Tab 0    FreeStyle Yaquelin 14 Day Sensor kit APPLY 1 SENSOR EVERY 14 DAYS TO CHECK BLOOD SUGARS DX:E11.9 2 Kit 3    lamoTRIgine (LaMICtal) 200 mg tablet Take 200 mg by mouth daily.  albuterol (PROVENTIL HFA, VENTOLIN HFA, PROAIR HFA) 90 mcg/actuation inhaler Take 2 Puffs by inhalation every four (4) hours as needed for Wheezing. 1 Inhaler 1    simvastatin (ZOCOR) 10 mg tablet TAKE 1 TABLET BY MOUTH EVERY DAY AT NIGHT 90 Tab 0    lancets misc Use to test blood sugars once daily.  Dx:E11.9 100 Each 3    flash glucose scanning reader (FREESTYLE YAQUELIN 14 DAY READER) misc Use to check blood sugars throughout the day 1 Each 0    ONETOUCH ULTRA BLUE TEST STRIP strip TEST SUGARS ONCE DAILY AS DIRECTED 50 Strip 0    traZODone (DESYREL) 100 mg tablet TAKE 3 TABLETS BY MOUTH AT BEDTIME (Patient taking differently: Take 200 mg by mouth nightly.) 270 Tab 1    DAILY MULTI-VITAMIN PO Take 1 Tab by mouth daily. No current facility-administered medications on file prior to visit. No Known Allergies    OB History    No obstetric history on file. Obstetric Comments   Menarche: 5   LMP:age 53. # of Children:  3 Age at Delivery of First Child: 24.   Hysterectomy/oophorectomy: no/no. Breast Bx: left 2012. Hx of Breast Feeding: no  BCP: yes [id number]. Hormone therapy: yes                ROS    Physical Exam       ASSESSMENT and PLAN    ICD-10-CM ICD-9-CM    1. Invasive lobular carcinoma of right breast in female [facility])  C50.911 174.9    2. Breast mass, right  N63.10 611.72    3. History of left breast cancer  Z85.3 V10.3       Pt presents for consultation for treatment of RIGHT breast ILC, ER+/[place]-/HER2-, Ki-67 15%, clinical stage 2. We had a long discussion of options for treatment. The patient was accompanied by her daughter on speakerphone during this encounter, and over half the time was spent on counseling and coordination of care. We discussed in depth the pathology and MRI results, and the need for treatment. The goals of treatment are to treat the breast, and to reduce risk of local or distant recurrence. Discussed treatment options with risks, complications, benefits, and limitations, including lumpectomy with XRT, and mastectomy with optional reconstruction, both having the same cure rate. Pt notes preference for lumpectomy, and she is a good candidate for this option. Explained the importance of negative margins, and the need for re-excision in the case of positive margins. Will plan to mobilize breast tissue during lumpectomy to minimize cosmetic defect. Also discussed benefit and technique of SLNBx of 3-4 LNs.     We also covered risk reduction strategies including XRT, chemotherapy, and hormonal therapy with estrogen blocker. Discussed XRT, which will likely be 5 days a week for 4 weeks. Discussed MammaPrint results to help determine whether pt will benefit from chemotherapy. With high risk MammaPrint, will further evaluate need for chemo based on final tumor size and LN involvement. Discussed estrogen blocker for further risk reduction for ER+ disease. We also discussed the pts questions and concerns. Pt is concerned about staying at home following surgery, so will consider keeping pt in hospital overnight for observation following surgery. Pt should feel free to call Melida Encarnacion, nurse navigator, with any further questions. Pt has elected to proceed with lumpectomy. She understands and consents to surgery. Will schedule for the [facility] future at [facility], and scheduling will f/u with the date. This plan was reviewed with the patient and patient agrees. All questions were answered. Total time spent was 40 minutes.     Written by Bailey Mcmanus, as dictated by Dr. Patria Montoya MD.

## 2021-09-13 NOTE — PROGRESS NOTES
HISTORY OF PRESENT ILLNESS  Meryle Canterbury is a 76 y.o. female. HPI ESTABLISHED patient here for breast talk for newly diagnosed RIGHT breast cancer. She is here alone but would like to have her daughter on speaker phone. 08/23/21: RIGHT breast bx, LOQ. PATH: ILC, 9mm, overall grade 2. ER/NC/HER2/Ki-67 pending.  - MammaPrint: High risk, luminal type B, -0.101. Breast imaging-  MRI Results (most recent):  Results from Hospital Encounter encounter on 08/31/21    MRI BREAST BI W WO CONT    Narrative  EXAM:  MRI BREAST BI W WO CONT    INDICATION: New diagnosis of right breast invasive lobular carcinoma. Evaluate  extent of disease. Previous left lumpectomy for carcinoma in 2012. COMPARISON: Mammography dating back to 8/8/2016. No comparison 1/19/2012. on MRI. EXAM: MRI of right and left breast without and with IV contrast Gadavist .    TECHNIQUE: MRI breast without and with IV contrast. Bilateral breast MRI was  performed using a dedicated breast coil without compression with the patient in  the prone position. Precontrast T1-weighted images with fat suppression were  obtained followed by bolus injection of 7 mL of Gadavist . Postcontrast dynamic  and high-resolution images were acquired. Axial T2 fat-saturated imaging was  also performed. The images were analyzed using CAD analysis, enhancement curves,  digital subtraction, and 2 and 3 dimensional reconstructions. FINDINGS:    Background parenchymal enhancement: Moderate. Lymph nodes: No lymphadenopathy. Right breast: Irregular enhancing mass with left toe and washout kinetics in the  middle third of the right breast in the 7:00 position contains a biopsy clip and  measures 3.4 x 1.1 x 1.9 cm. No extension to skin. No other suspicious  morphology or enhancement in the right breast. Scattered background foci are  increased. Skin and nipple are within normal limits.     Left breast: There is no suspicious focus of morphology or temporal enhancement. Lumpectomy scar is posterior in the upper outer quadrant. Nipple angulation is  expected postsurgery. No unexpected skin thickening. Miscellaneous: Hepatic cysts are partially imaged and grossly unchanged. This  does not represent a full evaluation of the liver. Impression  1. 3.4 cm biopsy-proven carcinoma in the right breast. No evidence of  multicentric disease. 2. No lymphadenopathy. 3. No MRI evidence of malignancy in the posttreatment left breast.    Assessment: ACR BI-RADS category  Right breast: BI-RADS Assessment Category 6: Known biopsy proven malignancy-  Appropriate action should be taken. Left breast: BI-RADS Assessment Category 2: Benign finding. Recommendation: Surgical and oncologic management. Based on history and imaging  findings, patient is a candidate for breast conservation surgery of the right  breast.    A negative breast MRI examination speaks strongly against invasive cancer down  to a detection threshold of 3 to 5 mm but may not detect some lower grade or in  situ carcinomas. Therefore, routine clinical and mammographic followup are  recommended. A summary portfolio has been created in PACS.       ROS    Physical Exam    ASSESSMENT and PLAN  {ASSESSMENT/PLAN:98595}

## 2021-09-22 DIAGNOSIS — K21.00 GERD WITH ESOPHAGITIS: ICD-10-CM

## 2021-09-22 RX ORDER — PANTOPRAZOLE SODIUM 40 MG/1
TABLET, DELAYED RELEASE ORAL
Qty: 90 TABLET | Refills: 1 | Status: SHIPPED | OUTPATIENT
Start: 2021-09-22 | End: 2022-03-24

## 2021-09-27 ENCOUNTER — NURSE NAVIGATOR (OUTPATIENT)
Dept: CASE MANAGEMENT | Age: 66
End: 2021-09-27

## 2021-09-27 ENCOUNTER — TRANSCRIBE ORDER (OUTPATIENT)
Dept: SCHEDULING | Age: 66
End: 2021-09-27

## 2021-09-27 DIAGNOSIS — C50.911 MALIGNANT NEOPLASM OF RIGHT FEMALE BREAST, UNSPECIFIED ESTROGEN RECEPTOR STATUS, UNSPECIFIED SITE OF BREAST (HCC): Primary | ICD-10-CM

## 2021-09-27 DIAGNOSIS — C50.911 BREAST CANCER, RIGHT (HCC): Primary | ICD-10-CM

## 2021-09-27 NOTE — NURSE NAVIGATOR
[facility]  Breast Navigator Encounter    Name: Bishop Ahn  Age: 76 y.o.  : 1955  Diagnosis: Right breast ILC; ER+/[place]-/HER2-    Encounter type:  []Patient Initiated  [x]Navigator Follow-up []Pre-op  []Post-op  []Check-in Prior to First Treatment []Treatment Modality Change  []Survivorship Transition []Other:     Narrative:   Called pt to follow-up with status of surgical scheduling. She states she received a call this morning and surgery is all set for 10/12.          GRETCHEN ReneN, RN, [facility]  Oncology Breast Navigator    [address]  W: [phone]  F: [phone]  [email or web address]   Good Help to Those in [facility]

## 2021-10-04 ENCOUNTER — HOSPITAL ENCOUNTER (OUTPATIENT)
Dept: PREADMISSION TESTING | Age: 66
Discharge: HOME | End: 2021-10-04
Payer: MEDICARE

## 2021-10-04 VITALS
TEMPERATURE: 96.9 F | BODY MASS INDEX: 27.08 KG/M2 | WEIGHT: 158.6 LBS | SYSTOLIC BLOOD PRESSURE: 131 MMHG | HEART RATE: 75 BPM | OXYGEN SATURATION: 95 % | HEIGHT: 64 IN | DIASTOLIC BLOOD PRESSURE: 76 MMHG

## 2021-10-04 DIAGNOSIS — C50.911 MALIGNANT NEOPLASM OF RIGHT FEMALE BREAST, UNSPECIFIED ESTROGEN RECEPTOR STATUS, UNSPECIFIED SITE OF BREAST (HCC): ICD-10-CM

## 2021-10-04 LAB
ANION GAP SERPL CALC-SCNC: 8 MMOL/L (ref 5–15)
BUN SERPL-MCNC: 26 MG/DL (ref 6–20)
BUN/CREAT SERPL: 34 (ref 12–20)
CALCIUM SERPL-MCNC: 9.4 MG/DL (ref 8.5–10.1)
CHLORIDE SERPL-SCNC: 105 MMOL/L (ref 97–108)
CO2 SERPL-SCNC: 25 MMOL/L (ref 21–32)
CREAT SERPL-MCNC: 0.76 MG/DL (ref 0.55–1.02)
GLUCOSE SERPL-MCNC: 124 MG/DL (ref 65–100)
POTASSIUM SERPL-SCNC: 4.7 MMOL/L (ref 3.5–5.1)
SODIUM SERPL-SCNC: 138 MMOL/L (ref 136–145)

## 2021-10-04 PROCEDURE — 80048 BASIC METABOLIC PNL TOTAL CA: CPT

## 2021-10-04 PROCEDURE — 36415 COLL VENOUS BLD VENIPUNCTURE: CPT

## 2021-10-04 PROCEDURE — 93005 ELECTROCARDIOGRAM TRACING: CPT

## 2021-10-04 RX ORDER — HYDROXYZINE 50 MG/1
50 TABLET, FILM COATED ORAL
COMMUNITY
End: 2021-10-07 | Stop reason: SDUPTHER

## 2021-10-04 RX ORDER — TRAZODONE HYDROCHLORIDE 100 MG/1
200 TABLET ORAL
COMMUNITY

## 2021-10-04 RX ORDER — NAPROXEN SODIUM 220 MG
220 TABLET ORAL
COMMUNITY
End: 2022-08-22

## 2021-10-04 NOTE — PERIOP NOTES
[address] [facility] [address], [address] [facility]   MAIN OR                                  [phone]   MAIN PRE OP                          [phone]                                                                                AMBULATORY PRE OP          [phone]  PRE-ADMISSION TESTING    [phone]   Surgery Date:    Tuesday 10/12/21        Is surgery arrival time given by surgeon? NO  If NO, [facility] staff will call you between 3 and 7pm the day before your surgery with your arrival time. (If your surgery is on a Monday, we will call you the Friday before.)    Call [phone] after 7pm Monday-Friday if you did not receive this call. INSTRUCTIONS BEFORE YOUR SURGERY   When You  Arrive Arrive at the [address] [facility] on the day of your surgery  Have your insurance card, photo ID, and any copayment (if needed)   Food   and   Drink NO food or drink after midnight the night before surgery    This means NO water, gum, mints, coffee, juice, etc.  No alcohol (beer, wine, liquor) 24 hours before and after surgery   Medications to   TAKE   Morning of Surgery MEDICATIONS TO TAKE THE MORNING OF SURGERY WITH A SIP OF WATER:    Hydroxzine   Protonix   Zoloft   Synthroid   Medications  To  STOP      7 days before surgery  Non-Steroidal anti-inflammatory Drugs (NSAID's): for example, Ibuprofen (Advil, Motrin), Naproxen (Aleve)   Aspirin, if taking for pain    Herbal supplements, vitamins, and fish oil   Other:  (Pain medications not listed above, including Tylenol may be taken)   Blood  Thinners     Bathing Clothing  Jewelry  Valuables      If you shower the morning of surgery, please do not apply anything to your skin (lotions, powders, deodorant, or makeup, especially mascara)   Follow Chlorhexidine [employer] body wash instructions provided to you during PAT appointment. Begin 3 days prior to surgery.    Do not shave or trim anywhere 24 hours before surgery   Wear your hair loose or down; no pony-tails, buns, or metal hair clips   Wear loose, comfortable, clean clothes   Wear glasses instead of contacts  [address], valuables, and jewelry, including body piercings, at home   If you were given an [employer], bring it on day of surgery. Going Home - or Spending the Night  SAME-DAY SURGERY: You must have a responsible adult drive you home and stay with you 24 hours after surgery   ADMITS: If your doctor is keeping you in the hospital after surgery, leave personal belongings/luggage in your car until you have a hospital room number. Hospital discharge time is 12 noon  Drivers must be here before 12 noon unless you are told differently   Special Instructions It is now mandated that all surgical patients be tested for COVID-19 prior to surgery. Testing has to be exactly 4 days prior to surgery. Your COVID test date is Friday 10/8/21 between 8:00 am and 11:00 am.       COVID testing will be performed curbside at the [facility] [address]. There will be signs leading you to the testing site. You will need to bring a photo ID with you to be swabbed. Patients are advised to self-quarantine at home after testing and prior to your surgery date. You will be notified if your results are positive.     What to watch for:   Coronavirus (COVID-19) affects different people in different ways   It also appears with a wide range of symptoms from mild to severe   Signs usually appear 2-14 days after exposure     If you develop any of the following, notify your doctor immediately:  o Fever  o Chills, with or without a shiver  o Muscle pain  o Headache  o Sore throat  o Dry cough  o New loss of taste or smell  o Tiredness      If you develop any of the following, call 911:  o Shortness of breath  o Difficulty breathing  o Chest pain  o New confusion  o Blueness of fingers and/or lips       Follow all instructions so your surgery wont be cancelled. Please, be on time. If a situation occurs and you are delayed the day of surgery, call  [phone]. If your physical condition changes (like a fever, cold, flu, etc.) call your surgeon. Home medication(s) reviewed and verified via    LIST   VERBAL   during PAT appointment. The patient was contacted by     IN-PERSON    The patient verbalizes understanding of all instructions and      DOES NOT   need reinforcement.

## 2021-10-06 LAB
ATRIAL RATE: 90 BPM
CALCULATED P AXIS, ECG09: 0 DEGREES
CALCULATED R AXIS, ECG10: -21 DEGREES
CALCULATED T AXIS, ECG11: 9 DEGREES
DIAGNOSIS, 93000: NORMAL
P-R INTERVAL, ECG05: 188 MS
Q-T INTERVAL, ECG07: 364 MS
QRS DURATION, ECG06: 76 MS
QTC CALCULATION (BEZET), ECG08: 445 MS
VENTRICULAR RATE, ECG03: 90 BPM

## 2021-10-07 ENCOUNTER — PATIENT MESSAGE (OUTPATIENT)
Dept: NEUROLOGY | Age: 66
End: 2021-10-07

## 2021-10-07 RX ORDER — HYDROXYZINE 50 MG/1
50 TABLET, FILM COATED ORAL
Qty: 120 TABLET | Refills: 0 | Status: SHIPPED | OUTPATIENT
Start: 2021-10-07 | End: 2021-10-27

## 2021-10-07 RX ORDER — LAMOTRIGINE 200 MG/1
200 TABLET ORAL DAILY
Qty: 30 TABLET | Refills: 0 | Status: SHIPPED | OUTPATIENT
Start: 2021-10-07

## 2021-10-08 ENCOUNTER — HOSPITAL ENCOUNTER (OUTPATIENT)
Dept: PREADMISSION TESTING | Age: 66
Discharge: HOME | End: 2021-10-08
Payer: MEDICARE

## 2021-10-08 ENCOUNTER — TELEPHONE (OUTPATIENT)
Dept: INTERNAL MEDICINE CLINIC | Age: 66
End: 2021-10-08

## 2021-10-08 PROCEDURE — U0005 INFEC AGEN DETEC AMPLI PROBE: HCPCS

## 2021-10-11 ENCOUNTER — ANESTHESIA EVENT (OUTPATIENT)
Dept: SURGERY | Age: 66
End: 2021-10-11
Payer: MEDICARE

## 2021-10-11 LAB
SARS-COV-2, XPLCVT: NOT DETECTED
SOURCE, COVRS: NORMAL

## 2021-10-12 ENCOUNTER — APPOINTMENT (OUTPATIENT)
Dept: NUCLEAR MEDICINE | Age: 66
End: 2021-10-12
Attending: SURGERY
Payer: MEDICARE

## 2021-10-12 ENCOUNTER — HOSPITAL ENCOUNTER (OUTPATIENT)
Age: 66
Setting detail: OBSERVATION
Discharge: HOME | End: 2021-10-13
Attending: SURGERY | Admitting: SURGERY
Payer: MEDICARE

## 2021-10-12 ENCOUNTER — ANESTHESIA (OUTPATIENT)
Dept: SURGERY | Age: 66
End: 2021-10-12
Payer: MEDICARE

## 2021-10-12 DIAGNOSIS — C50.911 MALIGNANT NEOPLASM OF RIGHT FEMALE BREAST, UNSPECIFIED ESTROGEN RECEPTOR STATUS, UNSPECIFIED SITE OF BREAST (HCC): ICD-10-CM

## 2021-10-12 PROBLEM — C50.919 BREAST CANCER (HCC): Status: ACTIVE | Noted: 2021-10-12

## 2021-10-12 LAB
GLUCOSE BLD STRIP.AUTO-MCNC: 152 MG/DL (ref 65–117)
GLUCOSE BLD STRIP.AUTO-MCNC: 163 MG/DL (ref 65–117)
GLUCOSE BLD STRIP.AUTO-MCNC: 277 MG/DL (ref 65–117)
SERVICE CMNT-IMP: ABNORMAL

## 2021-10-12 PROCEDURE — 74011000250 HC RX REV CODE- 250: Performed by: SURGERY

## 2021-10-12 PROCEDURE — 74011250636 HC RX REV CODE- 250/636: Performed by: ANESTHESIOLOGY

## 2021-10-12 PROCEDURE — A9520 TC99 TILMANOCEPT DIAG 0.5MCI: HCPCS

## 2021-10-12 PROCEDURE — 76998 US GUIDE INTRAOP: CPT | Performed by: SURGERY

## 2021-10-12 PROCEDURE — 76210000038 HC AMBSU PH I REC 2.5 TO 3 HR: Performed by: SURGERY

## 2021-10-12 PROCEDURE — 77030034626 HC LIGASURE SM JAW SEAL OPN SURG COVD -E: Performed by: SURGERY

## 2021-10-12 PROCEDURE — 77030040361 HC SLV COMPR DVT MDII -B

## 2021-10-12 PROCEDURE — 74011250636 HC RX REV CODE- 250/636: Performed by: SURGERY

## 2021-10-12 PROCEDURE — 76060000062 HC AMB SURG ANES 1 TO 1.5 HR: Performed by: SURGERY

## 2021-10-12 PROCEDURE — 82962 GLUCOSE BLOOD TEST: CPT

## 2021-10-12 PROCEDURE — 74011250636 HC RX REV CODE- 250/636: Performed by: NURSE ANESTHETIST, CERTIFIED REGISTERED

## 2021-10-12 PROCEDURE — 76030000001 HC AMB SURG OR TIME 1 TO 1.5: Performed by: SURGERY

## 2021-10-12 PROCEDURE — 38525 BIOPSY/REMOVAL LYMPH NODES: CPT | Performed by: SURGERY

## 2021-10-12 PROCEDURE — 2709999900 HC NON-CHARGEABLE SUPPLY: Performed by: SURGERY

## 2021-10-12 PROCEDURE — 77030002996 HC SUT SLK J&J -A: Performed by: SURGERY

## 2021-10-12 PROCEDURE — 88307 TISSUE EXAM BY PATHOLOGIST: CPT

## 2021-10-12 PROCEDURE — 99218 HC RM OBSERVATION: CPT

## 2021-10-12 PROCEDURE — 14301 TIS TRNFR ANY 30.1-60 SQ CM: CPT | Performed by: SURGERY

## 2021-10-12 PROCEDURE — 77030011267 HC ELECTRD BLD COVD -A: Performed by: SURGERY

## 2021-10-12 PROCEDURE — 74011250637 HC RX REV CODE- 250/637: Performed by: SURGERY

## 2021-10-12 PROCEDURE — 19301 PARTIAL MASTECTOMY: CPT | Performed by: SURGERY

## 2021-10-12 PROCEDURE — 77030041680 HC PNCL ELECSURG SMK EVAC CNMD -B: Performed by: SURGERY

## 2021-10-12 PROCEDURE — 74011000250 HC RX REV CODE- 250: Performed by: NURSE ANESTHETIST, CERTIFIED REGISTERED

## 2021-10-12 PROCEDURE — 77030020143 HC AIRWY LARYN INTUB CGAS -A: Performed by: ANESTHESIOLOGY

## 2021-10-12 RX ORDER — LIDOCAINE HYDROCHLORIDE AND EPINEPHRINE 10; 10 MG/ML; UG/ML
30 INJECTION, SOLUTION INFILTRATION; PERINEURAL ONCE
Status: COMPLETED | OUTPATIENT
Start: 2021-10-12 | End: 2021-10-12

## 2021-10-12 RX ORDER — DEXTROSE, SODIUM CHLORIDE, AND POTASSIUM CHLORIDE 5; .45; .15 G/100ML; G/100ML; G/100ML
50 INJECTION INTRAVENOUS CONTINUOUS
Status: DISCONTINUED | OUTPATIENT
Start: 2021-10-12 | End: 2021-10-13 | Stop reason: HOSPADM

## 2021-10-12 RX ORDER — ALPRAZOLAM 0.5 MG/1
0.5 TABLET ORAL
COMMUNITY
End: 2021-10-27

## 2021-10-12 RX ORDER — HYDROCODONE BITARTRATE AND ACETAMINOPHEN 5; 325 MG/1; MG/1
1 TABLET ORAL
Qty: 20 TABLET | Refills: 0 | Status: SHIPPED | OUTPATIENT
Start: 2021-10-12 | End: 2021-10-19

## 2021-10-12 RX ORDER — SODIUM CHLORIDE 0.9 % (FLUSH) 0.9 %
5-40 SYRINGE (ML) INJECTION EVERY 8 HOURS
Status: DISCONTINUED | OUTPATIENT
Start: 2021-10-12 | End: 2021-10-13 | Stop reason: HOSPADM

## 2021-10-12 RX ORDER — SODIUM CHLORIDE, SODIUM LACTATE, POTASSIUM CHLORIDE, CALCIUM CHLORIDE 600; 310; 30; 20 MG/100ML; MG/100ML; MG/100ML; MG/100ML
125 INJECTION, SOLUTION INTRAVENOUS CONTINUOUS
Status: DISCONTINUED | OUTPATIENT
Start: 2021-10-12 | End: 2021-10-12

## 2021-10-12 RX ORDER — DIPHENHYDRAMINE HYDROCHLORIDE 50 MG/ML
12.5 INJECTION, SOLUTION INTRAMUSCULAR; INTRAVENOUS AS NEEDED
Status: DISCONTINUED | OUTPATIENT
Start: 2021-10-12 | End: 2021-10-12 | Stop reason: HOSPADM

## 2021-10-12 RX ORDER — LIDOCAINE HYDROCHLORIDE 10 MG/ML
0.1 INJECTION, SOLUTION EPIDURAL; INFILTRATION; INTRACAUDAL; PERINEURAL AS NEEDED
Status: DISCONTINUED | OUTPATIENT
Start: 2021-10-12 | End: 2021-10-12 | Stop reason: HOSPADM

## 2021-10-12 RX ORDER — PROPOFOL 10 MG/ML
INJECTION, EMULSION INTRAVENOUS AS NEEDED
Status: DISCONTINUED | OUTPATIENT
Start: 2021-10-12 | End: 2021-10-12 | Stop reason: HOSPADM

## 2021-10-12 RX ORDER — SODIUM CHLORIDE 0.9 % (FLUSH) 0.9 %
5-40 SYRINGE (ML) INJECTION EVERY 8 HOURS
Status: DISCONTINUED | OUTPATIENT
Start: 2021-10-12 | End: 2021-10-12 | Stop reason: HOSPADM

## 2021-10-12 RX ORDER — SODIUM CHLORIDE, SODIUM LACTATE, POTASSIUM CHLORIDE, CALCIUM CHLORIDE 600; 310; 30; 20 MG/100ML; MG/100ML; MG/100ML; MG/100ML
125 INJECTION, SOLUTION INTRAVENOUS CONTINUOUS
Status: DISCONTINUED | OUTPATIENT
Start: 2021-10-12 | End: 2021-10-12 | Stop reason: HOSPADM

## 2021-10-12 RX ORDER — HYDROCODONE BITARTRATE AND ACETAMINOPHEN 10; 325 MG/1; MG/1
1 TABLET ORAL
Status: DISCONTINUED | OUTPATIENT
Start: 2021-10-12 | End: 2021-10-13 | Stop reason: HOSPADM

## 2021-10-12 RX ORDER — SODIUM CHLORIDE 0.9 % (FLUSH) 0.9 %
5-40 SYRINGE (ML) INJECTION AS NEEDED
Status: DISCONTINUED | OUTPATIENT
Start: 2021-10-12 | End: 2021-10-12 | Stop reason: HOSPADM

## 2021-10-12 RX ORDER — LAMOTRIGINE 100 MG/1
200 TABLET ORAL DAILY
Status: DISCONTINUED | OUTPATIENT
Start: 2021-10-13 | End: 2021-10-13 | Stop reason: HOSPADM

## 2021-10-12 RX ORDER — ACETAMINOPHEN 325 MG/1
650 TABLET ORAL
Status: DISCONTINUED | OUTPATIENT
Start: 2021-10-12 | End: 2021-10-13 | Stop reason: HOSPADM

## 2021-10-12 RX ORDER — METFORMIN HYDROCHLORIDE 500 MG/1
500 TABLET, EXTENDED RELEASE ORAL 2 TIMES DAILY
Status: DISCONTINUED | OUTPATIENT
Start: 2021-10-12 | End: 2021-10-13 | Stop reason: HOSPADM

## 2021-10-12 RX ORDER — BUPIVACAINE HYDROCHLORIDE AND EPINEPHRINE 5; 5 MG/ML; UG/ML
30 INJECTION, SOLUTION EPIDURAL; INTRACAUDAL; PERINEURAL ONCE
Status: COMPLETED | OUTPATIENT
Start: 2021-10-12 | End: 2021-10-12

## 2021-10-12 RX ORDER — LOSARTAN POTASSIUM 50 MG/1
100 TABLET ORAL DAILY
Status: DISCONTINUED | OUTPATIENT
Start: 2021-10-13 | End: 2021-10-13 | Stop reason: HOSPADM

## 2021-10-12 RX ORDER — DIPHENHYDRAMINE HYDROCHLORIDE 50 MG/ML
12.5 INJECTION, SOLUTION INTRAMUSCULAR; INTRAVENOUS
Status: DISCONTINUED | OUTPATIENT
Start: 2021-10-12 | End: 2021-10-13 | Stop reason: HOSPADM

## 2021-10-12 RX ORDER — ONDANSETRON 2 MG/ML
4 INJECTION INTRAMUSCULAR; INTRAVENOUS AS NEEDED
Status: DISCONTINUED | OUTPATIENT
Start: 2021-10-12 | End: 2021-10-12 | Stop reason: HOSPADM

## 2021-10-12 RX ORDER — HYDROCODONE BITARTRATE AND ACETAMINOPHEN 5; 325 MG/1; MG/1
1 TABLET ORAL
Status: DISCONTINUED | OUTPATIENT
Start: 2021-10-12 | End: 2021-10-13 | Stop reason: HOSPADM

## 2021-10-12 RX ORDER — TRAZODONE HYDROCHLORIDE 100 MG/1
200 TABLET ORAL
Status: DISCONTINUED | OUTPATIENT
Start: 2021-10-12 | End: 2021-10-13 | Stop reason: HOSPADM

## 2021-10-12 RX ORDER — ALPRAZOLAM 0.5 MG/1
0.5 TABLET ORAL
Status: DISCONTINUED | OUTPATIENT
Start: 2021-10-12 | End: 2021-10-13 | Stop reason: HOSPADM

## 2021-10-12 RX ORDER — NALOXONE HYDROCHLORIDE 0.4 MG/ML
0.04 INJECTION, SOLUTION INTRAMUSCULAR; INTRAVENOUS; SUBCUTANEOUS
Status: DISCONTINUED | OUTPATIENT
Start: 2021-10-12 | End: 2021-10-12 | Stop reason: HOSPADM

## 2021-10-12 RX ORDER — HYDROXYZINE 25 MG/1
50 TABLET, FILM COATED ORAL
Status: DISCONTINUED | OUTPATIENT
Start: 2021-10-12 | End: 2021-10-13 | Stop reason: HOSPADM

## 2021-10-12 RX ORDER — ONDANSETRON 2 MG/ML
INJECTION INTRAMUSCULAR; INTRAVENOUS AS NEEDED
Status: DISCONTINUED | OUTPATIENT
Start: 2021-10-12 | End: 2021-10-12 | Stop reason: HOSPADM

## 2021-10-12 RX ORDER — SERTRALINE HYDROCHLORIDE 50 MG/1
50 TABLET, FILM COATED ORAL DAILY
Status: DISCONTINUED | OUTPATIENT
Start: 2021-10-13 | End: 2021-10-13 | Stop reason: HOSPADM

## 2021-10-12 RX ORDER — MIDAZOLAM HYDROCHLORIDE 1 MG/ML
INJECTION, SOLUTION INTRAMUSCULAR; INTRAVENOUS AS NEEDED
Status: DISCONTINUED | OUTPATIENT
Start: 2021-10-12 | End: 2021-10-12 | Stop reason: HOSPADM

## 2021-10-12 RX ORDER — FENTANYL CITRATE 50 UG/ML
INJECTION, SOLUTION INTRAMUSCULAR; INTRAVENOUS AS NEEDED
Status: DISCONTINUED | OUTPATIENT
Start: 2021-10-12 | End: 2021-10-12 | Stop reason: HOSPADM

## 2021-10-12 RX ORDER — EPHEDRINE SULFATE/0.9% NACL/PF 50 MG/5 ML
SYRINGE (ML) INTRAVENOUS AS NEEDED
Status: DISCONTINUED | OUTPATIENT
Start: 2021-10-12 | End: 2021-10-12 | Stop reason: HOSPADM

## 2021-10-12 RX ORDER — ALBUTEROL SULFATE 0.83 MG/ML
2.5 SOLUTION RESPIRATORY (INHALATION) AS NEEDED
Status: DISCONTINUED | OUTPATIENT
Start: 2021-10-12 | End: 2021-10-12 | Stop reason: HOSPADM

## 2021-10-12 RX ORDER — ONDANSETRON 2 MG/ML
4 INJECTION INTRAMUSCULAR; INTRAVENOUS
Status: DISCONTINUED | OUTPATIENT
Start: 2021-10-12 | End: 2021-10-13 | Stop reason: HOSPADM

## 2021-10-12 RX ORDER — FLUMAZENIL 0.1 MG/ML
0.2 INJECTION INTRAVENOUS
Status: DISCONTINUED | OUTPATIENT
Start: 2021-10-12 | End: 2021-10-12 | Stop reason: HOSPADM

## 2021-10-12 RX ORDER — DEXAMETHASONE SODIUM PHOSPHATE 4 MG/ML
INJECTION, SOLUTION INTRA-ARTICULAR; INTRALESIONAL; INTRAMUSCULAR; INTRAVENOUS; SOFT TISSUE AS NEEDED
Status: DISCONTINUED | OUTPATIENT
Start: 2021-10-12 | End: 2021-10-12 | Stop reason: HOSPADM

## 2021-10-12 RX ORDER — SODIUM CHLORIDE 0.9 % (FLUSH) 0.9 %
5-40 SYRINGE (ML) INJECTION AS NEEDED
Status: DISCONTINUED | OUTPATIENT
Start: 2021-10-12 | End: 2021-10-13 | Stop reason: HOSPADM

## 2021-10-12 RX ORDER — FENTANYL CITRATE 50 UG/ML
25 INJECTION, SOLUTION INTRAMUSCULAR; INTRAVENOUS
Status: DISCONTINUED | OUTPATIENT
Start: 2021-10-12 | End: 2021-10-12 | Stop reason: HOSPADM

## 2021-10-12 RX ORDER — HYDROMORPHONE HYDROCHLORIDE 1 MG/ML
.25-1 INJECTION, SOLUTION INTRAMUSCULAR; INTRAVENOUS; SUBCUTANEOUS
Status: DISCONTINUED | OUTPATIENT
Start: 2021-10-12 | End: 2021-10-12 | Stop reason: HOSPADM

## 2021-10-12 RX ADMIN — METFORMIN HYDROCHLORIDE 500 MG: 500 TABLET, EXTENDED RELEASE ORAL at 18:31

## 2021-10-12 RX ADMIN — ONDANSETRON HYDROCHLORIDE 4 MG: 2 SOLUTION INTRAMUSCULAR; INTRAVENOUS at 14:45

## 2021-10-12 RX ADMIN — FENTANYL CITRATE 25 MCG: 50 INJECTION, SOLUTION INTRAMUSCULAR; INTRAVENOUS at 13:26

## 2021-10-12 RX ADMIN — SODIUM CHLORIDE, POTASSIUM CHLORIDE, SODIUM LACTATE AND CALCIUM CHLORIDE 125 ML/HR: 600; 310; 30; 20 INJECTION, SOLUTION INTRAVENOUS at 13:20

## 2021-10-12 RX ADMIN — FENTANYL CITRATE 25 MCG: 50 INJECTION, SOLUTION INTRAMUSCULAR; INTRAVENOUS at 14:21

## 2021-10-12 RX ADMIN — CEFAZOLIN SODIUM 2 G: 1 POWDER, FOR SOLUTION INTRAMUSCULAR; INTRAVENOUS at 13:26

## 2021-10-12 RX ADMIN — HYDROXYZINE HYDROCHLORIDE 50 MG: 25 TABLET, FILM COATED ORAL at 21:38

## 2021-10-12 RX ADMIN — DEXAMETHASONE SODIUM PHOSPHATE 4 MG: 4 INJECTION, SOLUTION INTRAMUSCULAR; INTRAVENOUS at 14:45

## 2021-10-12 RX ADMIN — PROPOFOL 150 MG: 10 INJECTION, EMULSION INTRAVENOUS at 13:34

## 2021-10-12 RX ADMIN — TRAZODONE HYDROCHLORIDE 200 MG: 100 TABLET ORAL at 20:57

## 2021-10-12 RX ADMIN — Medication 10 MG: at 13:51

## 2021-10-12 RX ADMIN — HYDROCODONE BITARTRATE AND ACETAMINOPHEN 1 TABLET: 5; 325 TABLET ORAL at 18:18

## 2021-10-12 RX ADMIN — FENTANYL CITRATE 25 MCG: 50 INJECTION, SOLUTION INTRAMUSCULAR; INTRAVENOUS at 13:38

## 2021-10-12 RX ADMIN — FENTANYL CITRATE 25 MCG: 50 INJECTION, SOLUTION INTRAMUSCULAR; INTRAVENOUS at 14:16

## 2021-10-12 RX ADMIN — POTASSIUM CHLORIDE, DEXTROSE MONOHYDRATE AND SODIUM CHLORIDE 50 ML/HR: 150; 5; 450 INJECTION, SOLUTION INTRAVENOUS at 17:30

## 2021-10-12 RX ADMIN — HYDROMORPHONE HYDROCHLORIDE 0.5 MG: 1 INJECTION, SOLUTION INTRAMUSCULAR; INTRAVENOUS; SUBCUTANEOUS at 15:40

## 2021-10-12 RX ADMIN — Medication 10 MG: at 14:23

## 2021-10-12 RX ADMIN — ALPRAZOLAM 0.5 MG: 0.5 TABLET ORAL at 20:58

## 2021-10-12 RX ADMIN — FENTANYL CITRATE 25 MCG: 50 INJECTION, SOLUTION INTRAMUSCULAR; INTRAVENOUS at 14:26

## 2021-10-12 RX ADMIN — Medication 10 MG: at 14:09

## 2021-10-12 RX ADMIN — Medication 10 ML: at 17:30

## 2021-10-12 RX ADMIN — Medication 10 ML: at 17:31

## 2021-10-12 RX ADMIN — Medication 10 MG: at 13:47

## 2021-10-12 RX ADMIN — MIDAZOLAM 2 MG: 1 INJECTION, SOLUTION INTRAMUSCULAR; INTRAVENOUS at 13:26

## 2021-10-12 RX ADMIN — FENTANYL CITRATE 25 MCG: 50 INJECTION, SOLUTION INTRAMUSCULAR; INTRAVENOUS at 14:15

## 2021-10-12 NOTE — ANESTHESIA PREPROCEDURE EVALUATION
Relevant Problems   NEUROLOGY   (+) ADD (attention deficit disorder)   (+) PTSD (post-traumatic stress disorder)      CARDIOVASCULAR   (+) Essential hypertension, benign      GASTROINTESTINAL   (+) GERD (gastroesophageal reflux disease)      ENDOCRINE   (+) Hypothyroidism   (+) Type 2 diabetes mellitus with diabetic nephropathy, without long-term current use of insulin (HCC)      PERSONAL HX & FAMILY HX OF CANCER   (+) Invasive lobular carcinoma of right breast in female [facility])       Anesthetic History   No history of anesthetic complications            Review of Systems / Medical History  Patient summary reviewed, nursing notes reviewed and pertinent labs reviewed    Pulmonary  Within defined limits                 Neuro/Psych   Within defined limits           Cardiovascular    Hypertension              Exercise tolerance: >4 METS     GI/Hepatic/Renal     GERD           Endo/Other    Diabetes  Hypothyroidism  Cancer     Other Findings   Comments: PTSD  Breast ca           Physical Exam    Airway  Mallampati: II    Neck ROM: normal range of motion   Mouth opening: Normal     Cardiovascular  Regular rate and rhythm,  S1 and S2 normal,  no murmur, click, rub, or gallop  Rhythm: regular  Rate: normal         Dental  No notable dental hx       Pulmonary  Breath sounds clear to auscultation               Abdominal  GI exam deferred       Other Findings            Anesthetic Plan    ASA: 2  Anesthesia type: general          Induction: Intravenous  Anesthetic plan and risks discussed with: Patient

## 2021-10-12 NOTE — OP NOTES
[facility]  OPERATIVE REPORT    Name:  Giovana Moise  MR#:  [id number]  :  1955  ACCOUNT #:  [de-identified]  DATE OF SERVICE:  10/12/2021    PREOPERATIVE DIAGNOSIS:  Carcinoma of the right breast.    POSTOPERATIVE DIAGNOSIS:  Carcinoma of the right breast.    PROCEDURE PERFORMED:  Right lumpectomy with intraoperative ultrasound and right sentinel lymph node biopsy. SURGEON:  Yesenia Vallejo MD    ASSISTANT:  Laura Llanos    ANESTHESIA:  General.    COMPLICATIONS:  None. SPECIMENS REMOVED:  Right axillary sentinel lymph nodes x3 and right breast mass with margins. IMPLANTS:  None. ESTIMATED BLOOD LOSS:  Minimal.    INDICATIONS:  The patient is a 44-year-old female with a biopsy-proven adenocarcinoma of the right breast.    PROCEDURE:  After lymphoscintigraphy in Radiology, the patient was brought to the operating room. After satisfactory induction of general LMA anesthesia, the patient was prepped and draped in sterile fashion. An axillary incision was made and deepened through subcutaneous tissues with the Bovie cautery, and utilizing the Neoprobe, three sentinel nodes were found in the deep axilla. They were removed and sent for permanent section. Dissection planes were all hemostatic. The wound was anesthetized with 0.5% Marcaine and closed with interrupted 3-0 Vicryl and a running subcuticular 4-0 Monocryl on the skin. Attention was then turned to the breast where an intraoperative ultrasound was performed and the breast was marked. An incision was made in the lower outer quadrant and deepened through subcutaneous tissues with the Bovie cautery. A standard lumpectomy was then performed and additional specimens were oriented and sent to Pathology. The mass was seen with ultrasound and additional anterior margin was obtained. All dissection planes were hemostatic.   Tissue advancement flaps were created over a distance of 6 x 4 cm superiorly and 4 x 2 cm inferiorly for a total tissue advancement of approximately 32 cm2. Separate incisions were made anteriorly in the flaps and they were rotated into the lumpectomy defect and secured with interrupted 3-0 Vicryl sutures. The wound was anesthetized with 0.5% Marcaine. Subcutaneous tissue was reapproximated with interrupted 3-0 Vicryl suture and skin closed with running subcuticular 4-0 Monocryl. The patient tolerated the procedure well with no complications. She was taken to the recovery room in stable condition.       Nilson Brewer MD      [id number]  D:  10/12/2021 15:46  T:  10/12/2021 16:55  JOB #:  [id number]  CC:  Dariusz Echavarria MD

## 2021-10-12 NOTE — ANESTHESIA POSTPROCEDURE EVALUATION
Procedure(s):  RIGHT BREAST LUMPECTOMY WITH ULTRASOUND, RIGHT BREAST SENTINEL NODE BIOPSY  . .    general    Anesthesia Post Evaluation      Multimodal analgesia: multimodal analgesia not used between 6 hours prior to anesthesia start to PACU discharge  Patient location during evaluation: PACU  Patient participation: complete - patient participated  Level of consciousness: awake and alert  Pain score: 5  Pain management: satisfactory to patient  Airway patency: patent  Anesthetic complications: no  Cardiovascular status: hemodynamically stable and acceptable  Respiratory status: acceptable  Hydration status: acceptable  Comments: Patient seen and evaluated; no concerns. Post anesthesia nausea and vomiting:  none      INITIAL Post-op Vital signs:   Vitals Value Taken Time   /50 10/12/21 1710   Temp 36.6 °C (97.9 °F) 10/12/21 1700   Pulse 73 10/12/21 1711   Resp 16 10/12/21 1711   SpO2 93 % 10/12/21 1712   Vitals shown include unvalidated device data.

## 2021-10-12 NOTE — BRIEF OP NOTE
Brief Postoperative Note    Patient: Phil Fraser  YOB: 1955  MRN: [id number]    Date of Procedure: 10/12/2021     Pre-Op Diagnosis: INVASIVE LOBULAR CARCINOMA    Post-Op Diagnosis: Same as preoperative diagnosis. Procedure(s):  RIGHT BREAST LUMPECTOMY WITH ULTRASOUND, RIGHT BREAST SENTINEL NODE BIOPSY  .     Surgeon(s):  Cyrus Jones MD    Surgical Assistant: Surg Asst-1: Romaine Wallace    Anesthesia: General     Estimated Blood Loss (mL): Minimal    Complications: None    Specimens:   ID Type Source Tests Collected by Time Destination   1 : right axillary sentinel node #1 Preservative Axilla  Ronda Finch MD 10/12/2021 1424 Pathology   2 : right axillary sentinel node #2 Nicolette Hernadez MD 10/12/2021 1432 Pathology   3 : right axillary sentinel node #3 Preservative Axilla  Ronda Finch MD 10/12/2021 1433 Pathology   4 : right breast lumpectomy  Preservative Breast  Cyrus Jones MD 10/12/2021 1436 Pathology   5 : right breast anterior margin Preservative Breast  Cyrus Jones MD 10/12/2021 1438 Pathology        Implants: * No implants in log *    Drains: * No LDAs found *    Findings: sent nodes x 3, spec sono pos clip and mass    Electronically Signed by Farzana Merino MD on 08/70/7064 at 3:33 PM

## 2021-10-12 NOTE — H&P
HISTORY OF PRESENT ILLNESS  Grace Cross is a 76 y.o. female. HPI  ESTABLISHED patient here for breast talk for newly diagnosed RIGHT breast cancer. She is here alone but would like to have her daughter on speaker phone.     08/23/21: RIGHT breast bx, LOQ. PATH: ILC, 9mm, overall grade 2, ER+(95%)/CO-/HER2-, Ki-67 15%. Clinical stage 2.  · MammaPrint: High risk, luminal type B, -0.101.     2012: LEFT breast cancer, treated with lumpectomy, radiation, and Tamoxifen x5 years.     Breast imaging-  MRI Results (most recent):  Results from Hospital Encounter encounter on 08/31/21     MRI BREAST BI W WO CONT     Narrative  EXAM:  MRI BREAST BI W WO CONT     INDICATION: New diagnosis of right breast invasive lobular carcinoma. Evaluate  extent of disease. Previous left lumpectomy for carcinoma in 2012.     COMPARISON: Mammography dating back to 8/8/2016. No comparison 1/19/2012. on MRI.     EXAM: MRI of right and left breast without and with IV contrast Gadavist .     TECHNIQUE: MRI breast without and with IV contrast. Bilateral breast MRI was  performed using a dedicated breast coil without compression with the patient in  the prone position. Precontrast T1-weighted images with fat suppression were  obtained followed by bolus injection of 7 mL of Gadavist . Postcontrast dynamic  and high-resolution images were acquired. Axial T2 fat-saturated imaging was  also performed. The images were analyzed using CAD analysis, enhancement curves,  digital subtraction, and 2 and 3 dimensional reconstructions.     FINDINGS:     Background parenchymal enhancement: Moderate.     Lymph nodes: No lymphadenopathy.     Right breast: Irregular enhancing mass with left toe and washout kinetics in the  middle third of the right breast in the 7:00 position contains a biopsy clip and  measures 3.4 x 1.1 x 1.9 cm. No extension to skin. No other suspicious  morphology or enhancement in the right breast. Scattered background foci are  increased. Skin and nipple are within normal limits.     Left breast: There is no suspicious focus of morphology or temporal enhancement. Lumpectomy scar is posterior in the upper outer quadrant. Nipple angulation is  expected postsurgery. No unexpected skin thickening.     Miscellaneous: Hepatic cysts are partially imaged and grossly unchanged. This  does not represent a full evaluation of the liver.     Impression  1. 3.4 cm biopsy-proven carcinoma in the right breast. No evidence of  multicentric disease. 2. No lymphadenopathy. 3. No MRI evidence of malignancy in the posttreatment left breast.     Assessment: ACR BI-RADS category  Right breast: BI-RADS Assessment Category 6: Known biopsy proven malignancy-  Appropriate action should be taken. Left breast: BI-RADS Assessment Category 2: Benign finding.     Recommendation: Surgical and oncologic management. Based on history and imaging  findings, patient is a candidate for breast conservation surgery of the right  breast.     A negative breast MRI examination speaks strongly against invasive cancer down  to a detection threshold of 3 to 5 mm but may not detect some lower grade or in  situ carcinomas. Therefore, routine clinical and mammographic followup are  recommended. A summary portfolio has been created in PACS.              Past Medical History:   Diagnosis Date    ADD (attention deficit disorder) 8/17/2009    Breast cancer ([facility] [address] 2012     Left Breast    Depressive disorder, not elsewhere classified 8/17/2009    Essential hypertension, benign 8/17/2009    History of mammogram 02/17/2020     BI-RADS Category 3    Hypertension      Pap smear for cervical cancer screening 07/28/2016; 01/09/2020     negative, HPV negative; negative, HPV negative    Psychiatric disorder 2007     panic attacks/PTSD    PTSD (post-traumatic stress disorder)      Thyroid disease      Type 2 diabetes mellitus without complication ([facility] [address] 2/68/4007    Unspecified hypothyroidism 8/17/2009         Past Surgical History:   Procedure Laterality Date    HX BREAST LUMPECTOMY Left 2012    HX GYN         c section    HX HEENT   1992     partial thyroidectomy    HX TONSILLECTOMY        HX UROLOGICAL   1992     Bladder suspension    [facility] BREAST SURGERY PROCEDURE UNLISTED   January 2012     left breast biopsy         Social History            Socioeconomic History    Marital status:        Spouse name: Not on file    Number of children: Not on file    Years of education: Not on file    Highest education level: Not on file   Occupational History    Not on file   Tobacco Use    Smoking status: Never Smoker    Smokeless tobacco: Never Used   Substance and Sexual Activity    Alcohol use: No       Alcohol/week: 0.0 standard drinks    Drug use: No    Sexual activity: Never       Partners: Male   Other Topics Concern    Not on file   Social History Narrative    Not on file      Social Determinants of Health          Financial Resource Strain:     Difficulty of Paying Living Expenses:    Food Insecurity:     Worried About Running Out of Food in the Last Year:     [address] in the Last Year:    Transportation Needs:     Lack of Transportation (Medical):      Lack of Transportation (Non-Medical):    Physical Activity:     Days of Exercise per Week:     Minutes of Exercise per Session:    Stress:     Feeling of Stress :    Social Connections:     Frequency of Communication with Friends and Family:     Frequency of Social Gatherings with Friends and Family:     Attends Christianity Services:     Active Member of Clubs or Organizations:     Attends Club or Organization Meetings:     Marital Status:    Intimate Partner Violence:     Fear of Current or Ex-Partner:     Emotionally Abused:     Physically Abused:     Sexually Abused:                 Current Outpatient Medications on File Prior to Visit   Medication Sig Dispense Refill    rOPINIRole (REQUIP) 4 mg tab TAB TAKE 1 TABLET BY MOUTH EVERY DAY 90 Tablet 0    ALPRAZolam (XANAX) 0.5 mg tablet Take 1 Tablet by mouth three (3) times daily as needed for Anxiety (take as directed). Max Daily Amount: 1.5 mg. Indications: anxious 12 Tablet 0    sertraline (ZOLOFT) 50 mg tablet Take 50 mg by mouth daily.        pregabalin (LYRICA) 200 mg capsule TAKE 1 CAPSULE BY MOUTH THREE (3) TIMES DAILY. MAX DAILY AMOUNT: 600 MG. 90 Capsule 5    losartan (COZAAR) 100 mg tablet TAKE 1 TABLET BY MOUTH EVERY DAY 90 Tablet 1    metFORMIN ER (GLUCOPHAGE XR) 500 mg tablet TAKE TWO TABLETS BY MOUTH EVERY MORNING AND 1 TABLET AT NIGHT 270 Tablet 1    lidocaine-prilocaine (EMLA) topical cream APPLY TO AFFECTED AREA(S) AS NEEDED FOR PAIN 30 g 2    levothyroxine (SYNTHROID) 137 mcg tablet TAKE 1 TABLET BY MOUTH EVERY DAY 90 Tablet 2    guanFACINE ER (INTUNIV) 4 mg Tb24 ER tablet Take 4 mg by mouth daily.        methocarbamoL (Robaxin) 500 mg tablet Take 1 Tab by mouth three (3) times daily as needed for Muscle Spasm(s). 10 Tab 0    diclofenac (VOLTAREN) 1 % gel Apply  to affected area four (4) times daily. 50 g 0    meloxicam (MOBIC) 15 mg tablet TAKE 1 TABLET BY MOUTH EVERY DAY 90 Tab 1    pantoprazole (PROTONIX) 40 mg tablet TAKE 1 TABLET BY MOUTH EVERY DAY 90 Tab 1    clonazePAM (KlonoPIN) 1 mg tablet TAKE 1 TAB BY MOUTH THREE (3) TIMES DAILY. MAX DAILY AMOUNT: 3 MG. 90 Tab 0    FreeStyle Yaquelin 14 Day Sensor kit APPLY 1 SENSOR EVERY 14 DAYS TO CHECK BLOOD SUGARS DX:E11.9 2 Kit 3    lamoTRIgine (LaMICtal) 200 mg tablet Take 200 mg by mouth daily.        albuterol (PROVENTIL HFA, VENTOLIN HFA, PROAIR HFA) 90 mcg/actuation inhaler Take 2 Puffs by inhalation every four (4) hours as needed for Wheezing. 1 Inhaler 1    simvastatin (ZOCOR) 10 mg tablet TAKE 1 TABLET BY MOUTH EVERY DAY AT NIGHT 90 Tab 0    lancets misc Use to test blood sugars once daily.  Dx:E11.9 100 Each 3    flash glucose scanning reader (FREESTYLE YAQUELIN 14 DAY READER) [facility] Use to check blood sugars throughout the day 1 Each 0    ONETOUCH ULTRA BLUE TEST STRIP strip TEST SUGARS ONCE DAILY AS DIRECTED 50 Strip 0    traZODone (DESYREL) 100 mg tablet TAKE 3 TABLETS BY MOUTH AT BEDTIME (Patient taking differently: Take 200 mg by mouth nightly.) 270 Tab 1    DAILY MULTI-VITAMIN PO Take 1 Tab by mouth daily.          No current facility-administered medications on file prior to visit.         No Known Allergies     OB History    No obstetric history on file.       Obstetric Comments   Menarche: 10   LMP:age 54. # of Children:  3 Age at Delivery of First Child: 24.   Hysterectomy/oophorectomy: no/no. Breast Bx: left 2012. Hx of Breast Feeding: no  BCP: yes [id number]. Hormone therapy: yes                    ROS     Physical Exam     Heart RRR  Lungs Clear      ASSESSMENT and PLAN      ICD-10-CM ICD-9-CM     1. Invasive lobular carcinoma of right breast in female [facility])  C50.911 174. 9     2. Breast mass, right  N63.10 611.72     3. History of left breast cancer  Z85.3 V10.3        Pt presents for consultation for treatment of RIGHT breast ILC, ER+/MO-/HER2-, Ki-67 15%, clinical stage 2.     We had a long discussion of options for treatment. The patient was accompanied by her daughter on speakerphone during this encounter, and over half the time was spent on counseling and coordination of care. We discussed in depth the pathology and MRI results, and the need for treatment. The goals of treatment are to treat the breast, and to reduce risk of local or distant recurrence.     Discussed treatment options with risks, complications, benefits, and limitations, including lumpectomy with XRT, and mastectomy with optional reconstruction, both having the same cure rate. Pt notes preference for lumpectomy, and she is a good candidate for this option. Explained the importance of negative margins, and the need for re-excision in the case of positive margins.  Will plan to mobilize breast tissue during lumpectomy to minimize cosmetic defect. Also discussed benefit and technique of SLNBx of 3-4 LNs.    We also covered risk reduction strategies including XRT, chemotherapy, and hormonal therapy with estrogen blocker. Discussed XRT, which will likely be 5 days a week for 4 weeks. Discussed MammaPrint results to help determine whether pt will benefit from chemotherapy. With high risk MammaPrint, will further evaluate need for chemo based on final tumor size and LN involvement. Discussed estrogen blocker for further risk reduction for ER+ disease.     We also discussed the pts questions and concerns. Pt is concerned about staying at home following surgery, so will consider keeping pt in hospital overnight for observation following surgery. Pt should feel free to call Edie Mendoza, nurse navigator, with any further questions.     Pt has elected to proceed with lumpectomy. She understands and consents to surgery. Will schedule for the near future at [facility], and scheduling will f/u with the date. This plan was reviewed with the patient and patient agrees.  All questions were answered.

## 2021-10-13 VITALS
OXYGEN SATURATION: 96 % | WEIGHT: 156.75 LBS | DIASTOLIC BLOOD PRESSURE: 63 MMHG | TEMPERATURE: 98.2 F | SYSTOLIC BLOOD PRESSURE: 111 MMHG | RESPIRATION RATE: 19 BRPM | HEART RATE: 85 BPM | BODY MASS INDEX: 26.76 KG/M2 | HEIGHT: 64 IN

## 2021-10-13 PROCEDURE — 74011250637 HC RX REV CODE- 250/637: Performed by: SURGERY

## 2021-10-13 PROCEDURE — 74011250636 HC RX REV CODE- 250/636: Performed by: SURGERY

## 2021-10-13 PROCEDURE — 99218 HC RM OBSERVATION: CPT

## 2021-10-13 RX ORDER — MELOXICAM 15 MG/1
TABLET ORAL
Qty: 90 TABLET | Refills: 1 | Status: SHIPPED | OUTPATIENT
Start: 2021-10-13 | End: 2022-04-18 | Stop reason: ALTCHOICE

## 2021-10-13 RX ADMIN — ONDANSETRON 4 MG: 2 INJECTION INTRAMUSCULAR; INTRAVENOUS at 13:28

## 2021-10-13 RX ADMIN — METFORMIN HYDROCHLORIDE 500 MG: 500 TABLET, EXTENDED RELEASE ORAL at 09:36

## 2021-10-13 RX ADMIN — LOSARTAN POTASSIUM 100 MG: 50 TABLET, FILM COATED ORAL at 09:36

## 2021-10-13 RX ADMIN — HYDROXYZINE HYDROCHLORIDE 50 MG: 25 TABLET, FILM COATED ORAL at 10:34

## 2021-10-13 RX ADMIN — HYDROCODONE BITARTRATE AND ACETAMINOPHEN 1 TABLET: 5; 325 TABLET ORAL at 10:34

## 2021-10-13 RX ADMIN — Medication 10 ML: at 05:08

## 2021-10-13 RX ADMIN — SERTRALINE 50 MG: 50 TABLET, FILM COATED ORAL at 09:36

## 2021-10-13 RX ADMIN — HYDROCODONE BITARTRATE AND ACETAMINOPHEN 1 TABLET: 5; 325 TABLET ORAL at 05:06

## 2021-10-13 RX ADMIN — LAMOTRIGINE 200 MG: 100 TABLET ORAL at 09:35

## 2021-10-13 RX ADMIN — ONDANSETRON 4 MG: 2 INJECTION INTRAMUSCULAR; INTRAVENOUS at 10:38

## 2021-10-13 NOTE — PROGRESS NOTES
10/13/2021  11:39 AM  Observation notice provided in writing to patient and/or caregiver as well as verbal explanation of the policy. Patients who are in outpatient status also receive the Observation notice. Patient has received notice and or patient representative has received via secure email, fax, or certified mail based on patient representative's preference.      Care Management Interventions  Support Systems: Spouse/Significant Other  Discharge Location  Discharge Placement: Home with family assistance

## 2021-10-13 NOTE — PERIOP NOTES
0230  Bedside shift change report given to [facility] (oncoming nurse) by Bria Enrique (offgoing nurse). Report included the following information SBAR, Kardex, Intake/Output, MAR, Recent Results and Cardiac Rhythm NSR.     0730  Bedside shift change report given to Taniya Medrano  (oncoming nurse) by [facility] (offgoing nurse).  Report included the following information SBAR, Kardex, Intake/Output, MAR, Recent Results and Cardiac Rhythm NSR

## 2021-10-13 NOTE — PERIOP NOTES
Pt has N/V after eating lunch. Medicated slightly early with Zofran and instructed to wait 8 hrs prior to any home doses if applicable. Vomiting resolved. IV removed. Discharged via wheelchair with emesis bag and queezease. Jeffry Méndez

## 2021-10-13 NOTE — DISCHARGE INSTRUCTIONS
Discharge Instructions from Dr. Yesica Willis    · I will call you with the pathology results, typically within 1 week from today. · You may shower, but no hot tubs, swimming pools, or baths until your incision is healed. · No heavy lifting with the affected extremity (nothing greater than 5 pounds), and limit its use for the next 4-5 days. · You may use an ice pack for comfort for the next couple of days, but do not place ice directly on the skin. Rather, use a towel or clothing to serve as a barrier between skin and ice to prevent injury. · If I placed a drain, follow the drain instructions provided, especially as you keep a record of the drain output. · Follow medication instructions carefully. · Watch for signs of infection as listed below. · Redness  · Swelling  · Drainage from the incision or from your nipple that appears infected  · Fever over 101 degrees for consecutive readings, or over 99.5 if you are currently undergoing chemotherapy. · Call our office (number is below) for a follow-up appointment. · If you have any problems, our phone number is [phone]. DISCHARGE SUMMARY from your Nurse      PATIENT INSTRUCTIONS    After general anesthesia or intravenous sedation, for 24 hours or while taking prescription Narcotics:  · Limit your activities  · Do not drive and operate hazardous machinery  · Do not make important personal or business decisions  · Do  not drink alcoholic beverages  · If you have not urinated within 8 hours after discharge, please contact your surgeon on call.     Report the following to your surgeon:  · Excessive pain, swelling, redness or odor of or around the surgical area  · Temperature over 100.5  · Nausea and vomiting lasting longer than 4 hours or if unable to take medications  · Any signs of decreased circulation or nerve impairment to extremity: change in color, persistent  numbness, tingling, coldness or increase pain  · Any questions      GOOD HELP TO FIGHT AN INFECTION  Here are a few tip to help reduce the chance of getting an infection after surgery:   Wash Your Hands   Good handwashing is the most important thing you and your caregiver can do.  Wash before and after caring for any wounds. Dry your hand with a clean towel.  Wash with soap and water for at least 20 seconds. A TIP: sing the \"Happy Birthday\" song through one time while washing to help with the timing.  Use a hand  in between washings.  Shower   When your surgeon says it is OK to take a shower, use a new bar of antibacterial soap (if that is what you use, and keep that bar of soap ONLY for your use), or antibacterial body wash.  Use a clean wash cloth or sponge when you bathe.  Dry off with a clean towel  after every bath - be careful around any wounds, skin staples, sutures or surgical glue over/on wounds.  Do not enter swimming pools, hot tubs, lakes, rivers and/or ocean until wounds are healed and your doctor/surgeon says it is OK.  Use Clean Sheets   Sleep on freshly laundered sheets after your surgery.  Keep the surgery site covered with a clean, dry bandage (if instructed to do so). If the bandage becomes soiled, reapply a new, dry, clean bandage.  Do not allow pets to sleep with you while your wound is healing.  Lifestyle Modification and Controlling Your Blood Sugar   Smoking slows wound healing. Stop smoking and limit exposure to second-hand smoke.  High blood sugar slows wound healing. Eat a well-balanced diet to provide proper nutrition while healing   Monitor your blood sugar (if you are a diabetic) and take your medications as you are suppose to so you can control you blood sugar after surgery. COUGH AND DEEP BREATHE    Breathing deeply and coughing are very important exercises to do after surgery. Deep breathing and coughing open the little air tubes and air sacks in your lungs.        You take deep breaths every day. You may not even notice - it is just something you do when you sigh or yawn. It is a natural exercise you do to keep these air passages open. After surgery, take deep breaths and cough, on purpose. DIRECTIONS:  · Take 10 to 15 slow deep breaths every hour while awake. · Breathe in deeply, and hold it for 2 seconds. · Exhale slowly through puckered lips, like blowing up a balloon. · After every 4th or 5th deep breath, hug your pillow to your chest or belly and give a hard, deep cough. Yes, it will probably hurt. But doing this exercise is a very important part of healing after surgery. Take your pain medicine to help you do this exercise without too much pain. Coughing and deep breathing help prevent bronchitis and pneumonia after surgery. If you had chest or belly surgery, use a pillow as a \"hug darío\" and hold it tightly to your chest or belly when you cough. ANKLE PUMPS    Ankle pumps increase the circulation of oxygenated blood to your lower extremities and decrease your risk for circulation problems such as blood clots. They also stretch the muscles, tendons and ligaments in your foot and ankle, and prevent joint contracture in the ankle and foot, especially after surgeries on the legs. It is important to do ankle pump exercises regularly after surgery because immobility increases your risk for developing a blood clot. Your doctor may also have you take an Aspirin for the next few days as well. If your doctor did not ask you to take an Aspirin, consult with him before starting Aspirin therapy on your own. The exercise is quite simple. · Slowly point your foot forward, feeling the muscles on the top of your lower leg stretch, and hold this position for 5 seconds. · Next, pull your foot back toward you as far as possible, stretching the calf muscles, and hold that position for 5 seconds.                  · Repeat with the other foot.  · Perform 10 repetitions every hour while awake for both ankles if possible (down and then up with the foot once is one repetition). You should feel gentle stretching of the muscles in your lower leg when doing this exercise. If you feel pain, or your range of motion is limited, don't push too hard. Only go the limit your joint and muscles will let you go. If you have increasing pain, progressively worsening leg warmth or swelling, STOP the exercise and call your doctor. MEDICATION AND   SIDE EFFECT GUIDE    The [facility] MEDICATION AND SIDE EFFECT GUIDE was provided to the PATIENT AND CARE PROVIDER. Information provided includes instruction about drug purpose and common side effects for the following medications:   · Dr. Keagan Gamble        These are general instructions for a healthy lifestyle:    *   Please give a list of your current medications to your Primary Care Provider. *   Please update this list whenever your medications are discontinued, doses are changed, or new medications (including over-the-counter products) are added. *   Please carry medication information at all times in case of emergency situations. About Smoking  No smoking / No tobacco products  Avoid exposure to second hand smoke     Surgeon General's Warning:  Quitting smoking now greatly reduces serious risk to your health. Obesity, smoking, and sedentary lifestyle greatly increases your risk for illness and disease. A healthy diet, regular physical exercise & weight monitoring are important for maintaining a healthy lifestyle. Congestive Heart Failure  You may be retaining fluid if you have a history of heart failure or if you experience any of the following symptoms:  Weight gain of 3 pounds or more overnight or 5 pounds in a week, increased swelling in your hands or feet or shortness of breath while lying flat in bed.   Please call your doctor as soon as you notice any of these symptoms; do not wait until your next office visit. Learning About Coronavirus [phone])  Coronavirus [phone]): Overview  What is coronavirus (COVID-19)? The coronavirus disease (COVID-19) is caused by a virus. It is an illness that was first found in [place], [place], in December 2019. It has since spread worldwide. The virus can cause fever, cough, and trouble breathing. In severe cases, it can cause pneumonia and make it hard to breathe without help. It can cause death. Coronaviruses are a large group of viruses. They cause the common cold. They also cause more serious illnesses like Middle East respiratory syndrome (MERS) and severe acute respiratory syndrome (SARS). COVID-19 is caused by a novel coronavirus. That means it's a new type that has not been seen in people before. This virus spreads person-to-person through droplets from coughing and sneezing. It can also spread when you are close to someone who is infected. And it can spread when you touch something that has the virus on it, such as a doorknob or a tabletop. What can you do to protect yourself from coronavirus (COVID-19)? The best way to protect yourself from getting sick is to:  · Avoid areas where there is an outbreak. · Avoid contact with people who may be infected. · Wash your hands often with soap or alcohol-based hand sanitizers. · Avoid crowds and try to stay at least 6 feet away from other people. · Wash your hands often, especially after you cough or sneeze. Use soap and water, and scrub for at least 20 seconds. If soap and water aren't available, use an alcohol-based hand . · Avoid touching your mouth, nose, and eyes. What can you do to avoid spreading the virus to others? To help avoid spreading the virus to others:  · Cover your mouth with a tissue when you cough or sneeze. Then throw the tissue in the trash. · Use a disinfectant to clean things that you touch often. · Stay home if you are sick or have been exposed to the virus. Don't go to school, work, or public areas. And don't use public transportation. · If you are sick:  ? Leave your home only if you need to get medical care. But call the doctor's office first so they know you're coming. And wear a face mask, if you have one.  ? If you have a face mask, wear it whenever you're around other people. It can help stop the spread of the virus when you cough or sneeze. ? Clean and disinfect your home every day. Use household  and disinfectant wipes or sprays. Take special care to clean things that you grab with your hands. These include doorknobs, remote controls, phones, and handles on your refrigerator and microwave. And don't forget countertops, tabletops, bathrooms, and computer keyboards. When to call for help  Call 911 anytime you think you may need emergency care. For example, call if:  · You have severe trouble breathing. (You can't talk at all.)  · You have constant chest pain or pressure. · You are severely dizzy or lightheaded. · You are confused or can't think clearly. · Your face and lips have a blue color. · You pass out (lose consciousness) or are very hard to wake up. Call your doctor now if you develop symptoms such as:  · Shortness of breath. · Fever. · Cough. If you need to get care, call ahead to the doctor's office for instructions before you go. Make sure you wear a face mask, if you have one, to prevent exposing other people to the virus. Where can you get the latest information? The following health organizations are tracking and studying this virus. Their websites contain the most up-to-date information. Joan Pham also learn what to do if you think you may have been exposed to the virus. · U.S. Centers for Disease Control and Prevention (CDC): The CDC provides updated news about the disease and travel advice. The website also tells you how to prevent the spread of infection.  www.cdc.gov  · World Health Organization [facility]): WHO offers information about the virus outbreaks. WHO also has travel advice. www.who.int  Current as of: April 1, 2020               Content Version: 12.4  © 2006-2020 Health[employer] Incorporated. Care instructions adapted under license by your healthcare professional. If you have questions about a medical condition or this instruction, always ask your healthcare professional. [address] any warranty or liability for your use of this information. The discharge information has been reviewed with the {PATIENT PARENT GUARDIAN:39394}. Any questions and concerns from the {PATIENT PARENT GUARDIAN:37752} have been addressed. The {PATIENT PARENT GUARDIAN:13100} verbalized understanding. CONTENTS FOUND IN YOUR DISCHARGE ENVELOPE:  [x]     Surgeon and Hospital Discharge Instructions  [x]     [facility] Care Provider Card  [x]     Medication & Side Effect Guide            (your newly prescribed medications have been marked/highlighted showing the most common side effects from   the classes of drugs on your prescriptions)  [x]     Medication Prescription(s) x *** ( [x] These have been sent electronically to your pharmacy by your surgeon,   - OR -       your surgeon has already provided these to you during a previous/pre-op office visit)    The following personal items collected during your admission are returned to you:   Dental Appliance:    Vision: Visual Aid: Glasses  Hearing Aid:    Jewelry: Jewelry: None  Clothing: Clothing: Footwear, Pants, Shirt, Undergarments  Other Valuables:  Other Valuables: None  Valuables sent to safe:

## 2021-10-13 NOTE — PERIOP NOTES
Report received from [facility], pt care assumed at this time. Added report that Trae Nesbitt completed fingerstick on pt, result 277 but adds that pt has been eating all evening and consumed 3 ice creams. 2200 - Pt awake, alert, assessed, denies pain, taking PO, talkative, pleasant, VSS, denies needs at this time, will monitor. 2300 - Pt ambulatory to restroom with upright and steady gait, no pain, no other needs voiced. 0130 - No change, pt resting, call light in reach. 0215 - Report to [address] [place] [address], pt care transferred at this time.

## 2021-10-14 ENCOUNTER — PATIENT OUTREACH (OUTPATIENT)
Dept: CASE MANAGEMENT | Age: 66
End: 2021-10-14

## 2021-10-14 NOTE — Clinical Note
Problem: Safety  Goal: Will remain free from injury  Outcome: PROGRESSING AS EXPECTED    Pt ambulates independently in room, gait is steady. Reminded pt to use call light for any assistance from staff, pt verbalized understanding.      Problem: Venous Thromboembolism (VTW)/Deep Vein Thrombosis (DVT) Prevention:  Goal: Patient will participate in Venous Thrombosis (VTE)/Deep Vein Thrombosis (DVT)Prevention Measures  Outcome: PROGRESSING AS EXPECTED    Pt refuses lovenox inj daily for DVT prevention. Pt ambulates independently in room.     Problem: Fluid Volume:  Goal: Will maintain balanced intake and output  Outcome: PROGRESSING AS EXPECTED     Pt is maintaining adequate PO fluids. Will continue to monitor I&Os.    Please print and mail. Thanks!

## 2021-10-14 NOTE — LETTER
Dear Gerardo Sanchez    My name is Ludwin Burnett and I am a Care Transition Nurse who partners with Dr. Rubi Arthur to improve patients' health. I've been trying to reach you via phone to let you know that, as a member of your care team, I will work with other providers involved in your care, offer education for your specific health conditions, and connect you with more resources as needed. This program is designed to provide you with the opportunity to have a ([address] [facility]/[address]) care manager partner with you for the following situations:     1) if you come home from the hospital or emergency room   2) to help manage your disease   3) when you would like assistance coordinating services or appointments    This added support is provided at no additional cost to you. My primary focus is to help you achieve specific goals and improve your health. Please call me at [phone] to further discuss how I can support your health care needs.     Sincerely,    BETHANY Hernandez  Care Transitions Nurse  [phone]

## 2021-10-14 NOTE — PROGRESS NOTES
10/14/2021  Initial outreach attempt unsuccessful. LMTCB. Care Transitions Initial Call    Call within 2 business days of discharge: Yes     Patient: Ruth Wong Patient : 1955 MRN: [id number]    Last Discharge [address]       Complaint Diagnosis Description Type Department Provider    10/12/21  Malignant neoplasm of right female breast, unspecified estrogen receptor status, unspecified site of breast [facility]) Admission (Discharged) Cristiane Escobar MD          10/15/2021  Second outreach attempt unsuccessful, LMTCB  Letter mailed    Care Transitions Outreach Attempt    Call within 2 business days of discharge: Yes   Attempted to reach patient for transitions of care follow up. Unable to reach patient. Patient: Ruth Wong Patient : 1955 MRN: [id number]    Last Discharge [address]       Complaint Diagnosis Description Type Department Provider    10/12/21  Malignant neoplasm of right female breast, unspecified estrogen receptor status, unspecified site of breast [facility]) Admission (Discharged) Jase Zafar MD            Was this an external facility discharge?  No     Noted following upcoming appointments from discharge chart review:   [facility] Jaime Gee follow up appointment(s):   Future Appointments   Date Time Provider Jeanna Natarajan     4:69 AM Jase Finch MD V[facility] BS AMB   2021 11:00 AM Brenna Austin MD NEUROW[facility] BS AMB     Non-BS follow up appointment(s):

## 2021-10-18 ENCOUNTER — PATIENT OUTREACH (OUTPATIENT)
Dept: CASE MANAGEMENT | Age: 66
End: 2021-10-18

## 2021-10-18 NOTE — PROGRESS NOTES
Care Transitions Initial Call    Call within 2 business days of discharge: Yes     Patient: Haim Zhao Patient : 1955 MRN: [id number]    Last Discharge [address]       Complaint Diagnosis Description Type Department Provider    10/12/21  Malignant neoplasm of right female breast, unspecified estrogen receptor status, unspecified site of breast [facility]) Admission (Discharged) Jackalyn Gottron, Jorge Mejia MD          Was this an external facility discharge? No     Challenges to be reviewed by the provider   Additional needs identified to be addressed with provider:   Swollen R breast- applying ice, no evidence of infection       Method of communication with provider : none    Discussed COVID-19 related testing which was available at this time. Test results were negative. Patient informed of results, if available? yes     Advance Care Planning:   Does patient have an Advance Directive:  not on file    Inpatient Readmission Risk score: No data recorded  Was this a readmission? no   Patient stated reason for the admission: planned surgery    Patients top risk factors for readmission: ineffective coping, medical condition-R breast cancer, support system and caregiver stress   Interventions to address risk factors: Scheduled appointment with Specialist- Surgeon and Obtained and reviewed discharge summary and/or continuity of care documents    Care Transition Nurse (CTN) contacted the patient by telephone to perform post hospital discharge assessment. Verified name and  with patient as identifiers. Provided introduction to self, and explanation of the CTN role. CTN reviewed discharge instructions, medical action plan and red flags with patient who verbalized understanding. Were discharge instructions available to patient? yes.  Reviewed appropriate site of care based on symptoms and resources available to patient including: PCP and Specialist. Patient given an opportunity to ask questions and does not have any further questions or concerns at this time. The patient agrees to contact the PCP office for questions related to their healthcare. Medication reconciliation was performed with patient, who verbalizes understanding of administration of home medications. Referral to Pharm D needed: no     Home Health/Outpatient orders at discharge: none    Durable Medical Equipment ordered at discharge: None    Was patient discharged with a pulse oximeter? no. Discussed and confirmed pulse oximeter discharge instructions and when to notify provider or seek emergency care. Discussed follow-up appointments. If no appointment was previously scheduled, appointment scheduling offered: yes. Pt declined. States she will arrange fu appt with PCP after appt with Surgeon Is follow up appointment scheduled within 7 days of discharge? no.   1215 Jaime Gee follow up appointment(s):   Future Appointments   Date Time Provider Jeanna Natarajan   20/0/8247  8:53 AM Alyssa Finch MD [facility] BS AMB   11/5/2021 11:00 AM Mila Escalona  [facility]     Non-[facility] follow up appointment(s): NA    Plan for follow-up call in 7-10 days based on severity of symptoms and risk factors. Plan for next call: symptom management-swelling to R breast  CTN provided contact information for future needs. Goals Addressed                 This Visit's Progress     Attend follow up appointments on schedule          10/18/21   Will attend follow up appt with Surgeon scheduled 11/1   Will attend new patient appt with mental counselor scheduled 10/25       Prevent complications post hospitalization. 10/18/21   Patient will monitor for s/sx of infection and will report to surgeon   Pt will remain out of the hospital or ER for remainder of AMANDA period.

## 2021-10-20 ENCOUNTER — TELEPHONE (OUTPATIENT)
Dept: SURGERY | Age: 66
End: 2021-10-20

## 2021-10-25 ENCOUNTER — NURSE NAVIGATOR (OUTPATIENT)
Dept: CASE MANAGEMENT | Age: 66
End: 2021-10-25

## 2021-10-25 NOTE — NURSE NAVIGATOR
[address]  Breast Navigator Encounter    Name: Adonay Lopez  Age: 78 y.o.  : 1955  Diagnosis: Right breast ILC; ER+/WI-/HER2-; 0/3 LN; High risk Mammaprint    Encounter type:  []Patient Initiated  [x]Navigator Follow-up []Pre-op  []Post-op  []Check-in Prior to First Treatment []Treatment Modality Change  []Survivorship Transition []Other:     Narrative:   Called patient and left VM regarding Radiation Oncology consult and instructions from Dr. Anny Waite for the breast itchiness.          GRETCHEN CastorenaN, RN, [facility]  Oncology Breast Navigator    [address]  [address]  W: [phone]  F: [phone]  [email or web address]   Good Help to Those in [facility]

## 2021-10-25 NOTE — NURSE NAVIGATOR
[facility]  Breast Navigator Encounter    Name: Ashkan Toro  Age: 78 y.o.  : 1955  Diagnosis: Right breast ILC; ER+/IN-/HER2-; 0/3 LN; High risk Mammaprint    Encounter type:  []Patient Initiated  [x]Navigator Follow-up []Pre-op  []Post-op  []Check-in Prior to First Treatment []Treatment Modality Change  []Survivorship Transition []Other:     Narrative:   Called pt to check in and discuss upcoming med/onc consult. No answer at this time. Left VM.          Carroll Garner, GRETCHENN, RN, [facility]  Oncology Breast Navigator    [address]  W: [phone]  F: [phone]  [email or web address]   Good Help to Those in [facility]

## 2021-10-25 NOTE — NURSE NAVIGATOR
[address]  Breast Navigator Encounter    Name: Ashkan Toro  Age: 78 y.o.  : 1955  Diagnosis: Right breast ILC; ER+/HI-/HER2-; 0/3 LN; High risk Mammaprint    Encounter type:  []Patient Initiated  [x]Navigator Follow-up []Pre-op  []Post-op  []Check-in Prior to First Treatment []Treatment Modality Change  []Survivorship Transition []Other:     Narrative:   Ms. Matt Cordero returned call. We discussed upcoming appointment with Dr. Ignacio Carter and what that consult would entail. She confirmed that appointment as 10/27 at 4:45pm. Ms. Matt Cordero inquired about a Radiation consult. Per chart review, a referral has been placed to Dr. Royanne Klinefelter.  has not yet reached out to patient, however team at [facility] is aware. Ms. Matt Cordero also shared that she is experiencing breast itchiness and is wondering if there is anything she can apply or take to ease this discomfort. She states it is below the nipple and more internal than superficial. She states it is not related to her garments, position, or the incision. She states the incision is healing well, no redness or drainage. I forwarded her concern to Dr. Timothy Warner and team. Will update Ms. Sykes when able.          Carroll Garner, BSN, RN, [facility]  Oncology Breast Navigator    [address]  [address]  W: [phone]  F: [phone]  [email or web address]   Good Help to Those in [facility]

## 2021-10-26 DIAGNOSIS — C50.911 MALIGNANT NEOPLASM OF RIGHT FEMALE BREAST, UNSPECIFIED ESTROGEN RECEPTOR STATUS, UNSPECIFIED SITE OF BREAST (HCC): Primary | ICD-10-CM

## 2021-10-27 ENCOUNTER — PATIENT OUTREACH (OUTPATIENT)
Dept: CASE MANAGEMENT | Age: 66
End: 2021-10-27

## 2021-10-27 ENCOUNTER — OFFICE VISIT (OUTPATIENT)
Dept: ONCOLOGY | Age: 66
End: 2021-10-27
Payer: MEDICARE

## 2021-10-27 VITALS
HEIGHT: 64 IN | OXYGEN SATURATION: 92 % | SYSTOLIC BLOOD PRESSURE: 137 MMHG | WEIGHT: 157 LBS | HEART RATE: 104 BPM | TEMPERATURE: 98.7 F | RESPIRATION RATE: 18 BRPM | BODY MASS INDEX: 26.8 KG/M2 | DIASTOLIC BLOOD PRESSURE: 70 MMHG

## 2021-10-27 DIAGNOSIS — Z78.0 POSTMENOPAUSAL: ICD-10-CM

## 2021-10-27 DIAGNOSIS — C50.911 INVASIVE LOBULAR CARCINOMA OF RIGHT BREAST IN FEMALE (HCC): Primary | ICD-10-CM

## 2021-10-27 PROCEDURE — G8536 NO DOC ELDER MAL SCRN: HCPCS | Performed by: INTERNAL MEDICINE

## 2021-10-27 PROCEDURE — G8432 DEP SCR NOT DOC, RNG: HCPCS | Performed by: INTERNAL MEDICINE

## 2021-10-27 PROCEDURE — 3017F COLORECTAL CA SCREEN DOC REV: CPT | Performed by: INTERNAL MEDICINE

## 2021-10-27 PROCEDURE — 1090F PRES/ABSN URINE INCON ASSESS: CPT | Performed by: INTERNAL MEDICINE

## 2021-10-27 PROCEDURE — G8754 DIAS BP LESS 90: HCPCS | Performed by: INTERNAL MEDICINE

## 2021-10-27 PROCEDURE — G8427 DOCREV CUR MEDS BY ELIG CLIN: HCPCS | Performed by: INTERNAL MEDICINE

## 2021-10-27 PROCEDURE — G8752 SYS BP LESS 140: HCPCS | Performed by: INTERNAL MEDICINE

## 2021-10-27 PROCEDURE — 1101F PT FALLS ASSESS-DOCD LE1/YR: CPT | Performed by: INTERNAL MEDICINE

## 2021-10-27 PROCEDURE — G8400 PT W/DXA NO RESULTS DOC: HCPCS | Performed by: INTERNAL MEDICINE

## 2021-10-27 PROCEDURE — G8419 CALC BMI OUT NRM PARAM NOF/U: HCPCS | Performed by: INTERNAL MEDICINE

## 2021-10-27 PROCEDURE — 99205 OFFICE O/P NEW HI 60 MIN: CPT | Performed by: INTERNAL MEDICINE

## 2021-10-27 PROCEDURE — G9899 SCRN MAM PERF RSLTS DOC: HCPCS | Performed by: INTERNAL MEDICINE

## 2021-10-27 RX ORDER — EXEMESTANE 25 MG/1
25 TABLET ORAL DAILY
Qty: 30 TABLET | Refills: 2 | Status: SHIPPED | OUTPATIENT
Start: 2021-10-27 | End: 2021-10-27

## 2021-10-27 RX ORDER — EXEMESTANE 25 MG/1
25 TABLET ORAL DAILY
Qty: 90 TABLET | Refills: 3 | Status: SHIPPED | OUTPATIENT
Start: 2021-10-27 | End: 2022-01-25

## 2021-10-27 NOTE — PROGRESS NOTES
Cancer [place] at [address], [address] [facility] 100[address] [facility]  Rhea Rodriguez: [phone]  F: [phone]      Reason for Visit:   Afshan Dunn is a 78 y.o. female who is seen in consultation at the request of Dr. Ryan Granados for evaluation of therapy for breast cancer. Rad onc:  Dr. Yousif Wiggins    Treatment History:   · 1/25/2012 left breast lumpectomy:  [address] with mixed ductal and lobular features, 2.3 cm,  Gr 2, 0/3 LN:  pT2 pN0 cM0;  ER + at 100%, MI + at 60%, HER 2 negative at [facility] ratio 1.6, sig/cell 3.75, oncotype Dx = 15; s/p lumpectomy, XRT (Dr Yousif Wiggins), anastrozole for 5 years (Dr. Colleen Stuart)  · 8/23/21 right breast mass core bx:  ILC, gr 2, 9 mm, ER + at 95%, MI negative, HER 2 negative at IHC 0, ki67 15%, LCIS focal, no LVI  · 10/12/21 right breast lumpectomy: ILC, 1.4 cm, gr 2,  0/3 LN, no LVI, pT1c pN0 cM0  · mammaprint shows high risk luminal B      History of Present Illness: An abnormal mammogram led to the pathology above    FH:   Mother with breast cancer at age 76 and 80; no ovarian, prostate, pancreas cancer    Past Medical History:   Diagnosis Date    ADD (attention deficit disorder) 8/17/2009    Breast cancer ([facility] [address] 2012    Left Breast    Depressive disorder, not elsewhere classified 8/17/2009    Essential hypertension, benign 8/17/2009    History of mammogram 02/17/2020    BI-RADS Category 3    Hypertension     Pap smear for cervical cancer screening 07/28/2016; 01/09/2020    negative, HPV negative; negative, HPV negative    Psychiatric disorder 2007    panic attacks/PTSD    PTSD (post-traumatic stress disorder)     Thyroid disease     Type 2 diabetes mellitus without complication ([facility] [address] 5/12/3555    Unspecified hypothyroidism 8/17/2009      Past Surgical History:   Procedure Laterality Date    HX BREAST LUMPECTOMY Left 2012    HX BREAST LUMPECTOMY Right 10/12/2021    RIGHT BREAST LUMPECTOMY WITH ULTRASOUND, RIGHT BREAST SENTINEL NODE BIOPSY performed by Ruma Farfan MD at [facility]    HX GYN      c section    HX HEENT  1992    partial thyroidectomy    HX TONSILLECTOMY      HX UROLOGICAL  1992    Bladder suspension    MA BREAST SURGERY PROCEDURE UNLISTED  January 2012    left breast biopsy      Social History     Tobacco Use    Smoking status: Never Smoker    Smokeless tobacco: Never Used   Substance Use Topics    Alcohol use: No     Alcohol/week: 0.0 standard drinks      Family History   Problem Relation Age of Onset    Diabetes Mother     Cancer Mother         breast    Stroke Mother     Breast Cancer Mother 57        80 second time    Hypertension Father     Elevated Lipids Father     Migraines Father     Dementia Father      Current Outpatient Medications   Medication Sig    exemestane (AROMASIN) 25 mg tablet Take 1 Tablet by mouth daily for 90 days.  meloxicam (MOBIC) 15 mg tablet TAKE 1 TABLET BY MOUTH EVERY DAY    lamoTRIgine (LaMICtal) 200 mg tablet Take 1 Tablet by mouth daily.  traZODone (DESYREL) 100 mg tablet Take 200 mg by mouth nightly.  naproxen sodium (Aleve) 220 mg tablet Take 220 mg by mouth three (3) times daily as needed.  pantoprazole (PROTONIX) 40 mg tablet TAKE 1 TABLET BY MOUTH EVERY DAY    sertraline (ZOLOFT) 50 mg tablet Take 50 mg by mouth daily.  losartan (COZAAR) 100 mg tablet TAKE 1 TABLET BY MOUTH EVERY DAY    metFORMIN ER (GLUCOPHAGE XR) 500 mg tablet TAKE TWO TABLETS BY MOUTH EVERY MORNING AND 1 TABLET AT NIGHT    levothyroxine (SYNTHROID) 137 mcg tablet TAKE 1 TABLET BY MOUTH EVERY DAY    diclofenac (VOLTAREN) 1 % gel Apply  to affected area four (4) times daily.  Learnerator Yaquelin 14 Day Sensor kit APPLY 1 SENSOR EVERY 14 DAYS TO CHECK BLOOD SUGARS DX:E11.9    albuterol (PROVENTIL HFA, VENTOLIN HFA, PROAIR HFA) 90 mcg/actuation inhaler Take 2 Puffs by inhalation every four (4) hours as needed for Wheezing.     flash glucose scanning reader (PBworks[employer]MediaSite YAQUELIN 14 DAY READER) misc Use to check blood sugars throughout the day    DAILY MULTI-VITAMIN PO Take 1 Tab by mouth daily.  ALPRAZolam (XANAX) 0.5 mg tablet Take 0.5 mg by mouth three (3) times daily as needed for Anxiety. (Patient not taking: Reported on 10/27/2021)    hydrOXYzine HCL (ATARAX) 50 mg tablet Take 1 Tablet by mouth every six (6) hours as needed for Anxiety. (Patient not taking: Reported on 10/27/2021)    guanFACINE ER (INTUNIV) 4 mg Tb24 ER tablet Take 4 mg by mouth daily. (Patient not taking: Reported on 10/27/2021)     No current facility-administered medications for this visit. No Known Allergies     Review of Systems: A complete review of systems was obtained, negative except as described above. Physical Exam:     Visit Vitals  /70   Pulse (!) 104   Temp 98.7 °F (37.1 °C) (Temporal)   Resp 18   Ht 5' 4\" (1.626 m)   Wt 157 lb (71.2 kg)   SpO2 92%   BMI 26.95 kg/m²     ECOG PS: 0    Constitutional: Appears well-developed and well-nourished in no apparent distress      Mental status: Alert and awake, Oriented to person/place/time, Able to follow commands    Eyes: EOM normal, Sclera normal, No visible ocular discharge    HENT: Normocephalic, atraumatic    Mouth/Throat: Moist mucous membranes    External Ears normal    Neck: No visualized mass    Pulmonary/Chest: Respiratory effort normal, No visualized signs of difficulty breathing or respiratory distress    Musculoskeletal: Normal gait with no signs of ataxia, Normal range of motion of neck    Neurological: No facial asymmetry (Cranial nerve 7 motor function), No gaze palsy    Skin: No significant exanthematous lesions or discoloration noted on facial skin    Psychiatric: Normal affect, No hallucinations       Results:     Lab Results   Component Value Date/Time    WBC 9.5 06/07/2017 03:23 PM    HGB 14.1 06/07/2017 03:23 PM    HCT 41.1 06/07/2017 03:23 PM    PLATELET 118 59/57/9489 03:23 PM    MCV 81 06/07/2017 03:23 PM    ABS.  NEUTROPHILS 6.4 06/07/2017 03:23 PM     Lab Results   Component Value Date/Time    Sodium 138 10/04/2021 02:52 PM    Potassium 4.7 10/04/2021 02:52 PM    Chloride 105 10/04/2021 02:52 PM    CO2 25 10/04/2021 02:52 PM    Glucose 124 (H) 10/04/2021 02:52 PM    BUN 26 (H) 10/04/2021 02:52 PM    Creatinine 0.76 10/04/2021 02:52 PM    GFR est AA >60 10/04/2021 02:52 PM    GFR est non-AA >60 10/04/2021 02:52 PM    Calcium 9.4 10/04/2021 02:52 PM    Glucose (POC) 277 (H) 10/12/2021 09:47 PM    Creatinine (POC) 1.00 08/31/2021 02:19 PM     Lab Results   Component Value Date/Time    Bilirubin, total 0.3 02/09/2021 01:36 PM    ALT (SGPT) 27 02/09/2021 01:36 PM    Alk. phosphatase 107 02/09/2021 01:36 PM    Protein, total 7.7 02/09/2021 01:36 PM    Albumin 4.1 02/09/2021 01:36 PM    Globulin 3.6 02/09/2021 01:36 PM     8/31/21 MRI breast  FINDINGS:     Background parenchymal enhancement: Moderate.      Lymph nodes: No lymphadenopathy.     Right breast: Irregular enhancing mass with left toe and washout kinetics in the  middle third of the right breast in the 7:00 position contains a biopsy clip and  measures 3.4 x 1.1 x 1.9 cm. No extension to skin. No other suspicious  morphology or enhancement in the right breast. Scattered background foci are  increased. Skin and nipple are within normal limits.     Left breast: There is no suspicious focus of morphology or temporal enhancement. Lumpectomy scar is posterior in the upper outer quadrant. Nipple angulation is  expected postsurgery. No unexpected skin thickening.     Miscellaneous: Hepatic cysts are partially imaged and grossly unchanged. This  does not represent a full evaluation of the liver.     IMPRESSION     1. 3.4 cm biopsy-proven carcinoma in the right breast. No evidence of  multicentric disease. 2. No lymphadenopathy. 3. No MRI evidence of malignancy in the posttreatment left breast.    Records reviewed and summarized above. Pathology report(s) reviewed above.   Radiology report(s) reviewed above. Assessment/plan:   1. Right breast ILC, 1.4 cm, gr 2,  0/3 LN, ER +, MI negative, HER 2 negative, stage IA both anatomic and prognostic  High risk mammaprint     History of L breast cancer, ER +, MI +, HER 2 negative, stage IIA, anatomic    We explained to the patient that the goal of systemic adjuvant therapy is to improve the chances for cure and decrease the risk of relapse. We explained why a patient can have microscopic cancer spread now even though physical examination, laboratory studies and imaging studies are negative for cancer. We explained that the same treatments used now as adjuvant or preventive treatments rarely if ever are curative in women who develop metastases. Using eprognosis. org, her 5 year all cause mortality risk is 9-15%; her 10 year all cause mortality risk is 34-43%; her median OS is 12-14 years . An adjuvant discussion is warranted. We discussed the potential value of Mammaprint testing. The [place] 70-gene prognostic profile (Mammaprint®), one of the first gene expression array-based prognosticators, classifies tumors as low-risk or high-risk for breast cancer recurrence. The clinical validity of the 70-gene prognostic profile has been demonstrated in multiple studies. Results from an international randomized trial, the Microarray in Node-Negative Disease May Avoid Chemotherapy (MINDACT) trial suggest that this genetic profile may identify subsets of patients who have a low likelihood of distant recurrence despite high-risk clinical features. In this trial, 4659 women, approximately [de-identified] percent of whom had lymph node-negative disease, underwent risk assessment by clinical criteria (using Adjuvant! Online) and by the 70-genetic profile. Patients with discordant clinical and genomic predictions were randomly assigned to receive or not receive adjuvant chemotherapy.  Among patients in the intention-to-treat population who had a high clinical risk of recurrence but a low risk by [place] genetic profile, the five-year distant metastases-free survival rate was 95.9 percent with chemotherapy and 94.4 percent without chemotherapy (HR for distant metastasis or death 0.78, 95% CI 0.50-1.21). However, it should be noted that the MINDACT study was not powered to exclude a benefit of chemotherapy. In analysis of discordant groups in the intention-to-treat population, adjuvant chemotherapy was associated with improvement in the rate of distant metastasis-free survival of 2.8 and 2 percent, respectively, for the high clinical/low genomic and low clinical/high genomic risk groups, although these differences were also not statistically significant. Based on MINDACT, ASCO has suggested Darryn Henderson is one of several assays that might be used to determine prognosis for those with high clinical risk, hormone receptor-positive, HER2-negative breast cancer and no or limited (one to three) involved lymph nodes to inform decisions regarding withholding chemotherapy. We discussed chemotherapy -- she has severe neuropathy and is the sole caretaker of her  with Parkinson's disease. We discussed the toxicities of TC chemotherapy (docetaxel 75mg/m2/cyclophosphamide 600 mg/m2 q 3 weeks x 4)  in detail. This chemotherapy frequently causes a low white blood cell count and a small percent of patients require hospital admission for treatment of neutropenic fever. We explained that on occasion we consider the use of growth factors to minimize this risk. We explained to the patient that some side effects, if they occur, only last a few days including nausea, vomiting, stomatitis, arthralgia, myalgia, and allergic reactions to Taxotere.  We told the patient that severe nausea and vomiting were very uncommon and that some side effects, if they occur, will last longer: this includes hair loss, which will be seen in all patients treated with these agents and fatigue, which will be seen in most. The patient was also told that if she is having menstrual function that she could develop chemotherapy-induced amenorrhea and infertility. We also told the patient that there was a very small risk of acute leukemia. We also informed the patient that heart damage is rare with these agents    Also discussed CMF IV q 3 weeks x 6 to avoid neuropathy. After this discussion, she declines chemotherapy. She will proceed with XRT with Dr. Gutierrez    The risks and benefits of aromatase inhibitors (anastrozole, letrozole, and exemestane) were discussed in detail and the patient was informed of the following: Risks include the development of painful muscles and joints (arthralgia/myalgia) and bone loss. Muscle and joint pain can be severe but rarely result in any tissue damage; symptoms usually resolve in several weeks when the medication is stopped. Bone loss is common and a bone density test is recommended as a baseline and then yearly to every several years depending on initial results. The risk of fractures is increased by a few percent in patients taking these drugs, but careful monitoring of bone density and using bone protecting agents when indicated can minimize these risks. Unlike tamoxifen there is no increased risk of blood clots or endometrial cancer. AIs can cause or worsen vaginal dryness but women using these drugs should not use vaginal estrogen preparations for these symptoms. AIs can also cause or increase hot flashes. Any other symptoms should be reported. Since she was on anastrozole previously, discussed exemestane 25 mg daily, rx in. She will start this a week after she completes XRT. rx in. Follow-up after early breast cancer was discussed. I recommend follow-up as defined by the American Society of Clinical Oncology and [facility].  This includes a visit to a health care professional every 3-6 months for 3 years, then every 6-12 months for 2 years, and then yearly as well as mammograms yearly. 2. Emotional well being:  She has excellent support and is coping well with her disease    3. Genetic testing:   discussed potential ramifications of a positive test including the potential need for bilateral mastectomies and bilateral oophorectomies and the risk then for her family members to also have a mutation. VUS also discussed. Will send at home testing. I could not find prior testing, but even if it has been done, it was done prior to 2014 and should be repeated    > 60 min were spent in this encounter with face to face consultation, record review, record finding and review from 2012, and documentation      I appreciate the opportunity to participate in Ms. Torey Sykes's care. Signed By: Rohith Francis MD      No orders of the defined types were placed in this encounter.

## 2021-10-27 NOTE — PROGRESS NOTES
Contacted the patient for a follow up, [address]  Incoming call received from patient    Care Transitions Follow Up Call    Care Transition Nurse (CTN) contacted the patient by telephone to follow up after admission on 10/12. Reports medications were adjusted. No longer on Hydroxyzine. Now on Klonopin. Taking trip with her girlfriends this weekend to [place]. Addressed changes since last contact: medications- started on Klonipin  Follow up appointment completed? no.   Was follow up appointment scheduled within 7 days of discharge? no.     Advance Care Planning:   Does patient have an Advance Directive:  not on file    CTN reviewed medical action plan and red flags with patient and discussed any barriers to care and/or understanding of plan of care after discharge. Discussed appropriate site of care based on symptoms and resources available to patient including: Specialist. The patient agrees to contact the PCP office for questions related to their healthcare. Patients top risk factors for readmission: medical condition-breast cancer   Interventions to address risk factors: Scheduled appointment with Specialist-10/27    [facility] follow up appointment(s):   Future Appointments   Date Time Provider Jeanna Natarajan   10/27/2021  4:45 PM Li Diallo [facility]   14/4/0205  4:20 AM [facility]   11/29/2021 10:00 AM Yasemin Cates  S [facility] follow up appointment(s): NA    CTN provided contact information for future needs. Plan for follow-up call in 10-14 days based on severity of symptoms and risk factors.   Plan for next call: self management-r breast     Goals Addressed                 This Visit's Progress     Attend follow up appointments on schedule   On track       10/18/21   Will attend follow up appt with Surgeon scheduled 11/1   Will attend new patient appt with mental counselor scheduled 10/25    10/27/21   Attend appt with counselor at [address] [place] [address] Has follow up with Dr. Ben Gilmore this afternoon       Prevent complications post hospitalization. On track       10/18/21   Patient will monitor for s/sx of infection and will report to surgeon   Pt will remain out of the hospital or ER for remainder of AMANDA period.      10/27/21   denies evidence of infection noted to R breast   Reports scars look \"good\"   Will manage to reduce anxiety with medication assistance

## 2021-10-28 ENCOUNTER — TELEPHONE (OUTPATIENT)
Dept: ONCOLOGY | Age: 66
End: 2021-10-28

## 2021-10-28 NOTE — TELEPHONE ENCOUNTER
Called patient to schedule 3 month follow up and she stated she will call back to schedule this appointment.

## 2021-11-05 ENCOUNTER — TELEPHONE (OUTPATIENT)
Dept: ONCOLOGY | Age: 66
End: 2021-11-05

## 2021-11-05 ENCOUNTER — OFFICE VISIT (OUTPATIENT)
Dept: SURGERY | Age: 66
End: 2021-11-05
Payer: MEDICARE

## 2021-11-05 VITALS — WEIGHT: 157 LBS | BODY MASS INDEX: 26.8 KG/M2 | HEIGHT: 64 IN

## 2021-11-05 DIAGNOSIS — Z98.890 S/P LUMPECTOMY OF BREAST: ICD-10-CM

## 2021-11-05 DIAGNOSIS — C50.911 INVASIVE LOBULAR CARCINOMA OF RIGHT BREAST IN FEMALE (HCC): Primary | ICD-10-CM

## 2021-11-05 PROCEDURE — 99024 POSTOP FOLLOW-UP VISIT: CPT | Performed by: NURSE PRACTITIONER

## 2021-11-05 NOTE — PROGRESS NOTES
HISTORY OF PRESENT ILLNESS  Jimi Young is a 78 y.o. female. HPI Established patient presents for post-op follow-up s/p RIGHT lumpectomy and SLNB. No concerns and no pain today. Breast history -   Referring -   · 1/25/2012 left breast lumpectomy:  [facility] with mixed ductal and lobular features, 2.3 cm,  Gr 2, 0/3 LN:  pT2 pN0 cM0;  ER + at 100%, AZ + at 60%, HER 2 negative at [facility] ratio 1.6, sig/cell 3.75, oncotype Dx = 15; s/p lumpectomy, XRT (Dr Judie Kenyon), anastrozole for 5 years (Dr. Alexsander Martin)  · 8/23/21 right breast core bx:  ILC, gr 2, 9 mm, ER + at 95%, AZ negative, HER 2 negative at IHC 0, ki67 15%, LCIS focal, no LVI  · 10/12/21 right breast lumpectomy: ILC, 1.4 cm, gr 2,  0/3 LN, no LVI - Dr. Byron Villalba  · EQ2H pN0 cM0  · Mammaprint shows high risk luminal B  · Referral to Dr. Bel Bob  · Saw Dr. Susan Colunga - declined chemo, will start on AI after XRT      Family history -   Mother with breast cancer at age 76 and 80; no ovarian, prostate, pancreas cancer  Genetic testing - Dr. Susan SOLIS    Physical Exam  Chest:             Visit Vitals  Ht 5' 4\" (1.626 m)   Wt 157 lb (71.2 kg)   BMI 26.95 kg/m²         ASSESSMENT and PLAN  Encounter Diagnoses   Name Primary?  Invasive lobular carcinoma of right breast in female [facility]) Yes    S/P lumpectomy of breast        RIGHT breast and axilla - incisions well healed. Has seen Dr. Susan Colunga - declines chemo, plans for AI after XRT. Has appt with Dr. Bel Bob. Follow-up here in 6 months or sooner PRN. She is comfortable with this plan. All questions answered and she stated understanding.

## 2021-11-05 NOTE — TELEPHONE ENCOUNTER
11/5/21 9:13 AM: Called patient to confirm ancestry for [facility] genetic testing order. Patient stated that she does not have Ashkenazi Amish ancestry that she is aware of. Explained [facility] genetic testing process to patient and advised that they will send a saliva kit to her home in the next few days. Patient verbalized understanding and did not have any questions at this time.

## 2021-11-10 ENCOUNTER — NURSE NAVIGATOR (OUTPATIENT)
Dept: CASE MANAGEMENT | Age: 66
End: 2021-11-10

## 2021-11-10 NOTE — NURSE NAVIGATOR
[address]  Breast Navigator Encounter    Name: Denise Cabrera  Age: 78 y.o.  : 1955  Diagnosis: Right breast ILC; ER+/WI-/HER2-; 0/3 LN; High risk Mammaprint    Encounter type:  []Patient Initiated  [x]Navigator Follow-up []Pre-op  []Post-op  []Check-in Prior to First Treatment []Treatment Modality Change  []Survivorship Transition []Other:     Narrative:   During chart review it was noted pt was unable to make XRT consult on . Called pt to check in and assess for barriers to care. Ms. Yomi Patterson states she had to cancel the appointment d/t a family emergency. She states she has it on her to-do list to call and reschedule. Gave her the phone number and she states she will call tomorrow. Ms. Jagjit Genao only other concern is persistent itching within her breast. Dr. Mathieu Velasquez and team at DR [facility] aware. Pt states it is improving and is tolerable at this time. No other questions or concerns.           Dieudonne Patel, GRETCHENN, RN, [facility]  Oncology Breast Navigator    [address]  [address] [place] [address]  W: [phone]  F: [phone]  [email or web address]   Good Help to Those in [facility]

## 2021-11-29 ENCOUNTER — OFFICE VISIT (OUTPATIENT)
Dept: NEUROLOGY | Age: 66
End: 2021-11-29
Payer: MEDICARE

## 2021-11-29 ENCOUNTER — TELEPHONE (OUTPATIENT)
Dept: NEUROLOGY | Age: 66
End: 2021-11-29

## 2021-11-29 DIAGNOSIS — R20.2 PARESTHESIA: Primary | ICD-10-CM

## 2021-11-29 PROCEDURE — 11105 PUNCH BX SKIN EA SEP/ADDL: CPT | Performed by: PSYCHIATRY & NEUROLOGY

## 2021-11-29 PROCEDURE — 11104 PUNCH BX SKIN SINGLE LESION: CPT | Performed by: PSYCHIATRY & NEUROLOGY

## 2021-11-29 NOTE — PROCEDURES
[facility] NEUROLOGY CLINIC [place]    OFFICE PROCEDURE NOTE    PROCEDURE: Skin Biopsy  Date of Procedure: 11/29/2021    CPT Code:  11104 x 1  11105 x 2    ICD-10 Code:     ICD-10-CM ICD-9-CM    1. Paresthesia  R20.2 782.0         Time Out performed immediately prior to the start of procedure:  Zo TELLEZ MD, have performed the following review on [address] [place] [address] prior to the start of the procedure: Skin Biopsy. The patient was identified by name and date of birth. Agreement on the procedure to be performed was verified. The potential Benefits and potential Risks were explained to the patient. The procedure site(s) were verified and marked as necessary. Consent was signed and verified. The patient was positioned for comfort. Time: 1020. Date of Procedure: 11/29/2021. Procedure performed by: Zo Jay MD.  Provider assisted by: none. Patient assisted by: self. How patient tolerated procedure: mild procedure-related pain, no complications. Comments: none. TECHNICAL:      The procedure and potential complications were explained to the patient, and verbal consent was obtained. The skin was cleansed with the betadine swabs over the right and left EDB, 10 cm proximal to the right lateral malleolus. The cleaned areas were infiltrated with 1% lidocaine with epinephrine subcutaneous. A skin punch biopsy was performed at each site with the provided biopsy tool. Each sample was placed into the provided tubes/ containers (each labeled using the provided labels), lids tightened, placed back into the styrofoam fernandes and then placed on ice in the supplied styrofoam box and shipped back to [address] [place] [address] via overnight delivery/ [employer]. The biopsy sites were cleaned with alcohol swabs, then bandages applied. The patient was given post-biopsy instructions regarding aftercare. There were no immediate or obvious complications and the patient did not complain of pain afterwards.       Signed By: Gavino Zarco MD     November 29, 2021

## 2021-12-01 ENCOUNTER — NURSE NAVIGATOR (OUTPATIENT)
Dept: CASE MANAGEMENT | Age: 66
End: 2021-12-01

## 2021-12-01 NOTE — NURSE NAVIGATOR
[address]  Breast Navigator Encounter    Name: Kelsey Alas  Age: 78 y.o.  : 1955  Diagnosis: Right breast ILC; ER+/WI-/HER2-; 0/3 LN; High risk Mammaprint    Encounter type:  []Patient Initiated  [x]Navigator Follow-up []Pre-op  []Post-op  []Check-in Prior to First Treatment []Treatment Modality Change  []Survivorship Transition []Other:     Narrative:   During chart review it is noted that pt has not yet rescheduled her XRT consult. Called pt to assess for barriers to care, however no answer at this time. Left VM.           GRETCHEN HamptonN, RN, [facility]  Oncology Breast Navigator    [address]  [address] [place] [address]  W: [phone]  F: [phone]  [email or web address]   Good Help to Those in [facility]

## 2021-12-03 ENCOUNTER — NURSE NAVIGATOR (OUTPATIENT)
Dept: CASE MANAGEMENT | Age: 66
End: 2021-12-03

## 2021-12-03 NOTE — NURSE NAVIGATOR
[employer]  Breast Navigator Encounter    Name: Jaky Guallpa  Age: 78 y.o.  : 1955  Diagnosis: Right breast ILC; ER+/AR-/HER2-; 0/3 LN; High risk Mammaprint    Encounter type:  []Patient Initiated  [x]Navigator Follow-up []Pre-op  []Post-op  []Check-in Prior to First Treatment []Treatment Modality Change  []Survivorship Transition []Other:     Narrative:   Pt returned call at this time. She states she has been putting off rescheduling her XRT consult d/t persistent breast itchiness and other health concerns. Encouraged her to go ahead and schedule because treatment start wouldn't be right away giving her breast more time to heal. She verbalizes understanding. Gave her the # to call and reschedule. She states she will call on Monday.         Lawyer Lefort, BSN, RN, [facility]  Oncology Breast Navigator    [employer]  [address] [place] [address]  W: [phone]  F: [phone]  [email or web address]   Good Help to Those in [facility]

## 2021-12-16 ENCOUNTER — PATIENT MESSAGE (OUTPATIENT)
Dept: NEUROLOGY | Age: 66
End: 2021-12-16

## 2021-12-16 DIAGNOSIS — E11.42 DIABETIC POLYNEUROPATHY ASSOCIATED WITH TYPE 2 DIABETES MELLITUS (HCC): Primary | ICD-10-CM

## 2021-12-28 ENCOUNTER — PATIENT MESSAGE (OUTPATIENT)
Dept: NEUROLOGY | Age: 66
End: 2021-12-28

## 2021-12-28 ENCOUNTER — HOSPITAL ENCOUNTER (OUTPATIENT)
Dept: RADIATION THERAPY | Age: 66
Discharge: HOME | End: 2021-12-28

## 2021-12-28 VITALS — HEIGHT: 64 IN | BODY MASS INDEX: 27.49 KG/M2 | WEIGHT: 161 LBS

## 2021-12-28 RX ORDER — DULOXETIN HYDROCHLORIDE 30 MG/1
30 CAPSULE, DELAYED RELEASE ORAL DAILY
Qty: 30 CAPSULE | Refills: 0 | Status: SHIPPED | OUTPATIENT
Start: 2021-12-28 | End: 2022-01-05 | Stop reason: SDUPTHER

## 2021-12-28 NOTE — TELEPHONE ENCOUNTER
From: Gab King  To: Luis Garcia MD  Sent: 12/16/2021 9:57 AM EST  Subject: Neuropathy     Is there any med you can prescribe to relieve the discomfort in my feet? They are very bothersome.

## 2022-01-05 ENCOUNTER — PATIENT MESSAGE (OUTPATIENT)
Dept: INTERNAL MEDICINE CLINIC | Age: 67
End: 2022-01-05

## 2022-01-05 ENCOUNTER — TELEPHONE (OUTPATIENT)
Dept: NEUROLOGY | Age: 67
End: 2022-01-05

## 2022-01-05 DIAGNOSIS — E11.42 DIABETIC POLYNEUROPATHY ASSOCIATED WITH TYPE 2 DIABETES MELLITUS (HCC): ICD-10-CM

## 2022-01-05 DIAGNOSIS — E11.9 TYPE 2 DIABETES MELLITUS WITHOUT COMPLICATION, WITHOUT LONG-TERM CURRENT USE OF INSULIN (HCC): ICD-10-CM

## 2022-01-05 RX ORDER — DULOXETIN HYDROCHLORIDE 60 MG/1
60 CAPSULE, DELAYED RELEASE ORAL DAILY
Qty: 30 CAPSULE | Refills: 1 | Status: SHIPPED | OUTPATIENT
Start: 2022-01-05 | End: 2022-01-24 | Stop reason: SDUPTHER

## 2022-01-05 RX ORDER — METFORMIN HYDROCHLORIDE 500 MG/1
TABLET, EXTENDED RELEASE ORAL
Qty: 270 TABLET | Refills: 1 | Status: SHIPPED | OUTPATIENT
Start: 2022-01-05 | End: 2022-07-15

## 2022-01-05 NOTE — TELEPHONE ENCOUNTER
Patient just cancelled her F/U due to illness. Dr. Adriana Travis had put the patient on Cymbalta for her neuropathy. Patient states that this dosage is not working well for her. Patient asks for a call back.      Thank you

## 2022-01-19 ENCOUNTER — PATIENT MESSAGE (OUTPATIENT)
Dept: NEUROLOGY | Age: 67
End: 2022-01-19

## 2022-01-19 DIAGNOSIS — E11.42 DIABETIC POLYNEUROPATHY ASSOCIATED WITH TYPE 2 DIABETES MELLITUS (HCC): ICD-10-CM

## 2022-01-24 ENCOUNTER — TELEPHONE (OUTPATIENT)
Dept: NEUROLOGY | Age: 67
End: 2022-01-24

## 2022-01-24 DIAGNOSIS — E11.42 DIABETIC POLYNEUROPATHY ASSOCIATED WITH TYPE 2 DIABETES MELLITUS (HCC): ICD-10-CM

## 2022-01-25 RX ORDER — DULOXETIN HYDROCHLORIDE 60 MG/1
60 CAPSULE, DELAYED RELEASE ORAL DAILY
Qty: 90 CAPSULE | Refills: 0 | Status: SHIPPED | OUTPATIENT
Start: 2022-01-25 | End: 2022-02-10 | Stop reason: SDUPTHER

## 2022-02-10 RX ORDER — DULOXETIN HYDROCHLORIDE 60 MG/1
60 CAPSULE, DELAYED RELEASE ORAL DAILY
Qty: 90 CAPSULE | Refills: 0 | Status: SHIPPED | OUTPATIENT
Start: 2022-02-10 | End: 2022-05-06 | Stop reason: SDUPTHER

## 2022-02-10 RX ORDER — HYDROCORTISONE 25 MG/G
CREAM TOPICAL 2 TIMES DAILY
Qty: 30 G | Refills: 2 | Status: SHIPPED | OUTPATIENT
Start: 2022-02-10 | End: 2022-03-01 | Stop reason: ALTCHOICE

## 2022-02-10 NOTE — TELEPHONE ENCOUNTER
Adrienne Brandt LPN 9/3/8851 16:37 AM EST      ----- Message -----  From: Ramirez River  Sent: 2/8/2022 6:11 PM EST  To: [facility] Nurse Pool  Subject: Neuropathy     The medicine, Duloxetine HCL, DR 60 mg cap is too expensive at [facility]. Can you resend to the [facility] Dr. Landaverde as they are much less expensive? I have 5 remaining at this time.  Thank you

## 2022-02-25 DIAGNOSIS — E03.9 ACQUIRED HYPOTHYROIDISM: ICD-10-CM

## 2022-02-25 RX ORDER — LEVOTHYROXINE SODIUM 137 UG/1
TABLET ORAL
Qty: 90 TABLET | Refills: 2 | Status: SHIPPED | OUTPATIENT
Start: 2022-02-25

## 2022-03-01 RX ORDER — HYDROCORTISONE 25 MG/G
CREAM TOPICAL 2 TIMES DAILY
Qty: 30 G | Refills: 2 | Status: SHIPPED | OUTPATIENT
Start: 2022-03-01 | End: 2022-03-18 | Stop reason: SDUPTHER

## 2022-03-18 DIAGNOSIS — Z85.3 HISTORY OF LEFT BREAST CANCER: Primary | ICD-10-CM

## 2022-03-18 PROBLEM — C50.911 INVASIVE LOBULAR CARCINOMA OF RIGHT BREAST IN FEMALE (HCC): Status: ACTIVE | Noted: 2021-08-23

## 2022-03-18 RX ORDER — HYDROCORTISONE 25 MG/G
CREAM TOPICAL 2 TIMES DAILY
Qty: 30 G | Refills: 2 | Status: SHIPPED | OUTPATIENT
Start: 2022-03-18 | End: 2022-08-22

## 2022-03-19 PROBLEM — E11.21 TYPE 2 DIABETES MELLITUS WITH DIABETIC NEPHROPATHY, WITHOUT LONG-TERM CURRENT USE OF INSULIN (HCC): Status: ACTIVE | Noted: 2018-04-16

## 2022-03-19 PROBLEM — C50.919 BREAST CANCER (HCC): Status: ACTIVE | Noted: 2021-10-12

## 2022-03-19 PROBLEM — Z85.3 HISTORY OF LEFT BREAST CANCER: Status: ACTIVE | Noted: 2021-08-23

## 2022-03-21 ENCOUNTER — OFFICE VISIT (OUTPATIENT)
Dept: NEUROLOGY | Age: 67
End: 2022-03-21
Payer: MEDICARE

## 2022-03-21 VITALS
HEART RATE: 73 BPM | WEIGHT: 161 LBS | SYSTOLIC BLOOD PRESSURE: 133 MMHG | DIASTOLIC BLOOD PRESSURE: 84 MMHG | OXYGEN SATURATION: 97 % | BODY MASS INDEX: 27.64 KG/M2 | RESPIRATION RATE: 14 BRPM | TEMPERATURE: 96 F

## 2022-03-21 DIAGNOSIS — G62.9 SMALL FIBER NEUROPATHY: Primary | ICD-10-CM

## 2022-03-21 PROCEDURE — 1101F PT FALLS ASSESS-DOCD LE1/YR: CPT | Performed by: PSYCHIATRY & NEUROLOGY

## 2022-03-21 PROCEDURE — G8400 PT W/DXA NO RESULTS DOC: HCPCS | Performed by: PSYCHIATRY & NEUROLOGY

## 2022-03-21 PROCEDURE — 1090F PRES/ABSN URINE INCON ASSESS: CPT | Performed by: PSYCHIATRY & NEUROLOGY

## 2022-03-21 PROCEDURE — G8754 DIAS BP LESS 90: HCPCS | Performed by: PSYCHIATRY & NEUROLOGY

## 2022-03-21 PROCEDURE — G8432 DEP SCR NOT DOC, RNG: HCPCS | Performed by: PSYCHIATRY & NEUROLOGY

## 2022-03-21 PROCEDURE — G8427 DOCREV CUR MEDS BY ELIG CLIN: HCPCS | Performed by: PSYCHIATRY & NEUROLOGY

## 2022-03-21 PROCEDURE — G8752 SYS BP LESS 140: HCPCS | Performed by: PSYCHIATRY & NEUROLOGY

## 2022-03-21 PROCEDURE — 3017F COLORECTAL CA SCREEN DOC REV: CPT | Performed by: PSYCHIATRY & NEUROLOGY

## 2022-03-21 PROCEDURE — G8536 NO DOC ELDER MAL SCRN: HCPCS | Performed by: PSYCHIATRY & NEUROLOGY

## 2022-03-21 PROCEDURE — 99214 OFFICE O/P EST MOD 30 MIN: CPT | Performed by: PSYCHIATRY & NEUROLOGY

## 2022-03-21 PROCEDURE — G8419 CALC BMI OUT NRM PARAM NOF/U: HCPCS | Performed by: PSYCHIATRY & NEUROLOGY

## 2022-03-21 PROCEDURE — G9899 SCRN MAM PERF RSLTS DOC: HCPCS | Performed by: PSYCHIATRY & NEUROLOGY

## 2022-03-21 RX ORDER — PREGABALIN 75 MG/1
75 CAPSULE ORAL 2 TIMES DAILY
Qty: 60 CAPSULE | Refills: 3 | Status: SHIPPED | OUTPATIENT
Start: 2022-03-21 | End: 2022-04-07 | Stop reason: SDUPTHER

## 2022-03-21 NOTE — PROGRESS NOTES
[address] [facility] and [address] at [facility]ics at [address] [address] [address] [facility], [address] [place] [address]   [phone]              Chief Complaint   Patient presents with    Results     skin bx     Current Outpatient Medications   Medication Sig Dispense Refill    hydrocortisone (HYTONE) 2.5 % topical cream Apply  to affected area two (2) times a day. use thin layer 30 g 2    levothyroxine (SYNTHROID) 137 mcg tablet TAKE 1 TABLET BY MOUTH EVERY DAY 90 Tablet 2    DULoxetine (CYMBALTA) 60 mg capsule Take 1 Capsule by mouth daily for 90 days. For neuropathy pain. 90 Capsule 0    metFORMIN ER (GLUCOPHAGE XR) 500 mg tablet 2 in morning and 1 at night 270 Tablet 1    meloxicam (MOBIC) 15 mg tablet TAKE 1 TABLET BY MOUTH EVERY DAY 90 Tablet 1    lamoTRIgine (LaMICtal) 200 mg tablet Take 1 Tablet by mouth daily. 30 Tablet 0    traZODone (DESYREL) 100 mg tablet Take 200 mg by mouth nightly.  naproxen sodium (Aleve) 220 mg tablet Take 220 mg by mouth three (3) times daily as needed.  pantoprazole (PROTONIX) 40 mg tablet TAKE 1 TABLET BY MOUTH EVERY DAY 90 Tablet 1    losartan (COZAAR) 100 mg tablet TAKE 1 TABLET BY MOUTH EVERY DAY 90 Tablet 1    diclofenac (VOLTAREN) 1 % gel Apply  to affected area four (4) times daily. 50 g 0    FreeStyle Yaquelin 14 Day Sensor kit APPLY 1 SENSOR EVERY 14 DAYS TO CHECK BLOOD SUGARS DX:E11.9 2 Kit 3    albuterol (PROVENTIL HFA, VENTOLIN HFA, PROAIR HFA) 90 mcg/actuation inhaler Take 2 Puffs by inhalation every four (4) hours as needed for Wheezing. 1 Inhaler 1    flash glucose scanning reader (FREESTYLE YAQUELIN 14 DAY READER) [facility] Use to check blood sugars throughout the day 1 Each 0    DAILY MULTI-VITAMIN PO Take 1 Tab by mouth daily.         No Known Allergies  Social History     Tobacco Use    Smoking status: Never Smoker    Smokeless tobacco: Never Used   Substance Use Topics    Alcohol use: No     Alcohol/week: 0.0 standard drinks    Drug use: No     70-year-old lady returns today for follow-up. I saw her back in August 2021 for abnormal sensations/dysesthesias with normal EMG. We sent her for skin biopsy and ANS testing. The biopsy returned with reduced epidermal nerve fiber density of significant degree in the left foot, right calf and right foot. Also had decreased sweat gland nerve fiber density in the right foot. All this is consistent with small fiber neuropathy. She has been on Lyrica and Cymbalta was added. She is also on Lamictal.  Has been on Neurontin in the past.  She stopped the Lyrica because it was not helping. She has not use Lyrica and Cymbalta together. She notes that her discomfort is terrible. The only respite she gets is when she sleeps. Review finds oncology note dated 10/27/2021 where she was being evaluated for breast cancer. She underwent right breast lumpectomy. She had a history of left-sided breast cancer previously. Chemotherapy was declined however she proceeded with XRT. Examination  Visit Vitals  /84 (BP 1 Location: Left upper arm, BP Patient Position: Sitting, BP Cuff Size: Adult)   Pulse 73   Temp (!) 96 °F (35.6 °C)   Resp 14   Wt 73 kg (161 lb)   SpO2 97%   BMI 27.64 kg/m²   She is a very pleasant lady. She is awake alert and oriented. She has normal speech and language. Normal cognition. Steady      Impression/Plan  Small fiber neuropathy worse symptomatically  Discussed options  Continue Cymbalta  Add Lyrica back using it for synergy  She will let me know by the MyChart in about 4 weeks how her symptoms are doing and at that point we will make dose changes if necessary and make follow-up appointment    Oz Victor MD        This note was created using voice recognition software. Despite editing, there may be syntax errors.

## 2022-03-24 DIAGNOSIS — K21.00 GERD WITH ESOPHAGITIS: ICD-10-CM

## 2022-03-24 RX ORDER — PANTOPRAZOLE SODIUM 40 MG/1
TABLET, DELAYED RELEASE ORAL
Qty: 90 TABLET | Refills: 1 | Status: SHIPPED | OUTPATIENT
Start: 2022-03-24 | End: 2022-11-02

## 2022-04-04 ENCOUNTER — PATIENT MESSAGE (OUTPATIENT)
Dept: NEUROLOGY | Age: 67
End: 2022-04-04

## 2022-04-04 DIAGNOSIS — G62.9 SMALL FIBER NEUROPATHY: ICD-10-CM

## 2022-04-07 RX ORDER — PREGABALIN 75 MG/1
CAPSULE ORAL
Qty: 90 CAPSULE | Refills: 3 | Status: SHIPPED | OUTPATIENT
Start: 2022-04-07 | End: 2022-08-09 | Stop reason: SDUPTHER

## 2022-04-07 NOTE — TELEPHONE ENCOUNTER
Angie Cox MD 4/6/2022 9:35 AM EDT    Increase Lyrica to a dose of 1 in the morning and 2 at night   ----- Message -----  From: Dano Hoover RN  Sent: 4/5/2022 12:53 PM EDT  To: Mercedez Fink MD  Subject: FW: Neuropathy       ----- Message -----  From: Britney Serna LPN  Sent: 4/5/5343 2:21 AM EDT  To: Matthew Morales RN  Subject: FW: Neuropathy        ----- Message -----  From: Elyandel Villegas  Sent: 4/4/2022 8:02 PM EDT  To: [facility] Nurse Pool  Subject: Neuropathy     The combination of meds has been very helpful! I believe a little increase of one would be even better. Especially in the afternoon. Let me know what you think. Thanks.  Sara Lauren

## 2022-04-13 ENCOUNTER — TELEPHONE (OUTPATIENT)
Dept: ONCOLOGY | Age: 67
End: 2022-04-13

## 2022-04-13 ENCOUNTER — PATIENT MESSAGE (OUTPATIENT)
Dept: ONCOLOGY | Age: 67
End: 2022-04-13

## 2022-04-13 DIAGNOSIS — C50.911 INVASIVE LOBULAR CARCINOMA OF RIGHT BREAST IN FEMALE (HCC): ICD-10-CM

## 2022-04-13 DIAGNOSIS — Z78.0 POSTMENOPAUSAL: Primary | ICD-10-CM

## 2022-04-13 RX ORDER — EXEMESTANE 25 MG/1
25 TABLET ORAL DAILY
Qty: 90 TABLET | Refills: 3 | Status: SHIPPED | OUTPATIENT
Start: 2022-04-13 | End: 2023-04-08

## 2022-04-13 NOTE — TELEPHONE ENCOUNTER
Patient called and left a  requesting her estrogen pills be resent to the [place] [facility].  #[phone]

## 2022-04-14 ENCOUNTER — TELEPHONE (OUTPATIENT)
Dept: ONCOLOGY | Age: 67
End: 2022-04-14

## 2022-04-14 NOTE — TELEPHONE ENCOUNTER
Patient called and stated that she called her pharmacy to have her estrogen medication filled and they are stating they do not have a profile/ request for this medication.  Please advise       CB# [phone]

## 2022-04-15 NOTE — PROGRESS NOTES
Mason Schultz (: 1955) is a 78 y.o. female, established patient, here for evaluation of the following chief complaint(s):  Follow-up (labs, thyroid and A1c)       ASSESSMENT/PLAN:  Below is the assessment and plan developed based on review of pertinent history, physical exam, labs, studies, and medications. 1. Medicare annual wellness visit, subsequent  2. Type 2 diabetes mellitus with diabetic nephropathy, without long-term current use of insulin (HCC)  -     MICROALBUMIN, UR, RAND W/ MICROALB/CREAT RATIO; Future  -     HEMOGLOBIN A1C WITH EAG; Future  -     LIPID PANEL; Future  Will recheck BG, I am concerned as her sugar was elevated at the last visit and she is now no longer taking Trulicity. For now, continue with ongoing regimen of Metformin. 3. Invasive lobular carcinoma of right breast in female [facility])  Followed by Dr. Juvenal Whitehead, plans to begin [facility] soon. 4. Essential hypertension, benign  -     METABOLIC PANEL, COMPREHENSIVE; Future  BP is at goal. I do not recommend any change in medications (Losartan). 5. Acquired hypothyroidism  -     TSH 3RD GENERATION; Future  -     T4, FREE; Future  Thyroid presumed stable. Rechecking labs to confirm. Pt tolerating medication. I do not recommend a change in treatment plan (Synthroid). 6. Gastroesophageal reflux disease with esophagitis without hemorrhage  -     CBC W/O DIFF; Future  Stable and well-managed. Continue with ongoing regimen of Protonix. 7. Moderate episode of recurrent major depressive disorder ([facility] [address]  Not at goal due to personal circumstances, followed by psychiatry and undergoing counseling. Continue with ongoing regimen of Cymbalta. SUBJECTIVE/OBJECTIVE:  HPI     Breast cancer: Pt underwent a lumpectomy and radiation, all of which was overseen by Dr. Susanna Villanueva and Dr. Juvenal Whitehead. She notes residual tenderness and swelling, but otherwise is doing well. Pt plans to begin Aromison.      Depression: Pt currently followed by a psych NP at [facility] and undergoing counseling. She notes worsening of her mood since her daughter moved in with her. Diabetic Review of Systems - medication compliance: compliant all of the time. Other symptoms and concerns: Pt's last a1c was 6.5 on 2/9/21 with an estimated BG of 140. She endorses a healthy diet and has decreased snacking as well as carb consumption. Pt discontinued Trulicity due to adverse side effects. GERD: Pt states that her sx are well-managed. RLS: Pt no longer takes medication as her sx have resolved. Hypertension ROS: taking medications as instructed, no medication side effects noted, no TIA's, no chest pain on exertion, no dyspnea on exertion, no swelling of ankles. BP in office today is 130/80. Review of Systems   Musculoskeletal: Positive for arthralgias. Psychiatric/Behavioral: Positive for dysphoric mood. All other systems reviewed and are negative. Physical Exam  Constitutional:       Appearance: Normal appearance. HENT:      Right Ear: Tympanic membrane and external ear normal.      Left Ear: Tympanic membrane and external ear normal.      Mouth/Throat:      Mouth: Mucous membranes are moist.      Pharynx: Oropharynx is clear. Cardiovascular:      Rate and Rhythm: Normal rate and regular rhythm. Pulses: Normal pulses. Heart sounds: Normal heart sounds. Pulmonary:      Effort: Pulmonary effort is normal.      Breath sounds: Normal breath sounds. Musculoskeletal:         General: Normal range of motion. Skin:     General: Skin is warm and dry. Neurological:      General: No focal deficit present. Mental Status: She is alert and oriented to person, place, and time.    Psychiatric:         Mood and Affect: Mood normal.         Behavior: Behavior normal.       On this date 04/18/2022 I have spent 30 minutes reviewing previous notes, test results and face to face with the patient discussing the diagnosis and importance of compliance with the treatment plan as well as documenting on the day of the visit. An electronic signature was used to authenticate this note. Written by Shannan Lazar as dictated by Dr. Aydee Ortiz.    -- Shannan Lazar

## 2022-04-18 ENCOUNTER — OFFICE VISIT (OUTPATIENT)
Dept: INTERNAL MEDICINE CLINIC | Age: 67
End: 2022-04-18
Payer: MEDICARE

## 2022-04-18 VITALS
OXYGEN SATURATION: 95 % | WEIGHT: 165 LBS | HEIGHT: 64 IN | HEART RATE: 75 BPM | DIASTOLIC BLOOD PRESSURE: 80 MMHG | SYSTOLIC BLOOD PRESSURE: 130 MMHG | TEMPERATURE: 98.4 F | BODY MASS INDEX: 28.17 KG/M2 | RESPIRATION RATE: 16 BRPM

## 2022-04-18 DIAGNOSIS — K21.00 GASTROESOPHAGEAL REFLUX DISEASE WITH ESOPHAGITIS WITHOUT HEMORRHAGE: ICD-10-CM

## 2022-04-18 DIAGNOSIS — F33.1 MODERATE EPISODE OF RECURRENT MAJOR DEPRESSIVE DISORDER (HCC): ICD-10-CM

## 2022-04-18 DIAGNOSIS — Z00.00 MEDICARE ANNUAL WELLNESS VISIT, SUBSEQUENT: Primary | ICD-10-CM

## 2022-04-18 DIAGNOSIS — E11.21 TYPE 2 DIABETES MELLITUS WITH DIABETIC NEPHROPATHY, WITHOUT LONG-TERM CURRENT USE OF INSULIN (HCC): ICD-10-CM

## 2022-04-18 DIAGNOSIS — E03.9 ACQUIRED HYPOTHYROIDISM: ICD-10-CM

## 2022-04-18 DIAGNOSIS — C50.911 INVASIVE LOBULAR CARCINOMA OF RIGHT BREAST IN FEMALE (HCC): ICD-10-CM

## 2022-04-18 DIAGNOSIS — I10 ESSENTIAL HYPERTENSION, BENIGN: ICD-10-CM

## 2022-04-18 LAB
ALBUMIN SERPL-MCNC: 4.3 G/DL (ref 3.5–5)
ALBUMIN/GLOB SERPL: 1.3 {RATIO} (ref 1.1–2.2)
ALP SERPL-CCNC: 104 U/L (ref 45–117)
ALT SERPL-CCNC: 35 U/L (ref 12–78)
ANION GAP SERPL CALC-SCNC: 6 MMOL/L (ref 5–15)
AST SERPL-CCNC: 19 U/L (ref 15–37)
BILIRUB SERPL-MCNC: 0.3 MG/DL (ref 0.2–1)
BUN SERPL-MCNC: 17 MG/DL (ref 6–20)
BUN/CREAT SERPL: 21 (ref 12–20)
CALCIUM SERPL-MCNC: 9.4 MG/DL (ref 8.5–10.1)
CHLORIDE SERPL-SCNC: 104 MMOL/L (ref 97–108)
CHOLEST SERPL-MCNC: 291 MG/DL
CO2 SERPL-SCNC: 26 MMOL/L (ref 21–32)
CREAT SERPL-MCNC: 0.82 MG/DL (ref 0.55–1.02)
CREAT UR-MCNC: 147 MG/DL
ERYTHROCYTE [DISTWIDTH] IN BLOOD BY AUTOMATED COUNT: 13.5 % (ref 11.5–14.5)
EST. AVERAGE GLUCOSE BLD GHB EST-MCNC: 174 MG/DL
GLOBULIN SER CALC-MCNC: 3.2 G/DL (ref 2–4)
GLUCOSE SERPL-MCNC: 196 MG/DL (ref 65–100)
HBA1C MFR BLD: 7.7 % (ref 4–5.6)
HCT VFR BLD AUTO: 38.7 % (ref 35–47)
HDLC SERPL-MCNC: 59 MG/DL
HDLC SERPL: 4.9 {RATIO} (ref 0–5)
HGB BLD-MCNC: 12.6 G/DL (ref 11.5–16)
LDLC SERPL CALC-MCNC: 190 MG/DL (ref 0–100)
MCH RBC QN AUTO: 28.8 PG (ref 26–34)
MCHC RBC AUTO-ENTMCNC: 32.6 G/DL (ref 30–36.5)
MCV RBC AUTO: 88.4 FL (ref 80–99)
MICROALBUMIN UR-MCNC: 3.19 MG/DL
MICROALBUMIN/CREAT UR-RTO: 22 MG/G (ref 0–30)
NRBC # BLD: 0 K/UL (ref 0–0.01)
NRBC BLD-RTO: 0 PER 100 WBC
PLATELET # BLD AUTO: 329 K/UL (ref 150–400)
PMV BLD AUTO: 10.2 FL (ref 8.9–12.9)
POTASSIUM SERPL-SCNC: 4.6 MMOL/L (ref 3.5–5.1)
PROT SERPL-MCNC: 7.5 G/DL (ref 6.4–8.2)
RBC # BLD AUTO: 4.38 M/UL (ref 3.8–5.2)
SODIUM SERPL-SCNC: 136 MMOL/L (ref 136–145)
T4 FREE SERPL-MCNC: 1.4 NG/DL (ref 0.8–1.5)
TRIGL SERPL-MCNC: 210 MG/DL (ref ?–150)
TSH SERPL DL<=0.05 MIU/L-ACNC: 0.66 UIU/ML (ref 0.36–3.74)
VLDLC SERPL CALC-MCNC: 42 MG/DL
WBC # BLD AUTO: 5.6 K/UL (ref 3.6–11)

## 2022-04-18 PROCEDURE — 1090F PRES/ABSN URINE INCON ASSESS: CPT | Performed by: INTERNAL MEDICINE

## 2022-04-18 PROCEDURE — G0439 PPPS, SUBSEQ VISIT: HCPCS | Performed by: INTERNAL MEDICINE

## 2022-04-18 PROCEDURE — G8419 CALC BMI OUT NRM PARAM NOF/U: HCPCS | Performed by: INTERNAL MEDICINE

## 2022-04-18 PROCEDURE — G0463 HOSPITAL OUTPT CLINIC VISIT: HCPCS | Performed by: INTERNAL MEDICINE

## 2022-04-18 PROCEDURE — G8536 NO DOC ELDER MAL SCRN: HCPCS | Performed by: INTERNAL MEDICINE

## 2022-04-18 PROCEDURE — G8427 DOCREV CUR MEDS BY ELIG CLIN: HCPCS | Performed by: INTERNAL MEDICINE

## 2022-04-18 PROCEDURE — 2022F DILAT RTA XM EVC RTNOPTHY: CPT | Performed by: INTERNAL MEDICINE

## 2022-04-18 PROCEDURE — G8754 DIAS BP LESS 90: HCPCS | Performed by: INTERNAL MEDICINE

## 2022-04-18 PROCEDURE — G8752 SYS BP LESS 140: HCPCS | Performed by: INTERNAL MEDICINE

## 2022-04-18 PROCEDURE — 3017F COLORECTAL CA SCREEN DOC REV: CPT | Performed by: INTERNAL MEDICINE

## 2022-04-18 PROCEDURE — 3046F HEMOGLOBIN A1C LEVEL >9.0%: CPT | Performed by: INTERNAL MEDICINE

## 2022-04-18 PROCEDURE — 1101F PT FALLS ASSESS-DOCD LE1/YR: CPT | Performed by: INTERNAL MEDICINE

## 2022-04-18 PROCEDURE — G9899 SCRN MAM PERF RSLTS DOC: HCPCS | Performed by: INTERNAL MEDICINE

## 2022-04-18 PROCEDURE — 99214 OFFICE O/P EST MOD 30 MIN: CPT | Performed by: INTERNAL MEDICINE

## 2022-04-18 PROCEDURE — G8510 SCR DEP NEG, NO PLAN REQD: HCPCS | Performed by: INTERNAL MEDICINE

## 2022-04-18 PROCEDURE — G8400 PT W/DXA NO RESULTS DOC: HCPCS | Performed by: INTERNAL MEDICINE

## 2022-04-18 NOTE — PATIENT INSTRUCTIONS
Medicare Wellness Visit, Female     The best way to live healthy is to have a lifestyle where you eat a well-balanced diet, exercise regularly, limit alcohol use, and quit all forms of tobacco/nicotine, if applicable. Regular preventive services are another way to keep healthy. Preventive services (vaccines, screening tests, monitoring & exams) can help personalize your care plan, which helps you manage your own care. Screening tests can find health problems at the earliest stages, when they are easiest to treat. José follows the current, evidence-based guidelines published by the [facility] ([facility]) when recommending preventive services for our patients. Because we follow these guidelines, sometimes recommendations change over time as research supports it. (For example, mammograms used to be recommended annually. Even though Medicare will still pay for an annual mammogram, the newer guidelines recommend a mammogram every two years for women of average risk). Of course, you and your doctor may decide to screen more often for some diseases, based on your risk and your co-morbidities (chronic disease you are already diagnosed with). Preventive services for you include:  - Medicare offers their members a free annual wellness visit, which is time for you and your primary care provider to discuss and plan for your preventive service needs. Take advantage of this benefit every year!  -All adults over the age of 72 should receive the recommended pneumonia vaccines. Current USPSTF guidelines recommend a series of two vaccines for the best pneumonia protection.   -All adults should have a flu vaccine yearly and a tetanus vaccine every 10 years.   -All adults age 48 and older should receive the shingles vaccines (series of two vaccines).       -All adults age 38-68 who are overweight should have a diabetes screening test once every three years.   -All adults born between [birth year] and 1965 should be screened once for Hepatitis C.  -Other screening tests and preventive services for persons with diabetes include: an eye exam to screen for diabetic retinopathy, a kidney function test, a foot exam, and stricter control over your cholesterol.   -Cardiovascular screening for adults with routine risk involves an electrocardiogram (ECG) at intervals determined by your doctor.   -Colorectal cancer screenings should be done for adults age 54-65 with no increased risk factors for colorectal cancer. There are a number of acceptable methods of screening for this type of cancer. Each test has its own benefits and drawbacks. Discuss with your doctor what is most appropriate for you during your annual wellness visit. The different tests include: colonoscopy (considered the best screening method), a fecal occult blood test, a fecal DNA test, and sigmoidoscopy.    -A bone mass density test is recommended when a woman turns 65 to screen for osteoporosis. This test is only recommended one time, as a screening. Some providers will use this same test as a disease monitoring tool if you already have osteoporosis. -Breast cancer screenings are recommended every other year for women of normal risk, age 54-69.  -Cervical cancer screenings for women over age 72 are only recommended with certain risk factors.      Here is a list of your current Health Maintenance items (your personalized list of preventive services) with a due date:  Health Maintenance Due   Topic Date Due    Bone Mineral Density   Never done    COVID-19 Vaccine (3 - Booster for Pfizer series) 09/09/2021    Albumin Urine Test  10/15/2021    Diabetic Foot Care  12/04/2021    Annual Well Visit  12/05/2021    Hemoglobin A1C    02/09/2022    Cholesterol Test   02/09/2022

## 2022-04-18 NOTE — PROGRESS NOTES
This is the Subsequent Medicare Annual Wellness Exam, performed 12 months or more after the Initial AWV or the last Subsequent AWV    I have reviewed the patient's medical history in detail and updated the computerized patient record. Assessment/Plan   Education and counseling provided:  Are appropriate based on today's review and evaluation    1. Type 2 diabetes mellitus with diabetic nephropathy, without long-term current use of insulin ([facility] [address]  2. Bipolar 2 disorder ([facility] [address]  3. Invasive lobular carcinoma of right breast in female ([facility] [address]  4. Essential hypertension, benign  5. Acquired hypothyroidism  6. Gastroesophageal reflux disease with esophagitis without hemorrhage  7. Restless leg syndrome       Depression Risk Factor Screening     3 most recent PHQ Screens 4/18/2022   PHQ Not Done -   Little interest or pleasure in doing things Not at all   Feeling down, depressed, irritable, or hopeless Several days   Total Score PHQ 2 1       Alcohol & Drug Abuse Risk Screen    Do you average more than 1 drink per night or more than 7 drinks a week:  No    On any one occasion in the past three months have you have had more than 3 drinks containing alcohol:  No          Functional Ability and Level of Safety    Hearing: Hearing is good. Activities of Daily Living: The home contains: no safety equipment. Patient does total self care      Ambulation: with no difficulty     Fall Risk:  Fall Risk Assessment, last 12 mths 4/18/2022   Able to walk? Yes   Fall in past 12 months? 1   Do you feel unsteady? 0   Are you worried about falling 0   Number of falls in past 12 months 1   Fall with injury?  0      Abuse Screen:  Patient is not abused       Cognitive Screening    Has your family/caregiver stated any concerns about your memory: no     Cognitive Screening: Normal - MMSE (Mini Mental Status Exam)    Health Maintenance Due     Health Maintenance Due   Topic Date Due    Bone Densitometry (Dexa) Screening  Never done  COVID-19 Vaccine (3 - Booster for Pfizer series) 09/09/2021    MICROALBUMIN Q1  10/15/2021    Foot Exam Q1  12/04/2021    Medicare Yearly Exam  12/05/2021    A1C test (Diabetic or Prediabetic)  02/09/2022    Lipid Screen  02/09/2022       Patient Care Team   Patient Care Team:  Cat Anglin MD as PCP - Karyna Boyd MD as PCP - [facility]  Mendez Reveles MD as Physician (Obstetrics & Gynecology)  Josue Salvador MD (Breast Surgery)  KATE Gilman as Pharmacist    History     Patient Active Problem List   Diagnosis Code    Essential hypertension, benign I10    Hypothyroidism E03.9    ADD (attention deficit disorder) F98.8    GERD (gastroesophageal reflux disease) K21.9    Postmenopausal Z78.0    PTSD (post-traumatic stress disorder) F43.10    Type 2 diabetes mellitus with diabetic nephropathy, without long-term current use of insulin ([facility] [address] E11.21    History of left breast cancer Z85.3    Invasive lobular carcinoma of right breast in female [facility]) C50.911    Breast cancer ([facility] [address] C50.919     Past Medical History:   Diagnosis Date    ADD (attention deficit disorder) 8/17/2009    Breast cancer ([facility] [address] 2012    Left Breast    Depressive disorder, not elsewhere classified 8/17/2009    Essential hypertension, benign 8/17/2009    History of mammogram 02/17/2020    BI-RADS Category 3    Hypertension     Pap smear for cervical cancer screening 07/28/2016; 01/09/2020    negative, HPV negative; negative, HPV negative    Psychiatric disorder 2007    panic attacks/PTSD    PTSD (post-traumatic stress disorder)     Thyroid disease     Type 2 diabetes mellitus without complication ([address] 5/97/3738    Unspecified hypothyroidism 8/17/2009      Past Surgical History:   Procedure Laterality Date    HX BREAST LUMPECTOMY Left 2012    HX BREAST LUMPECTOMY Right 10/12/2021    RIGHT BREAST LUMPECTOMY WITH ULTRASOUND, RIGHT BREAST SENTINEL NODE BIOPSY performed by Juan C Pedro [facility] at [facility] AM[facility]    HX GYN      c section    HX HEENT  1992    partial thyroidectomy    HX TONSILLECTOMY      HX UROLOGICAL  1992    Bladder suspension    RI BREAST SURGERY PROCEDURE UNLISTED  January 2012    left breast biopsy     Current Outpatient Medications   Medication Sig Dispense Refill    pregabalin (LYRICA) 75 mg capsule Take one cap po every morning and 2 caps po every evening 90 Capsule 3    pantoprazole (PROTONIX) 40 mg tablet TAKE 1 TABLET BY MOUTH EVERY DAY 90 Tablet 1    losartan (COZAAR) 100 mg tablet TAKE 1 TABLET BY MOUTH EVERY DAY 90 Tablet 0    hydrocortisone (HYTONE) 2.5 % topical cream Apply  to affected area two (2) times a day. use thin layer 30 g 2    levothyroxine (SYNTHROID) 137 mcg tablet TAKE 1 TABLET BY MOUTH EVERY DAY 90 Tablet 2    DULoxetine (CYMBALTA) 60 mg capsule Take 1 Capsule by mouth daily for 90 days. For neuropathy pain. 90 Capsule 0    metFORMIN ER (GLUCOPHAGE XR) 500 mg tablet 2 in morning and 1 at night 270 Tablet 1    lamoTRIgine (LaMICtal) 200 mg tablet Take 1 Tablet by mouth daily. 30 Tablet 0    traZODone (DESYREL) 100 mg tablet Take 200 mg by mouth nightly.  naproxen sodium (Aleve) 220 mg tablet Take 220 mg by mouth three (3) times daily as needed.  FreeStyle Yaquelin 14 Day Sensor kit APPLY 1 SENSOR EVERY 14 DAYS TO CHECK BLOOD SUGARS DX:E11.9 2 Kit 3    flash glucose scanning reader (FREESTYLE YAQUELIN 14 DAY READER) [facility] Use to check blood sugars throughout the day 1 Each 0    DAILY MULTI-VITAMIN PO Take 1 Tab by mouth daily.  exemestane (AROMASIN) 25 mg tablet Take 1 Tablet by mouth daily for 360 days. (Patient not taking: Reported on 4/18/2022) 90 Tablet 3    meloxicam (MOBIC) 15 mg tablet TAKE 1 TABLET BY MOUTH EVERY DAY 90 Tablet 1    diclofenac (VOLTAREN) 1 % gel Apply  to affected area four (4) times daily.  (Patient not taking: Reported on 4/18/2022) 50 g 0    albuterol (PROVENTIL HFA, VENTOLIN HFA, PROAIR HFA) 90 mcg/actuation inhaler Take 2 Puffs by inhalation every four (4) hours as needed for Wheezing.  (Patient not taking: Reported on 4/18/2022) 1 Inhaler 1     No Known Allergies    Family History   Problem Relation Age of Onset    Diabetes Mother     Cancer Mother         breast    Stroke Mother    Sarah Pro Breast Cancer Mother 57        80 second time    Hypertension Father     Elevated Lipids Father     Migraines Father     Dementia Father      Social History     Tobacco Use    Smoking status: Never Smoker    Smokeless tobacco: Never Used   Substance Use Topics    Alcohol use: No     Alcohol/week: 0.0 standard drinks         Roddy Bangura MD

## 2022-05-05 ENCOUNTER — PATIENT MESSAGE (OUTPATIENT)
Dept: NEUROLOGY | Age: 67
End: 2022-05-05

## 2022-05-05 DIAGNOSIS — E11.42 DIABETIC POLYNEUROPATHY ASSOCIATED WITH TYPE 2 DIABETES MELLITUS (HCC): ICD-10-CM

## 2022-05-06 RX ORDER — DULOXETIN HYDROCHLORIDE 60 MG/1
60 CAPSULE, DELAYED RELEASE ORAL DAILY
Qty: 90 CAPSULE | Refills: 1 | Status: SHIPPED | OUTPATIENT
Start: 2022-05-06 | End: 2022-08-04

## 2022-05-06 NOTE — TELEPHONE ENCOUNTER
From: Ignacio Tsai  To: Nneka Ibarra MD  Sent: 5/5/2022 8:17 PM EDT  Subject: Lyrica    Please send a new prescription for the above as their is no refills on current bottle. Medicine combo working well so far.

## 2022-06-01 ENCOUNTER — TELEPHONE (OUTPATIENT)
Dept: ONCOLOGY | Age: 67
End: 2022-06-01

## 2022-06-18 DIAGNOSIS — I10 ESSENTIAL HYPERTENSION, BENIGN: Chronic | ICD-10-CM

## 2022-06-19 RX ORDER — LOSARTAN POTASSIUM 100 MG/1
TABLET ORAL
Qty: 90 TABLET | Refills: 0 | Status: SHIPPED | OUTPATIENT
Start: 2022-06-19 | End: 2022-06-22 | Stop reason: SDUPTHER

## 2022-06-22 DIAGNOSIS — I10 ESSENTIAL HYPERTENSION, BENIGN: Chronic | ICD-10-CM

## 2022-06-23 RX ORDER — LOSARTAN POTASSIUM 100 MG/1
100 TABLET ORAL DAILY
Qty: 90 TABLET | Refills: 0 | Status: SHIPPED | OUTPATIENT
Start: 2022-06-23 | End: 2022-09-21

## 2022-07-13 ENCOUNTER — TRANSCRIBE ORDER (OUTPATIENT)
Dept: SCHEDULING | Age: 67
End: 2022-07-13

## 2022-07-13 DIAGNOSIS — G89.29 CHRONIC LEFT SHOULDER PAIN: Primary | ICD-10-CM

## 2022-07-13 DIAGNOSIS — M25.512 CHRONIC LEFT SHOULDER PAIN: Primary | ICD-10-CM

## 2022-07-13 DIAGNOSIS — R29.898 LEFT ARM WEAKNESS: ICD-10-CM

## 2022-07-15 DIAGNOSIS — E11.9 TYPE 2 DIABETES MELLITUS WITHOUT COMPLICATION, WITHOUT LONG-TERM CURRENT USE OF INSULIN (HCC): ICD-10-CM

## 2022-07-15 RX ORDER — METFORMIN HYDROCHLORIDE 500 MG/1
TABLET, EXTENDED RELEASE ORAL
Qty: 270 TABLET | Refills: 1 | Status: SHIPPED | OUTPATIENT
Start: 2022-07-15

## 2022-07-25 ENCOUNTER — HOSPITAL ENCOUNTER (OUTPATIENT)
Dept: MRI IMAGING | Age: 67
Discharge: HOME | End: 2022-07-25
Attending: STUDENT IN AN ORGANIZED HEALTH CARE EDUCATION/TRAINING PROGRAM
Payer: MEDICARE

## 2022-07-25 ENCOUNTER — PATIENT MESSAGE (OUTPATIENT)
Dept: NEUROLOGY | Age: 67
End: 2022-07-25

## 2022-07-25 DIAGNOSIS — G62.9 SMALL FIBER NEUROPATHY: ICD-10-CM

## 2022-07-25 DIAGNOSIS — M25.512 CHRONIC LEFT SHOULDER PAIN: ICD-10-CM

## 2022-07-25 DIAGNOSIS — G89.29 CHRONIC LEFT SHOULDER PAIN: ICD-10-CM

## 2022-07-25 DIAGNOSIS — R29.898 LEFT ARM WEAKNESS: ICD-10-CM

## 2022-07-25 PROCEDURE — 73221 MRI JOINT UPR EXTREM W/O DYE: CPT

## 2022-07-25 NOTE — TELEPHONE ENCOUNTER
Timoteo Gomez MD 7/25/2022 5:24 PM EDT    Fine to add midday dose    ----- Message -----  From: Christiano Iqbal RN  Sent: 7/25/2022 2:50 PM EDT  To: Luis Garcia MD  Subject: FW: Neuropathy       ----- Message -----  From: Capri Cabral LPN  Sent: 3/63/6822 11:03 AM EDT  To: Danna Boston RN  Subject: FW: Neuropathy       ----- Message -----  From: Gab King  Sent: 7/25/2022 10:51 AM EDT  To: [facility] Nurse Pool  Subject: Neuropathy     I am doing much more walking than before. Can I add a Lyrica mid day? The other meds at current times work well.

## 2022-08-06 ENCOUNTER — PATIENT MESSAGE (OUTPATIENT)
Dept: NEUROLOGY | Age: 67
End: 2022-08-06

## 2022-08-06 DIAGNOSIS — G62.9 SMALL FIBER NEUROPATHY: ICD-10-CM

## 2022-08-09 NOTE — TELEPHONE ENCOUNTER
From: Ashkan Toro  Sent: 8/9/2022 11:20 AM EDT  To: Lavon Munoz [place]  Subject: Pregabalin    I am out of the Pregabalin tomorrow. Can the script be sent in today please?  Thanks

## 2022-08-10 RX ORDER — PREGABALIN 75 MG/1
CAPSULE ORAL
Qty: 90 CAPSULE | Refills: 3 | Status: SHIPPED | OUTPATIENT
Start: 2022-08-10

## 2022-08-22 ENCOUNTER — OFFICE VISIT (OUTPATIENT)
Dept: ONCOLOGY | Age: 67
End: 2022-08-22
Payer: MEDICARE

## 2022-08-22 ENCOUNTER — HOSPITAL ENCOUNTER (OUTPATIENT)
Dept: INFUSION THERAPY | Age: 67
Discharge: HOME | End: 2022-08-22
Payer: MEDICARE

## 2022-08-22 VITALS
HEART RATE: 72 BPM | HEIGHT: 64 IN | OXYGEN SATURATION: 97 % | DIASTOLIC BLOOD PRESSURE: 80 MMHG | SYSTOLIC BLOOD PRESSURE: 144 MMHG | BODY MASS INDEX: 27.14 KG/M2 | WEIGHT: 159 LBS | RESPIRATION RATE: 18 BRPM | TEMPERATURE: 98 F

## 2022-08-22 DIAGNOSIS — C50.911 INVASIVE LOBULAR CARCINOMA OF RIGHT BREAST IN FEMALE (HCC): ICD-10-CM

## 2022-08-22 DIAGNOSIS — C50.511 MALIGNANT NEOPLASM OF LOWER-OUTER QUADRANT OF RIGHT BREAST OF FEMALE, ESTROGEN RECEPTOR POSITIVE (HCC): Primary | ICD-10-CM

## 2022-08-22 DIAGNOSIS — Z17.0 MALIGNANT NEOPLASM OF LOWER-OUTER QUADRANT OF RIGHT BREAST OF FEMALE, ESTROGEN RECEPTOR POSITIVE (HCC): Primary | ICD-10-CM

## 2022-08-22 DIAGNOSIS — Z78.0 POSTMENOPAUSAL: ICD-10-CM

## 2022-08-22 PROCEDURE — G9899 SCRN MAM PERF RSLTS DOC: HCPCS | Performed by: INTERNAL MEDICINE

## 2022-08-22 PROCEDURE — G8417 CALC BMI ABV UP PARAM F/U: HCPCS | Performed by: INTERNAL MEDICINE

## 2022-08-22 PROCEDURE — 1101F PT FALLS ASSESS-DOCD LE1/YR: CPT | Performed by: INTERNAL MEDICINE

## 2022-08-22 PROCEDURE — 1123F ACP DISCUSS/DSCN MKR DOCD: CPT | Performed by: INTERNAL MEDICINE

## 2022-08-22 PROCEDURE — G8432 DEP SCR NOT DOC, RNG: HCPCS | Performed by: INTERNAL MEDICINE

## 2022-08-22 PROCEDURE — 99213 OFFICE O/P EST LOW 20 MIN: CPT | Performed by: INTERNAL MEDICINE

## 2022-08-22 PROCEDURE — G8754 DIAS BP LESS 90: HCPCS | Performed by: INTERNAL MEDICINE

## 2022-08-22 PROCEDURE — G8753 SYS BP > OR = 140: HCPCS | Performed by: INTERNAL MEDICINE

## 2022-08-22 PROCEDURE — 1090F PRES/ABSN URINE INCON ASSESS: CPT | Performed by: INTERNAL MEDICINE

## 2022-08-22 PROCEDURE — G8536 NO DOC ELDER MAL SCRN: HCPCS | Performed by: INTERNAL MEDICINE

## 2022-08-22 PROCEDURE — G8400 PT W/DXA NO RESULTS DOC: HCPCS | Performed by: INTERNAL MEDICINE

## 2022-08-22 PROCEDURE — G0463 HOSPITAL OUTPT CLINIC VISIT: HCPCS | Performed by: INTERNAL MEDICINE

## 2022-08-22 PROCEDURE — 3017F COLORECTAL CA SCREEN DOC REV: CPT | Performed by: INTERNAL MEDICINE

## 2022-08-22 PROCEDURE — G8427 DOCREV CUR MEDS BY ELIG CLIN: HCPCS | Performed by: INTERNAL MEDICINE

## 2022-08-22 PROCEDURE — 36415 COLL VENOUS BLD VENIPUNCTURE: CPT

## 2022-08-22 NOTE — PROGRESS NOTES
Faye Park is a 78 y.o. female Follow up for the evaluation of breast cancer. 1. Have you been to the ER, urgent care clinic since your last visit? Hospitalized since your last visit? Yes.     2. Have you seen or consulted any other health care providers outside of the [address] [place] [address] since your last visit? Include any pap smears or colon screening.  No

## 2022-08-22 NOTE — PROGRESS NOTES
Pt arrived 939 for genetic testing , vital done upstairs. labs drawn 1 stick in right ac per Jeanne . Discharged ambulatory and no stress .

## 2022-08-22 NOTE — PROGRESS NOTES
[facility] [place] [facility] [place] [address] [facility] Lance: [phone]  F: [phone]      Reason for Visit:   Stalin Pennington is a 78 y.o. female who is seen in follow up for breast cancer. Rad onc:  Dr. Carlos Corbin  Breast Surgeon: Dr. Miguel Crum    Treatment History:   1/25/2012 left breast lumpectomy:  [facility] with mixed ductal and lobular features, 2.3 cm,  Gr 2, 0/3 LN:  pT2 pN0 cM0;  ER + at 100%, NE + at 60%, HER 2 negative at [facility] ratio 1.6, sig/cell 3.75, oncotype Dx = 15; s/p lumpectomy, XRT (Dr Carlos Corbin), anastrozole for 5 years (Dr. Jimi Rivera)  8/23/21 right breast mass core bx:  ILC, gr 2, 9 mm, ER + at 95%, NE negative, HER 2 negative at [facility] 0, ki67 15%, LCIS focal, no LVI  10/12/21 right breast lumpectomy: ILC, 1.4 cm, gr 2,  0/3 LN, no LVI, pT1c pN0 cM0  mammaprint shows high risk luminal B  XRT completed 2/14/22  Exemestane 4/2022 - current    History of Present Illness: An abnormal mammogram led to the pathology above    Interval Hx: Patient presents today for follow up on exemestane. Reports gr 1 fatigue, pain to her left shoulder rated 5/10, gr 1 cough.      FH:  Mother with breast cancer at age 76 and 80; no ovarian, prostate, pancreas cancer    Past Medical History:   Diagnosis Date    ADD (attention deficit disorder) 8/17/2009    Breast cancer ([facility] [address] 2012    Left Breast    Depressive disorder, not elsewhere classified 8/17/2009    Essential hypertension, benign 8/17/2009    History of mammogram 02/17/2020    BI-RADS Category 3    Hypertension     Pap smear for cervical cancer screening 07/28/2016; 01/09/2020    negative, HPV negative; negative, HPV negative    Psychiatric disorder 2007    panic attacks/PTSD    PTSD (post-traumatic stress disorder)     Thyroid disease     Type 2 diabetes mellitus without complication ([address] 8/35/7164    Unspecified hypothyroidism 8/17/2009      Past Surgical History:   Procedure Laterality Date    HX BREAST LUMPECTOMY Left 2012    HX BREAST LUMPECTOMY Right 10/12/2021    RIGHT BREAST LUMPECTOMY WITH ULTRASOUND, RIGHT BREAST SENTINEL NODE BIOPSY performed by Han Mcknight MD at [facility]    HX GYN      c section    HX HEENT  1992    partial thyroidectomy    HX TONSILLECTOMY      HX UROLOGICAL  1992    Bladder suspension    AR BREAST SURGERY PROCEDURE UNLISTED  January 2012    left breast biopsy      Social History     Tobacco Use    Smoking status: Never    Smokeless tobacco: Never   Substance Use Topics    Alcohol use: No     Alcohol/week: 0.0 standard drinks      Family History   Problem Relation Age of Onset    Diabetes Mother     Cancer Mother         breast    Stroke Mother     Breast Cancer Mother 57        80 second time    Hypertension Father     Elevated Lipids Father     Migraines Father     Dementia Father      Current Outpatient Medications   Medication Sig    dapagliflozin (Farxiga) 5 mg tab tablet Take 1 Tablet by mouth in the morning. pregabalin (LYRICA) 75 mg capsule Take one cap po every morning and 2 caps po every evening    albuterol (PROVENTIL HFA, VENTOLIN HFA, PROAIR HFA) 90 mcg/actuation inhaler Take 2 Puffs by inhalation every four (4) hours as needed for Wheezing. metFORMIN ER (GLUCOPHAGE XR) 500 mg tablet TAKE 2 TABLETS BY MOUTH IN THE MORNING AND 1 TABLET BY MOUTH AT NIGHT    losartan (COZAAR) 100 mg tablet Take 1 Tablet by mouth daily. exemestane (AROMASIN) 25 mg tablet Take 1 Tablet by mouth daily for 360 days. pantoprazole (PROTONIX) 40 mg tablet TAKE 1 TABLET BY MOUTH EVERY DAY    levothyroxine (SYNTHROID) 137 mcg tablet TAKE 1 TABLET BY MOUTH EVERY DAY    lamoTRIgine (LaMICtal) 200 mg tablet Take 1 Tablet by mouth daily. traZODone (DESYREL) 100 mg tablet Take 200 mg by mouth nightly. flash glucose scanning reader ([employer] MICHAEL 14 DAY READER) [facility] Use to check blood sugars throughout the day    DAILY MULTI-VITAMIN PO Take 1 Tab by mouth daily.     hydrocortisone (HYTONE) 2.5 % topical cream Apply  to affected area two (2) times a day. use thin layer    naproxen sodium (Aleve) 220 mg tablet Take 220 mg by mouth three (3) times daily as needed. Free[employer] Yaquelin 14 Day Sensor kit APPLY 1 SENSOR EVERY 14 DAYS TO CHECK BLOOD SUGARS DX:E11.9     No current facility-administered medications for this visit. No Known Allergies     Review of Systems: A complete review of systems was obtained, negative except as described above. Physical Exam:     Visit Vitals  BP (!) 144/80   Pulse 72   Temp 98 °F (36.7 °C) (Temporal)   Resp 18   Ht 5' 4\" (1.626 m)   Wt 159 lb (72.1 kg)   SpO2 97%   BMI 27.29 kg/m²     ECOG PS: 0    Constitutional: Appears well-developed and well-nourished in no apparent distress      Mental status: Alert and awake, Oriented to person/place/time, Able to follow commands    Eyes: EOM normal, Sclera normal, No visible ocular discharge    HENT: Normocephalic, atraumatic    Mouth/Throat: Moist mucous membranes    External Ears normal    Neck: No visualized mass    Pulmonary/Chest: Respiratory effort normal, No visualized signs of difficulty breathing or respiratory distress, occasional dry cough    Musculoskeletal: Normal gait with no signs of ataxia, Normal range of motion of neck    Neurological: No facial asymmetry (Cranial nerve 7 motor function), No gaze palsy    Skin: No significant exanthematous lesions or discoloration noted on facial skin    Psychiatric: Normal affect, No hallucinations       Results:     Lab Results   Component Value Date/Time    WBC 5.6 04/18/2022 10:06 AM    HGB 12.6 04/18/2022 10:06 AM    HCT 38.7 04/18/2022 10:06 AM    PLATELET 009 00/09/7593 10:06 AM    MCV 88.4 04/18/2022 10:06 AM    ABS.  NEUTROPHILS 6.4 06/07/2017 03:23 PM     Lab Results   Component Value Date/Time    Sodium 136 04/18/2022 10:06 AM    Potassium 4.6 04/18/2022 10:06 AM    Chloride 104 04/18/2022 10:06 AM    CO2 26 04/18/2022 10:06 AM    Glucose 196 (H) 04/18/2022 10:06 AM    BUN 17 04/18/2022 10:06 AM    Creatinine 0.82 04/18/2022 10:06 AM    GFR est AA >60 04/18/2022 10:06 AM    GFR est non-AA >60 04/18/2022 10:06 AM    Calcium 9.4 04/18/2022 10:06 AM    Glucose (POC) 277 (H) 10/12/2021 09:47 PM    Creatinine (POC) 1.00 08/31/2021 02:19 PM     Lab Results   Component Value Date/Time    Bilirubin, total 0.3 04/18/2022 10:06 AM    ALT (SGPT) 35 04/18/2022 10:06 AM    Alk. phosphatase 104 04/18/2022 10:06 AM    Protein, total 7.5 04/18/2022 10:06 AM    Albumin 4.3 04/18/2022 10:06 AM    Globulin 3.2 04/18/2022 10:06 AM     8/31/21 MRI breast  FINDINGS:     Background parenchymal enhancement: Moderate. Lymph nodes: No lymphadenopathy. Right breast: Irregular enhancing mass with left toe and washout kinetics in the  middle third of the right breast in the 7:00 position contains a biopsy clip and  measures 3.4 x 1.1 x 1.9 cm. No extension to skin. No other suspicious  morphology or enhancement in the right breast. Scattered background foci are  increased. Skin and nipple are within normal limits. Left breast: There is no suspicious focus of morphology or temporal enhancement. Lumpectomy scar is posterior in the upper outer quadrant. Nipple angulation is  expected postsurgery. No unexpected skin thickening. Miscellaneous: Hepatic cysts are partially imaged and grossly unchanged. This  does not represent a full evaluation of the liver. IMPRESSION     1. 3.4 cm biopsy-proven carcinoma in the right breast. No evidence of  multicentric disease. 2. No lymphadenopathy. 3. No MRI evidence of malignancy in the posttreatment left breast.    Records reviewed and summarized above. Pathology report(s) reviewed above. Radiology report(s) reviewed above. Assessment/plan:   1.  Right breast ILC, 1.4 cm, gr 2,  0/3 LN, ER +, SD negative, HER 2 negative, stage IA both anatomic and prognostic  High risk mammaprint     History of L breast cancer, ER +, SD +, HER 2 negative, stage IIA, anatomic    Using [email or web address]. [email or web address], her 5 year all cause mortality risk is 9-15%; her 10 year all cause mortality risk is 34-43%; her median OS is 12-14 years . An adjuvant discussion is warranted. We discussed chemotherapy -- she has severe neuropathy and is the sole caretaker of her  with Parkinson's disease. After this discussion, she declines chemotherapy. She completed XRT with Dr. Katerin Blake 2/14/22. Since she was on anastrozole previously, discussed exemestane 25 mg daily. She started this 4/2022 and tolerating well. She is due for bilateral mammogram, ordered. Dexa ordered, but not scheduled    Follow-up after early breast cancer was discussed. I recommend follow-up as defined by the American Society of Clinical Oncology and [facility]. This includes a visit to a health care professional every 3-6 months for 3 years, then every 6-12 months for 2 years, and then yearly as well as mammograms yearly. 2. Emotional well being:  She has excellent support and is coping well with her disease    3. Genetic testing:   discussed potential ramifications of a positive test including the potential need for bilateral mastectomies and bilateral oophorectomies and the risk then for her family members to also have a mutation. VUS also discussed. Sent at home testing but she feels she did not do this. I could not find prior testing, but even if it has been done, it was done prior to 2014 and should be repeated. Will perform blood testing today. 4. Right shoulder pain: patient reports due to rotator cuff tear. Following with [facility] and plans for shoulder replacement 11/2022.     5. Cough: nonproductive, 1 month post COVID and feels this is slowly improving. I saw and evaluated the patient on 8/22/22 and discussed care with collaborating provider, Dr. Diana Shipman. This signature serves as Sara Berman's attestation.      I personally saw and evaluated the patient and performed the entire medical decision making. The history, physical exam, and documentation were performed by Scarlett Nesbitt NP. I reviewed and verified the above documentation and modified it as needed. Right breast cancer, ER +, on exemestane, tolerating well, REA. Mammogram ordered. Dexa was ordered, but not scheduled, number given to schedule. S/p covid19. Genetic testing not done, she prefers blood testing, will perform today. RTC 6 months      I appreciate the opportunity to participate in Ms. Thuy Sykes's care. Signed By: Larissa Schlatter, MD      No orders of the defined types were placed in this encounter.

## 2022-09-21 DIAGNOSIS — I10 ESSENTIAL HYPERTENSION, BENIGN: Chronic | ICD-10-CM

## 2022-09-21 RX ORDER — LOSARTAN POTASSIUM 100 MG/1
TABLET ORAL
Qty: 90 TABLET | Refills: 0 | Status: SHIPPED | OUTPATIENT
Start: 2022-09-21

## 2022-09-26 ENCOUNTER — HOSPITAL ENCOUNTER (OUTPATIENT)
Dept: MAMMOGRAPHY | Age: 67
Discharge: HOME | End: 2022-09-26
Attending: INTERNAL MEDICINE
Payer: MEDICARE

## 2022-09-26 DIAGNOSIS — Z78.0 POSTMENOPAUSAL: ICD-10-CM

## 2022-09-26 PROCEDURE — 77080 DXA BONE DENSITY AXIAL: CPT

## 2022-10-04 ENCOUNTER — HOSPITAL ENCOUNTER (OUTPATIENT)
Dept: MAMMOGRAPHY | Age: 67
Discharge: HOME | End: 2022-10-04
Attending: INTERNAL MEDICINE
Payer: MEDICARE

## 2022-10-04 DIAGNOSIS — C50.911 INVASIVE LOBULAR CARCINOMA OF RIGHT BREAST IN FEMALE (HCC): ICD-10-CM

## 2022-10-04 DIAGNOSIS — Z78.0 POSTMENOPAUSAL: ICD-10-CM

## 2022-10-04 DIAGNOSIS — Z17.0 MALIGNANT NEOPLASM OF LOWER-OUTER QUADRANT OF RIGHT BREAST OF FEMALE, ESTROGEN RECEPTOR POSITIVE (HCC): ICD-10-CM

## 2022-10-04 DIAGNOSIS — C50.511 MALIGNANT NEOPLASM OF LOWER-OUTER QUADRANT OF RIGHT BREAST OF FEMALE, ESTROGEN RECEPTOR POSITIVE (HCC): ICD-10-CM

## 2022-10-04 PROCEDURE — 77062 BREAST TOMOSYNTHESIS BI: CPT

## 2022-10-10 ENCOUNTER — OFFICE VISIT (OUTPATIENT)
Dept: SURGERY | Age: 67
End: 2022-10-10
Payer: MEDICARE

## 2022-10-10 VITALS — WEIGHT: 158 LBS | BODY MASS INDEX: 27.12 KG/M2

## 2022-10-10 DIAGNOSIS — Z85.3 HISTORY OF LEFT BREAST CANCER: Primary | ICD-10-CM

## 2022-10-10 PROCEDURE — 1090F PRES/ABSN URINE INCON ASSESS: CPT | Performed by: SURGERY

## 2022-10-10 PROCEDURE — G8417 CALC BMI ABV UP PARAM F/U: HCPCS | Performed by: SURGERY

## 2022-10-10 PROCEDURE — 1101F PT FALLS ASSESS-DOCD LE1/YR: CPT | Performed by: SURGERY

## 2022-10-10 PROCEDURE — G8432 DEP SCR NOT DOC, RNG: HCPCS | Performed by: SURGERY

## 2022-10-10 PROCEDURE — G8756 NO BP MEASURE DOC: HCPCS | Performed by: SURGERY

## 2022-10-10 PROCEDURE — 1123F ACP DISCUSS/DSCN MKR DOCD: CPT | Performed by: SURGERY

## 2022-10-10 PROCEDURE — G8536 NO DOC ELDER MAL SCRN: HCPCS | Performed by: SURGERY

## 2022-10-10 PROCEDURE — 3017F COLORECTAL CA SCREEN DOC REV: CPT | Performed by: SURGERY

## 2022-10-10 PROCEDURE — G8399 PT W/DXA RESULTS DOCUMENT: HCPCS | Performed by: SURGERY

## 2022-10-10 PROCEDURE — 99213 OFFICE O/P EST LOW 20 MIN: CPT | Performed by: SURGERY

## 2022-10-10 PROCEDURE — G8427 DOCREV CUR MEDS BY ELIG CLIN: HCPCS | Performed by: SURGERY

## 2022-10-10 PROCEDURE — G9899 SCRN MAM PERF RSLTS DOC: HCPCS | Performed by: SURGERY

## 2022-10-10 NOTE — PROGRESS NOTES
HISTORY OF PRESENT ILLNESS  Mago Moon is a 75 y.o. female. HPI  ESTABLISHED patient here for FOLLOW UP visit for history of breast cancer . Breast history -   Referring -   1/25/2012 left breast lumpectomy:  [facility] with mixed ductal and lobular features, 2.3 cm,  Gr 2, 0/3 LN:  pT2 pN0 cM0;  ER + at 100%, FL + at 60%, HER 2 negative at [facility] ratio 1.6, sig/cell 3.75, oncotype Dx = 15; s/p lumpectomy, XRT (Dr Trista Pugh), anastrozole for 5 years (Dr. Esvin Mitchell)  8/23/21 right breast core bx:  ILC, gr 2, 9 mm, ER + at 95%, FL negative, HER 2 negative at [facility] 0, ki67 15%, LCIS focal, no LVI  10/12/21 right breast lumpectomy: ILC, 1.4 cm, gr 2,  0/3 LN, no LVI - Dr. Laura Stockton shows high risk luminal B  Referral to Dr. Anna Liao - declined chemo, will start on AI after XRT        Family history -   Mother with breast cancer at age 76 and 80; no ovarian, prostate, pancreas cancer  Genetic testing - Dr. Bel Liao      Review of Systems   All other systems reviewed and are negative.           Past Medical History:   Diagnosis Date    ADD (attention deficit disorder) 8/17/2009    Breast cancer ([facility] [address] 2012    Left Breast    Depressive disorder, not elsewhere classified 8/17/2009    Essential hypertension, benign 8/17/2009    History of mammogram 02/17/2020    BI-RADS Category 3    Hypertension     Pap smear for cervical cancer screening 07/28/2016; 01/09/2020    negative, HPV negative; negative, HPV negative    Psychiatric disorder 2007    panic attacks/PTSD    PTSD (post-traumatic stress disorder)     Thyroid disease     Type 2 diabetes mellitus without complication ([facility] [address] 1/88/9568    Unspecified hypothyroidism 8/17/2009       Past Surgical History:   Procedure Laterality Date    HX BREAST LUMPECTOMY Left 2012    HX BREAST LUMPECTOMY Right 10/12/2021    RIGHT BREAST LUMPECTOMY WITH ULTRASOUND, RIGHT BREAST SENTINEL NODE BIOPSY performed by Alia Riggins MD at [address] [place] [address] HX GYN      c section    HX HEENT  1992    partial thyroidectomy    HX TONSILLECTOMY      HX UROLOGICAL  1992    Bladder suspension    SD BREAST SURGERY PROCEDURE UNLISTED  January 2012    left breast biopsy       Social History     Socioeconomic History    Marital status:      Spouse name: Not on file    Number of children: Not on file    Years of education: Not on file    Highest education level: Not on file   Occupational History    Not on file   Tobacco Use    Smoking status: Never    Smokeless tobacco: Never   Substance and Sexual Activity    Alcohol use: No     Alcohol/week: 0.0 standard drinks    Drug use: No    Sexual activity: Never     Partners: Male   Other Topics Concern    Not on file   Social History Narrative    Not on file     Social Determinants of Health     Financial Resource Strain: Not on file   Food Insecurity: Not on file   Transportation Needs: Not on file   Physical Activity: Not on file   Stress: Not on file   Social Connections: Not on file   Intimate Partner Violence: Not on file   Housing Stability: Not on file       Current Outpatient Medications on File Prior to Visit   Medication Sig Dispense Refill    losartan (COZAAR) 100 mg tablet TAKE 1 TABLET BY MOUTH EVERY DAY 90 Tablet 0    dapagliflozin (Farxiga) 5 mg tab tablet Take 1 Tablet by mouth in the morning. 90 Tablet 0    albuterol (PROVENTIL HFA, VENTOLIN HFA, PROAIR HFA) 90 mcg/actuation inhaler Take 2 Puffs by inhalation every four (4) hours as needed for Wheezing. 18 g 2    metFORMIN ER (GLUCOPHAGE XR) 500 mg tablet TAKE 2 TABLETS BY MOUTH IN THE MORNING AND 1 TABLET BY MOUTH AT NIGHT 270 Tablet 1    exemestane (AROMASIN) 25 mg tablet Take 1 Tablet by mouth daily for 360 days.  90 Tablet 3    pantoprazole (PROTONIX) 40 mg tablet TAKE 1 TABLET BY MOUTH EVERY DAY 90 Tablet 1    levothyroxine (SYNTHROID) 137 mcg tablet TAKE 1 TABLET BY MOUTH EVERY DAY 90 Tablet 2    lamoTRIgine (LaMICtal) 200 mg tablet Take 1 Tablet by mouth daily. 30 Tablet 0    traZODone (DESYREL) 100 mg tablet Take 200 mg by mouth nightly. flash glucose scanning reader ([employer]YLE MICHAEL 14 DAY READER) [facility] Use to check blood sugars throughout the day 1 Each 0    DAILY MULTI-VITAMIN PO Take 1 Tab by mouth daily. pregabalin (LYRICA) 75 mg capsule Take one cap po every morning and 2 caps po every evening 90 Capsule 3     No current facility-administered medications on file prior to visit. No Known Allergies    OB History    No obstetric history on file. Obstetric Comments   Menarche: 5   LMP:age 53. # of Children:  3 Age at Delivery of First Child: 24.   Hysterectomy/oophorectomy: no/no. Breast Bx: left 2012. Hx of Breast Feeding: no  BCP: yes [id number]. Hormone therapy: yes                  ROS      Physical Exam  Exam conducted with a chaperone present. Constitutional:       Appearance: She is well-developed. She is not diaphoretic. HENT:      Head: Normocephalic and atraumatic. Right Ear: External ear normal.      Left Ear: External ear normal.   Eyes:      General: No scleral icterus. Right eye: No discharge. Left eye: No discharge. Pupils: Pupils are equal, round, and reactive to light. Neck:      Thyroid: No thyromegaly. Vascular: No JVD. Trachea: No tracheal deviation. Cardiovascular:      Rate and Rhythm: Normal rate and regular rhythm. Heart sounds: Normal heart sounds. Pulmonary:      Effort: Pulmonary effort is normal. No tachypnea, accessory muscle usage or respiratory distress. Breath sounds: Normal breath sounds. No stridor. Chest:   Breasts:     Breasts are symmetrical.      Right: No inverted nipple, mass, nipple discharge, skin change or tenderness. Left: No inverted nipple, mass, nipple discharge, skin change or tenderness. Abdominal:      General: There is no distension. Palpations: Abdomen is soft. There is no mass. Tenderness:  There is no abdominal tenderness. Musculoskeletal:         General: Normal range of motion. Cervical back: Normal range of motion and neck supple. Lymphadenopathy:      Cervical: No cervical adenopathy. Skin:     General: Skin is warm and dry. Neurological:      Mental Status: She is alert and oriented to person, place, and time. Psychiatric:         Speech: Speech normal.         Behavior: Behavior normal.         Thought Content: Thought content normal.         Judgment: Judgment normal.             ASSESSMENT and PLAN    ICD-10-CM ICD-9-CM    1. History of left breast cancer  Z85.3 V10.3         Patient presents to follow up on history of breast cancer and is doing well overall. Upon physical examination noted normal. Patient stated her genetic testing is still pending. Follow up in 1 year. This plan was reviewed with the patient and patient agrees. All questions were answered. Total time spent was 20 minutes.     Written by Zia Vazquez, as dictated by Dr. Gabriela Carpio MD.

## 2022-10-10 NOTE — PROGRESS NOTES
HISTORY OF PRESENT ILLNESS  Ny Barragan is a 75 y.o. female. HPI ESTABLISHED patient here for FOLLOW UP visit for Hx of breast cancer . Breast history -   Referring -   1/25/2012 left breast lumpectomy:  [facility] with mixed ductal and lobular features, 2.3 cm,  Gr 2, 0/3 LN:  pT2 pN0 cM0;  ER + at 100%, OK + at 60%, HER 2 negative at [facility] ratio 1.6, sig/cell 3.75, oncotype Dx = 15; s/p lumpectomy, XRT (Dr Denise Ann), anastrozole for 5 years (Dr. Josie Hathaway)  8/23/21 right breast core bx:  ILC, gr 2, 9 mm, ER + at 95%, OK negative, HER 2 negative at IHC 0, ki67 15%, LCIS focal, no LVI  10/12/21 right breast lumpectomy: ILC, 1.4 cm, gr 2,  0/3 LN, no LVI - Dr. Vonna Meckel shows high risk luminal B  Referral to Dr. Klarissa Jj - declined chemo, will start on AI after XRT        Family history -   Mother with breast cancer at age 76 and 80; no ovarian, prostate, pancreas cancer  Genetic testing - Dr. Branden Jj     Review of Systems   All other systems reviewed and are negative.     Physical Exam    ASSESSMENT and PLAN  {ASSESSMENT/PLAN:[id number]}

## 2022-11-02 DIAGNOSIS — K21.00 GERD WITH ESOPHAGITIS: ICD-10-CM

## 2022-11-02 RX ORDER — PANTOPRAZOLE SODIUM 40 MG/1
TABLET, DELAYED RELEASE ORAL
Qty: 90 TABLET | Refills: 1 | Status: SHIPPED | OUTPATIENT
Start: 2022-11-02

## 2022-11-03 ENCOUNTER — PATIENT MESSAGE (OUTPATIENT)
Dept: NEUROLOGY | Age: 67
End: 2022-11-03

## 2022-11-03 DIAGNOSIS — R20.2 PARESTHESIA: ICD-10-CM

## 2022-11-03 DIAGNOSIS — G62.9 SMALL FIBER NEUROPATHY: Primary | ICD-10-CM

## 2022-11-03 NOTE — PROGRESS NOTES
Called to patient to discuss bone density results. No answer, left voicemail to return call to office.

## 2022-11-08 RX ORDER — DULOXETIN HYDROCHLORIDE 60 MG/1
60 CAPSULE, DELAYED RELEASE ORAL DAILY
Qty: 30 CAPSULE | Refills: 3 | Status: SHIPPED | OUTPATIENT
Start: 2022-11-08 | End: 2022-11-09 | Stop reason: SDUPTHER

## 2022-11-08 NOTE — TELEPHONE ENCOUNTER
Ignacio Lester MD 11/8/2022 10:10 AM EST    OK to fill Cymbalta    ----- Message -----  From: Elie Bajwa LPN  Sent: 26/0/7945 10:44 AM EST  To: Olga Whitaker MD  Subject: Mardee Spurling: New script for refill       ----- Message -----  From: Claudia Rodriguez  Sent: 11/3/2022 1:04 PM EDT  To: [facility] Nurse [place]  Subject: New script for refill     I need a refill within 5 days of Duloxetine hcl 60 mg. This med is not is my medicine profile.  Thanks

## 2022-11-09 DIAGNOSIS — R20.2 PARESTHESIA: ICD-10-CM

## 2022-11-09 DIAGNOSIS — G62.9 SMALL FIBER NEUROPATHY: ICD-10-CM

## 2022-11-16 RX ORDER — DULOXETIN HYDROCHLORIDE 60 MG/1
60 CAPSULE, DELAYED RELEASE ORAL DAILY
Qty: 30 CAPSULE | Refills: 3 | Status: SHIPPED | OUTPATIENT
Start: 2022-11-16

## 2022-12-07 ENCOUNTER — TELEPHONE (OUTPATIENT)
Dept: ONCOLOGY | Age: 67
End: 2022-12-07

## 2022-12-07 NOTE — TELEPHONE ENCOUNTER
Patient called and stated that she was returning a c all about her bone density results. Requested a call back.      # [phone]

## 2022-12-14 ENCOUNTER — TELEPHONE (OUTPATIENT)
Dept: ONCOLOGY | Age: 67
End: 2022-12-14

## 2022-12-14 NOTE — TELEPHONE ENCOUNTER
Patient called and stated that she would like a call back to get her bone density results.      # [phone]

## 2022-12-15 NOTE — TELEPHONE ENCOUNTER
Returned call to patient. No answer. Left voicemail asking for returned call or I will call again later today.

## 2022-12-15 NOTE — TELEPHONE ENCOUNTER
Returned call to patient. Reviewed bone density results with patient and recommendation to start 2000 international units vitamin D3 and 2-3 servings of dietary calcium per day. We discussed the data from [address] [address], [address] [facility] 2013, for the meta-analysis for adjuvant bisphosphonate use in breast cancer. We discussed that in postmenopausal women, it appears that the bisphosphate zolendronic acid, given as in ABCSG 12 at 4 mg IV q 6 months x 3 years, is beneficial in improving bone DFS and OS. We discussed side effects such as ONJ and the need to avoid invasive dental procedures while on these medications, renal damage, and hypocalcemia. She stated she would like to think about bisphosphonate therapy and will call us back or discuss at next visit. She had no further questions or concerns.

## 2023-02-22 ENCOUNTER — HOSPITAL ENCOUNTER (OUTPATIENT)
Dept: RADIATION THERAPY | Age: 68
Discharge: HOME | End: 2023-02-22

## 2023-03-02 ENCOUNTER — TELEPHONE (OUTPATIENT)
Dept: ONCOLOGY | Age: 68
End: 2023-03-02

## 2023-03-07 DIAGNOSIS — R20.2 PARESTHESIA: ICD-10-CM

## 2023-03-07 DIAGNOSIS — G62.9 SMALL FIBER NEUROPATHY: ICD-10-CM

## 2023-03-08 RX ORDER — DULOXETIN HYDROCHLORIDE 60 MG/1
60 CAPSULE, DELAYED RELEASE ORAL DAILY
Qty: 30 CAPSULE | Refills: 3 | Status: SHIPPED | OUTPATIENT
Start: 2023-03-08

## 2023-03-19 DIAGNOSIS — E03.9 ACQUIRED HYPOTHYROIDISM: ICD-10-CM

## 2023-03-19 RX ORDER — LEVOTHYROXINE SODIUM 137 UG/1
TABLET ORAL
Qty: 90 TABLET | Refills: 0 | Status: SHIPPED | OUTPATIENT
Start: 2023-03-19

## 2023-03-20 PROBLEM — E11.21 TYPE 2 DIABETES MELLITUS WITH DIABETIC NEPHROPATHY, WITHOUT LONG-TERM CURRENT USE OF INSULIN (HCC): Status: RESOLVED | Noted: 2018-04-16 | Resolved: 2023-03-20

## 2023-03-20 NOTE — PROGRESS NOTES
Pallavi Orozco (: 1955) is a 75 y.o. female, established patient, here for evaluation of the following chief complaint(s):  No chief complaint on file. ASSESSMENT/PLAN:  Below is the assessment and plan developed based on review of pertinent history, physical exam, labs, studies, and medications. 1. Type 2 diabetes mellitus without complication, without long-term current use of insulin (HCC)-cont metformin and farxiga   2. Malignant neoplasm of lower-outer quadrant of right breast of female, estrogen receptor positive (HCC)-cont aromasin and tolerating medicine   3. Essential hypertension, benign- at goal cont with losartan   4. Small fiber neuropathy-cont with cymbalta, lyrica   5. Acquired hypothyroidism-cont with levothyroxine   6. Gastroesophageal reflux disease with esophagitis without hemorrhage-cont with protonix and tolerating medicine   7. Moderate episode of recurrent major depressive disorder (HCC)-cont with trazodone and vyvanse    No follow-ups on file. Metformin, farxiga   Aromasin   Lsoartan   Cymbalta , lyrica   Levothyroxine   Protonix   Lyrica , metformin , trazodone ; taking vyvanse     SUBJECTIVE/OBJECTIVE:  HPI  Breast cancer: Pt underwent a lumpectomy and radiation, all of which was overseen by Dr. Juan Francisco Stewart and Dr. Tony Torres. She started on aromasin and had a BMD done recently and they recommended bisphosphonate    Depression: Pt currently followed by a psych NP at [facility] and undergoing counseling. She is doing well and feeling well      Diabetic Review of Systems - medication compliance: compliant all of the time. Other symptoms and concerns:   Lab Results   Component Value Date/Time    Hemoglobin A1c 7.7 (H) 2022 10:06 AM    Hemoglobin A1c (POC) 6.4 2020 02:09 PM      GERD: Pt states that her sx are well-managed.         Hypertension ROS: taking medications as instructed, no medication side effects noted, no TIA's, no chest pain on exertion, no dyspnea on exertion, no swelling of ankles. BP in office today is 130/80. SUBJECTIVE: Anu Lacey is a 75 y.o. female here for follow up of hypothyroidism. Lab Results   Component Value Date/Time    TSH 0.66 04/18/2022 10:06 AM     Thyroid ROS: denies fatigue, weight changes, heat/cold intolerance, bowel/skin changes or CVS symptoms. She is going for sciatica and needs to go for PT ; nad left shoulder replaced     Review of Systems   All other systems reviewed and are negative. Physical Exam  Vitals and nursing note reviewed. Constitutional:       Appearance: She is well-developed. HENT:      Head: Normocephalic and atraumatic. Right Ear: External ear normal.      Left Ear: External ear normal.      Nose: Nose normal.   Eyes:      Conjunctiva/sclera: Conjunctivae normal.   Neck:      Thyroid: No thyroid mass or thyromegaly. Vascular: No carotid bruit. Cardiovascular:      Rate and Rhythm: Normal rate and regular rhythm. Heart sounds: Normal heart sounds. Pulmonary:      Effort: Pulmonary effort is normal.      Breath sounds: Normal breath sounds. Abdominal:      General: Bowel sounds are normal.      Palpations: Abdomen is soft. Musculoskeletal:         General: Normal range of motion. Cervical back: Normal range of motion and neck supple. Skin:     General: Skin is warm and dry. Findings: No abrasion or rash. Neurological:      Mental Status: She is alert and oriented to person, place, and time. Cranial Nerves: No cranial nerve deficit. Sensory: No sensory deficit. Coordination: Coordination normal.   Psychiatric:         Behavior: Behavior normal.         Thought Content:  Thought content normal.         Judgment: Judgment normal.         On this date 03/22/2023 I have spent 30 minutes reviewing previous notes, test results and face to face with the patient discussing the diagnosis and importance of compliance with the treatment plan as well as documenting on the day of the visit. An electronic signature was used to authenticate this note.   -- Luis Thomas MD

## 2023-03-22 ENCOUNTER — OFFICE VISIT (OUTPATIENT)
Dept: INTERNAL MEDICINE CLINIC | Age: 68
End: 2023-03-22
Payer: MEDICARE

## 2023-03-22 VITALS
SYSTOLIC BLOOD PRESSURE: 139 MMHG | HEIGHT: 64 IN | BODY MASS INDEX: 26.73 KG/M2 | RESPIRATION RATE: 14 BRPM | DIASTOLIC BLOOD PRESSURE: 82 MMHG | TEMPERATURE: 98.3 F | OXYGEN SATURATION: 93 % | WEIGHT: 156.6 LBS | HEART RATE: 102 BPM

## 2023-03-22 DIAGNOSIS — F33.1 MODERATE EPISODE OF RECURRENT MAJOR DEPRESSIVE DISORDER (HCC): ICD-10-CM

## 2023-03-22 DIAGNOSIS — E11.9 TYPE 2 DIABETES MELLITUS WITHOUT COMPLICATION, WITHOUT LONG-TERM CURRENT USE OF INSULIN (HCC): Primary | ICD-10-CM

## 2023-03-22 DIAGNOSIS — G62.9 SMALL FIBER NEUROPATHY: ICD-10-CM

## 2023-03-22 DIAGNOSIS — I10 ESSENTIAL HYPERTENSION, BENIGN: ICD-10-CM

## 2023-03-22 DIAGNOSIS — Z17.0 MALIGNANT NEOPLASM OF LOWER-OUTER QUADRANT OF RIGHT BREAST OF FEMALE, ESTROGEN RECEPTOR POSITIVE (HCC): ICD-10-CM

## 2023-03-22 DIAGNOSIS — E03.9 ACQUIRED HYPOTHYROIDISM: ICD-10-CM

## 2023-03-22 DIAGNOSIS — C50.511 MALIGNANT NEOPLASM OF LOWER-OUTER QUADRANT OF RIGHT BREAST OF FEMALE, ESTROGEN RECEPTOR POSITIVE (HCC): ICD-10-CM

## 2023-03-22 DIAGNOSIS — K21.00 GASTROESOPHAGEAL REFLUX DISEASE WITH ESOPHAGITIS WITHOUT HEMORRHAGE: ICD-10-CM

## 2023-03-22 PROCEDURE — G9899 SCRN MAM PERF RSLTS DOC: HCPCS | Performed by: INTERNAL MEDICINE

## 2023-03-22 PROCEDURE — G8510 SCR DEP NEG, NO PLAN REQD: HCPCS | Performed by: INTERNAL MEDICINE

## 2023-03-22 PROCEDURE — 99214 OFFICE O/P EST MOD 30 MIN: CPT | Performed by: INTERNAL MEDICINE

## 2023-03-22 PROCEDURE — 3046F HEMOGLOBIN A1C LEVEL >9.0%: CPT | Performed by: INTERNAL MEDICINE

## 2023-03-22 PROCEDURE — 2022F DILAT RTA XM EVC RTNOPTHY: CPT | Performed by: INTERNAL MEDICINE

## 2023-03-22 PROCEDURE — G8417 CALC BMI ABV UP PARAM F/U: HCPCS | Performed by: INTERNAL MEDICINE

## 2023-03-22 PROCEDURE — 1090F PRES/ABSN URINE INCON ASSESS: CPT | Performed by: INTERNAL MEDICINE

## 2023-03-22 PROCEDURE — G8399 PT W/DXA RESULTS DOCUMENT: HCPCS | Performed by: INTERNAL MEDICINE

## 2023-03-22 PROCEDURE — G0463 HOSPITAL OUTPT CLINIC VISIT: HCPCS | Performed by: INTERNAL MEDICINE

## 2023-03-22 PROCEDURE — 1101F PT FALLS ASSESS-DOCD LE1/YR: CPT | Performed by: INTERNAL MEDICINE

## 2023-03-22 PROCEDURE — G8536 NO DOC ELDER MAL SCRN: HCPCS | Performed by: INTERNAL MEDICINE

## 2023-03-22 PROCEDURE — 3017F COLORECTAL CA SCREEN DOC REV: CPT | Performed by: INTERNAL MEDICINE

## 2023-03-22 PROCEDURE — G8427 DOCREV CUR MEDS BY ELIG CLIN: HCPCS | Performed by: INTERNAL MEDICINE

## 2023-03-22 RX ORDER — LISDEXAMFETAMINE DIMESYLATE 50 MG/1
CAPSULE ORAL
COMMUNITY
Start: 2023-03-13

## 2023-04-24 ENCOUNTER — PATIENT MESSAGE (OUTPATIENT)
Dept: ONCOLOGY | Age: 68
End: 2023-04-24

## 2023-04-24 DIAGNOSIS — C50.911 INVASIVE LOBULAR CARCINOMA OF RIGHT BREAST IN FEMALE (HCC): Primary | ICD-10-CM

## 2023-04-24 RX ORDER — EXEMESTANE 25 MG/1
25 TABLET ORAL DAILY
Qty: 90 TABLET | Refills: 1 | Status: SHIPPED | OUTPATIENT
Start: 2023-04-24 | End: 2023-04-28

## 2023-04-28 DIAGNOSIS — C50.911 INVASIVE LOBULAR CARCINOMA OF RIGHT BREAST IN FEMALE (HCC): ICD-10-CM

## 2023-04-28 RX ORDER — EXEMESTANE 25 MG/1
TABLET ORAL
Qty: 90 TABLET | Refills: 1 | Status: SHIPPED | OUTPATIENT
Start: 2023-04-28

## 2023-05-05 DIAGNOSIS — K21.00 GERD WITH ESOPHAGITIS: ICD-10-CM

## 2023-05-05 RX ORDER — PANTOPRAZOLE SODIUM 40 MG/1
TABLET, DELAYED RELEASE ORAL
Qty: 90 TABLET | Refills: 1 | Status: SHIPPED | OUTPATIENT
Start: 2023-05-05

## 2023-05-16 DIAGNOSIS — G62.9 NEUROPATHY: Primary | ICD-10-CM

## 2023-05-16 RX ORDER — PREGABALIN 75 MG/1
CAPSULE ORAL
Qty: 60 CAPSULE | Refills: 1 | Status: SHIPPED | OUTPATIENT
Start: 2023-05-16 | End: 2023-07-15

## 2023-05-30 ENCOUNTER — TELEPHONE (OUTPATIENT)
Age: 68
End: 2023-05-30

## 2023-06-10 DIAGNOSIS — I10 ESSENTIAL (PRIMARY) HYPERTENSION: ICD-10-CM

## 2023-06-10 DIAGNOSIS — E03.9 HYPOTHYROIDISM, UNSPECIFIED: ICD-10-CM

## 2023-06-11 RX ORDER — LOSARTAN POTASSIUM 100 MG/1
TABLET ORAL
Qty: 90 TABLET | Refills: 0 | Status: SHIPPED | OUTPATIENT
Start: 2023-06-11

## 2023-06-11 RX ORDER — LEVOTHYROXINE SODIUM 137 UG/1
TABLET ORAL
Qty: 90 TABLET | Refills: 0 | Status: SHIPPED | OUTPATIENT
Start: 2023-06-11

## 2023-06-26 ENCOUNTER — PATIENT MESSAGE (OUTPATIENT)
Age: 68
End: 2023-06-26

## 2023-06-26 DIAGNOSIS — G62.9 NEUROPATHY: ICD-10-CM

## 2023-06-26 RX ORDER — PREGABALIN 75 MG/1
CAPSULE ORAL
Qty: 60 CAPSULE | Refills: 1 | Status: SHIPPED | OUTPATIENT
Start: 2023-06-26 | End: 2023-08-25

## 2023-07-05 DIAGNOSIS — G62.9 POLYNEUROPATHY, UNSPECIFIED: Primary | ICD-10-CM

## 2023-07-06 RX ORDER — DULOXETIN HYDROCHLORIDE 60 MG/1
60 CAPSULE, DELAYED RELEASE ORAL DAILY
Qty: 30 CAPSULE | Refills: 0 | Status: SHIPPED | OUTPATIENT
Start: 2023-07-06

## 2023-07-26 ENCOUNTER — TELEPHONE (OUTPATIENT)
Age: 68
End: 2023-07-26

## 2023-07-26 DIAGNOSIS — G62.9 POLYNEUROPATHY, UNSPECIFIED: ICD-10-CM

## 2023-07-26 RX ORDER — DULOXETIN HYDROCHLORIDE 60 MG/1
60 CAPSULE, DELAYED RELEASE ORAL DAILY
Qty: 90 CAPSULE | Refills: 0 | Status: SHIPPED | OUTPATIENT
Start: 2023-07-26

## 2023-07-26 NOTE — TELEPHONE ENCOUNTER
Patient sent an appointment request with limited availability in her schedule. I scheduled her a follow up with Wilbur on 7/26/2023 but she didn't see her "[employer] message in time. I rescheduled her for a requested Thursday morning which is 11/30/23. She needs her Duloxetine refilled at the [facility] on [address].

## 2023-07-26 NOTE — TELEPHONE ENCOUNTER
DULoxetine (CYMBALTA) 60 MG extended release capsule 90 capsule 0 7/26/2023     Sig - Route:  Take 1 capsule by mouth daily - Oral    Sent to pharmacy as: DULoxetine HCl 60 MG Oral Capsule Delayed Release Particles (CYMBALTA)    Cosign for Ordering: Accepted by Anita Guaman MD on 7/26/2023 11:39 AM    E-Prescribing Status: Receipt confirmed by pharmacy (7/26/2023 11:31 AM EDT)

## 2023-08-01 ENCOUNTER — PATIENT MESSAGE (OUTPATIENT)
Age: 68
End: 2023-08-01

## 2023-08-01 DIAGNOSIS — E11.42 TYPE 2 DIABETES MELLITUS WITH DIABETIC POLYNEUROPATHY, UNSPECIFIED WHETHER LONG TERM INSULIN USE (HCC): Primary | ICD-10-CM

## 2023-08-01 NOTE — TELEPHONE ENCOUNTER
From: Yulisa Lopez  To: Dr. Montiel Self: 8/1/2023 10:41 AM EDT  Subject: Johnathan Wilson    The above med is a neuropathic application for feet that a relative told me about. It is a prescription but has to be applied by a medical professional. Do you know about this? If so can someone apply it to my feet? I have to wait 1/2 hour after application to look for side effects. It doesn't have to be a doctor. I am willing to try anything that will be additional help.  Thanks

## 2023-08-04 RX ORDER — CAPSAICIN 8 %
KIT TOPICAL ONCE
Qty: 1 EACH | Refills: 3 | Status: SHIPPED | OUTPATIENT
Start: 2023-08-04 | End: 2023-08-04

## 2023-08-04 RX ORDER — METFORMIN HYDROCHLORIDE 500 MG/1
TABLET, EXTENDED RELEASE ORAL
Qty: 270 TABLET | Refills: 0 | Status: SHIPPED | OUTPATIENT
Start: 2023-08-04

## 2023-08-04 NOTE — TELEPHONE ENCOUNTER
Regarding: Kareem Garcia: [phone]  ----- Message from Fuentes Chen RN sent at 2023  4:02 PM EDT -----  Pended order for your review     ----- Message from [place] to Purnell Burkitt, MD sent at 2023 10:41 AM -----   The above med is a neuropathic application for feet that a relative told me about. It is a prescription but has to be applied by a medical professional. Do you know about this? If so can someone apply it to my feet? I have to wait 1/2 hour after application to look for side effects. It doesn't have to be a doctor. I am willing to try anything that will be additional help.  Thanks

## 2023-08-07 DIAGNOSIS — G62.9 NEUROPATHY: ICD-10-CM

## 2023-08-07 RX ORDER — PREGABALIN 75 MG/1
CAPSULE ORAL
Qty: 60 CAPSULE | Refills: 0 | Status: SHIPPED | OUTPATIENT
Start: 2023-08-07 | End: 2023-10-06

## 2023-08-29 DIAGNOSIS — G62.9 NEUROPATHY: ICD-10-CM

## 2023-08-29 RX ORDER — PREGABALIN 75 MG/1
CAPSULE ORAL
Qty: 90 CAPSULE | Refills: 0 | Status: SHIPPED | OUTPATIENT
Start: 2023-08-29 | End: 2023-10-28

## 2023-08-30 NOTE — PROGRESS NOTES
Khushi Perera (:  1955) is a 75 y.o. female,Established patient, here for evaluation of the following chief complaint(s):  Medicare AWV (Pt here for her annual wellness, pt is not fasting. ), Diabetes (Pt here to f/u on DM, pt is not fasting. Pt checks BS once weekly, the readings have been WNL. No other complaints. ), and Hypothyroidism (Pt here to f/u on thyroid pt is not fasting. No other complaints. )         ASSESSMENT/PLAN:  1. Medicare annual wellness visit, subsequent  2. Type 2 diabetes mellitus without complication, without long-term current use of insulin (HCC)-continue current dose of metformin and Alcario Angelique but she has been eating more banana popsicles twice a day and there is a concern that her sugars have been more elevated. She is not compliant did not really checking and has a lot of stress we will need to check A1c and probably adjust meds  -     Microalbumin / Creatinine Urine Ratio; Future  -     Hemoglobin A1C; Future  -      DIABETES FOOT EXAM  3. Malignant neoplasm of lower-outer quadrant of right breast of female, estrogen receptor positive (HCC)-continue current dose of Aromasin  4. Essential (primary) hypertension-at goal on losartan  -     Comprehensive Metabolic Panel; Future  -     Lipid Panel; Future  -     CBC; Future  5. Neuropathy-this is being well controlled with Cymbalta and Lyrica tolerating meds  6. Acquired hypothyroidism-continue current dose of levothyroxine  -     T4, Free; Future  -     TSH; Future  7. Gastro-esophageal reflux disease with esophagitis, without bleeding-she is under a lot of stress with tends to have more acid but stable on Protonix  8. Major depressive disorder, recurrent, moderate (HCC)-not well controlled having more signs of depression she currently is taking Lamictal Cymbalta Adderall and trazodone she is meeting with her psychiatrist at the end of this month.   We did discuss about try to get more help for her  but he is refusing to

## 2023-08-31 ENCOUNTER — OFFICE VISIT (OUTPATIENT)
Age: 68
End: 2023-08-31
Payer: COMMERCIAL

## 2023-08-31 VITALS
HEIGHT: 64 IN | OXYGEN SATURATION: 95 % | WEIGHT: 150 LBS | DIASTOLIC BLOOD PRESSURE: 77 MMHG | BODY MASS INDEX: 25.61 KG/M2 | HEART RATE: 87 BPM | RESPIRATION RATE: 16 BRPM | SYSTOLIC BLOOD PRESSURE: 137 MMHG | TEMPERATURE: 98.2 F

## 2023-08-31 DIAGNOSIS — I10 ESSENTIAL (PRIMARY) HYPERTENSION: ICD-10-CM

## 2023-08-31 DIAGNOSIS — F33.1 MAJOR DEPRESSIVE DISORDER, RECURRENT, MODERATE (HCC): ICD-10-CM

## 2023-08-31 DIAGNOSIS — K21.00 GASTRO-ESOPHAGEAL REFLUX DISEASE WITH ESOPHAGITIS, WITHOUT BLEEDING: ICD-10-CM

## 2023-08-31 DIAGNOSIS — E03.9 ACQUIRED HYPOTHYROIDISM: ICD-10-CM

## 2023-08-31 DIAGNOSIS — Z17.0 MALIGNANT NEOPLASM OF LOWER-OUTER QUADRANT OF RIGHT BREAST OF FEMALE, ESTROGEN RECEPTOR POSITIVE (HCC): ICD-10-CM

## 2023-08-31 DIAGNOSIS — Z00.00 MEDICARE ANNUAL WELLNESS VISIT, SUBSEQUENT: Primary | ICD-10-CM

## 2023-08-31 DIAGNOSIS — C50.511 MALIGNANT NEOPLASM OF LOWER-OUTER QUADRANT OF RIGHT BREAST OF FEMALE, ESTROGEN RECEPTOR POSITIVE (HCC): ICD-10-CM

## 2023-08-31 DIAGNOSIS — G62.9 NEUROPATHY: ICD-10-CM

## 2023-08-31 DIAGNOSIS — E11.9 TYPE 2 DIABETES MELLITUS WITHOUT COMPLICATION, WITHOUT LONG-TERM CURRENT USE OF INSULIN (HCC): ICD-10-CM

## 2023-08-31 LAB
ALBUMIN SERPL-MCNC: 4 G/DL (ref 3.5–5)
ALBUMIN/GLOB SERPL: 1.1 (ref 1.1–2.2)
ALP SERPL-CCNC: 101 U/L (ref 45–117)
ALT SERPL-CCNC: 30 U/L (ref 12–78)
ANION GAP SERPL CALC-SCNC: 10 MMOL/L (ref 5–15)
AST SERPL-CCNC: 14 U/L (ref 15–37)
BILIRUB SERPL-MCNC: 0.4 MG/DL (ref 0.2–1)
BUN SERPL-MCNC: 19 MG/DL (ref 6–20)
BUN/CREAT SERPL: 19 (ref 12–20)
CALCIUM SERPL-MCNC: 9.1 MG/DL (ref 8.5–10.1)
CHLORIDE SERPL-SCNC: 99 MMOL/L (ref 97–108)
CHOLEST SERPL-MCNC: 183 MG/DL
CO2 SERPL-SCNC: 25 MMOL/L (ref 21–32)
CREAT SERPL-MCNC: 1.02 MG/DL (ref 0.55–1.02)
EST. AVERAGE GLUCOSE BLD GHB EST-MCNC: 177 MG/DL
GLOBULIN SER CALC-MCNC: 3.6 G/DL (ref 2–4)
GLUCOSE SERPL-MCNC: 277 MG/DL (ref 65–100)
HBA1C MFR BLD: 7.8 % (ref 4–5.6)
HDLC SERPL-MCNC: 44 MG/DL
HDLC SERPL: 4.2 (ref 0–5)
LDLC SERPL CALC-MCNC: 88.6 MG/DL (ref 0–100)
POTASSIUM SERPL-SCNC: 4.4 MMOL/L (ref 3.5–5.1)
PROT SERPL-MCNC: 7.6 G/DL (ref 6.4–8.2)
SODIUM SERPL-SCNC: 134 MMOL/L (ref 136–145)
T4 FREE SERPL-MCNC: 1.6 NG/DL (ref 0.8–1.5)
TRIGL SERPL-MCNC: 252 MG/DL
TSH SERPL DL<=0.05 MIU/L-ACNC: 0.38 UIU/ML (ref 0.36–3.74)
VLDLC SERPL CALC-MCNC: 50.4 MG/DL

## 2023-08-31 PROCEDURE — G0439 PPPS, SUBSEQ VISIT: HCPCS | Performed by: INTERNAL MEDICINE

## 2023-08-31 PROCEDURE — 3078F DIAST BP <80 MM HG: CPT | Performed by: INTERNAL MEDICINE

## 2023-08-31 PROCEDURE — 3052F HG A1C>EQUAL 8.0%<EQUAL 9.0%: CPT | Performed by: INTERNAL MEDICINE

## 2023-08-31 PROCEDURE — 3075F SYST BP GE 130 - 139MM HG: CPT | Performed by: INTERNAL MEDICINE

## 2023-08-31 PROCEDURE — 1123F ACP DISCUSS/DSCN MKR DOCD: CPT | Performed by: INTERNAL MEDICINE

## 2023-08-31 PROCEDURE — 99214 OFFICE O/P EST MOD 30 MIN: CPT | Performed by: INTERNAL MEDICINE

## 2023-08-31 RX ORDER — DEXTROAMPHETAMINE SACCHARATE, AMPHETAMINE ASPARTATE, DEXTROAMPHETAMINE SULFATE AND AMPHETAMINE SULFATE 3.75; 3.75; 3.75; 3.75 MG/1; MG/1; MG/1; MG/1
TABLET ORAL
COMMUNITY
Start: 2023-08-04

## 2023-08-31 SDOH — ECONOMIC STABILITY: HOUSING INSECURITY
IN THE LAST 12 MONTHS, WAS THERE A TIME WHEN YOU DID NOT HAVE A STEADY PLACE TO SLEEP OR SLEPT IN A SHELTER (INCLUDING NOW)?: NO

## 2023-08-31 SDOH — ECONOMIC STABILITY: FOOD INSECURITY: WITHIN THE PAST 12 MONTHS, YOU WORRIED THAT YOUR FOOD WOULD RUN OUT BEFORE YOU GOT MONEY TO BUY MORE.: NEVER TRUE

## 2023-08-31 SDOH — ECONOMIC STABILITY: FOOD INSECURITY: WITHIN THE PAST 12 MONTHS, THE FOOD YOU BOUGHT JUST DIDN'T LAST AND YOU DIDN'T HAVE MONEY TO GET MORE.: NEVER TRUE

## 2023-08-31 SDOH — ECONOMIC STABILITY: INCOME INSECURITY: HOW HARD IS IT FOR YOU TO PAY FOR THE VERY BASICS LIKE FOOD, HOUSING, MEDICAL CARE, AND HEATING?: NOT HARD AT ALL

## 2023-08-31 ASSESSMENT — PATIENT HEALTH QUESTIONNAIRE - PHQ9
3. TROUBLE FALLING OR STAYING ASLEEP: 0
6. FEELING BAD ABOUT YOURSELF - OR THAT YOU ARE A FAILURE OR HAVE LET YOURSELF OR YOUR FAMILY DOWN: 3
10. IF YOU CHECKED OFF ANY PROBLEMS, HOW DIFFICULT HAVE THESE PROBLEMS MADE IT FOR YOU TO DO YOUR WORK, TAKE CARE OF THINGS AT HOME, OR GET ALONG WITH OTHER PEOPLE: 0
8. MOVING OR SPEAKING SO SLOWLY THAT OTHER PEOPLE COULD HAVE NOTICED. OR THE OPPOSITE, BEING SO FIGETY OR RESTLESS THAT YOU HAVE BEEN MOVING AROUND A LOT MORE THAN USUAL: 0
5. POOR APPETITE OR OVEREATING: 3
4. FEELING TIRED OR HAVING LITTLE ENERGY: 3
SUM OF ALL RESPONSES TO PHQ QUESTIONS 1-9: 16
9. THOUGHTS THAT YOU WOULD BE BETTER OFF DEAD, OR OF HURTING YOURSELF: 0
SUM OF ALL RESPONSES TO PHQ QUESTIONS 1-9: 16
2. FEELING DOWN, DEPRESSED OR HOPELESS: 3
1. LITTLE INTEREST OR PLEASURE IN DOING THINGS: 1
SUM OF ALL RESPONSES TO PHQ QUESTIONS 1-9: 16
SUM OF ALL RESPONSES TO PHQ QUESTIONS 1-9: 16
7. TROUBLE CONCENTRATING ON THINGS, SUCH AS READING THE NEWSPAPER OR WATCHING TELEVISION: 3
SUM OF ALL RESPONSES TO PHQ9 QUESTIONS 1 & 2: 4

## 2023-08-31 ASSESSMENT — LIFESTYLE VARIABLES
HOW MANY STANDARD DRINKS CONTAINING ALCOHOL DO YOU HAVE ON A TYPICAL DAY: PATIENT DOES NOT DRINK
HOW OFTEN DO YOU HAVE A DRINK CONTAINING ALCOHOL: NEVER

## 2023-09-01 LAB
ERYTHROCYTE [DISTWIDTH] IN BLOOD BY AUTOMATED COUNT: 13.3 % (ref 11.5–14.5)
HCT VFR BLD AUTO: 40.8 % (ref 35–47)
HGB BLD-MCNC: 13.1 G/DL (ref 11.5–16)
MCH RBC QN AUTO: 28.9 PG (ref 26–34)
MCHC RBC AUTO-ENTMCNC: 32.1 G/DL (ref 30–36.5)
MCV RBC AUTO: 90.1 FL (ref 80–99)
NRBC # BLD: 0 K/UL (ref 0–0.01)
NRBC BLD-RTO: 0 PER 100 WBC
PLATELET # BLD AUTO: 310 K/UL (ref 150–400)
PMV BLD AUTO: 10.4 FL (ref 8.9–12.9)
RBC # BLD AUTO: 4.53 M/UL (ref 3.8–5.2)
WBC # BLD AUTO: 6.9 K/UL (ref 3.6–11)

## 2023-09-09 DIAGNOSIS — E03.9 HYPOTHYROIDISM, UNSPECIFIED: ICD-10-CM

## 2023-09-09 DIAGNOSIS — I10 ESSENTIAL (PRIMARY) HYPERTENSION: ICD-10-CM

## 2023-09-10 RX ORDER — LOSARTAN POTASSIUM 100 MG/1
TABLET ORAL
Qty: 90 TABLET | Refills: 0 | Status: SHIPPED | OUTPATIENT
Start: 2023-09-10

## 2023-09-10 RX ORDER — LEVOTHYROXINE SODIUM 137 UG/1
TABLET ORAL
Qty: 90 TABLET | Refills: 0 | Status: SHIPPED | OUTPATIENT
Start: 2023-09-10

## 2023-09-28 DIAGNOSIS — G62.9 NEUROPATHY: ICD-10-CM

## 2023-09-28 DIAGNOSIS — I10 ESSENTIAL (PRIMARY) HYPERTENSION: ICD-10-CM

## 2023-09-28 DIAGNOSIS — E03.9 HYPOTHYROIDISM, UNSPECIFIED: ICD-10-CM

## 2023-09-28 RX ORDER — LOSARTAN POTASSIUM 100 MG/1
TABLET ORAL
Qty: 90 TABLET | Refills: 1 | Status: SHIPPED | OUTPATIENT
Start: 2023-09-28

## 2023-09-29 RX ORDER — LEVOTHYROXINE SODIUM 137 UG/1
137 TABLET ORAL DAILY
Qty: 90 TABLET | Refills: 0 | Status: SHIPPED | OUTPATIENT
Start: 2023-09-29

## 2023-09-29 RX ORDER — PREGABALIN 75 MG/1
CAPSULE ORAL
Qty: 90 CAPSULE | Refills: 0 | Status: SHIPPED | OUTPATIENT
Start: 2023-09-29 | End: 2023-11-27

## 2023-10-23 ENCOUNTER — APPOINTMENT (OUTPATIENT)
Dept: VASCULAR SURGERY | Facility: HOSPITAL | Age: 68
End: 2023-10-23
Attending: EMERGENCY MEDICINE
Payer: MEDICARE

## 2023-10-23 ENCOUNTER — HOSPITAL ENCOUNTER (EMERGENCY)
Facility: HOSPITAL | Age: 68
Discharge: HOME | End: 2023-10-23
Attending: EMERGENCY MEDICINE
Payer: MEDICARE

## 2023-10-23 VITALS
OXYGEN SATURATION: 95 % | TEMPERATURE: 98.4 F | HEIGHT: 64 IN | RESPIRATION RATE: 16 BRPM | SYSTOLIC BLOOD PRESSURE: 150 MMHG | DIASTOLIC BLOOD PRESSURE: 78 MMHG | BODY MASS INDEX: 26.23 KG/M2 | WEIGHT: 153.66 LBS | HEART RATE: 81 BPM

## 2023-10-23 DIAGNOSIS — S40.021A ARM BRUISE, RIGHT, INITIAL ENCOUNTER: ICD-10-CM

## 2023-10-23 DIAGNOSIS — M79.601 RIGHT ARM PAIN: Primary | ICD-10-CM

## 2023-10-23 PROCEDURE — 93971 EXTREMITY STUDY: CPT

## 2023-10-23 PROCEDURE — 99282 EMERGENCY DEPT VISIT SF MDM: CPT

## 2023-10-23 RX ORDER — IBUPROFEN 600 MG/1
600 TABLET ORAL
Status: DISCONTINUED | OUTPATIENT
Start: 2023-10-23 | End: 2023-10-24 | Stop reason: HOSPADM

## 2023-10-23 ASSESSMENT — ENCOUNTER SYMPTOMS
COUGH: 0
SORE THROAT: 0
VOMITING: 0

## 2023-10-23 ASSESSMENT — PAIN - FUNCTIONAL ASSESSMENT: PAIN_FUNCTIONAL_ASSESSMENT: 0-10

## 2023-10-23 ASSESSMENT — PAIN SCALES - GENERAL: PAINLEVEL_OUTOF10: 3

## 2023-10-25 LAB — ECHO BSA: 1.77 M2

## 2023-10-26 DIAGNOSIS — K21.00 GASTRO-ESOPHAGEAL REFLUX DISEASE WITH ESOPHAGITIS, WITHOUT BLEEDING: ICD-10-CM

## 2023-10-26 RX ORDER — PANTOPRAZOLE SODIUM 40 MG/1
TABLET, DELAYED RELEASE ORAL
Qty: 90 TABLET | Refills: 1 | Status: SHIPPED | OUTPATIENT
Start: 2023-10-26

## 2023-10-30 DIAGNOSIS — G62.9 NEUROPATHY: ICD-10-CM

## 2023-10-30 RX ORDER — METFORMIN HYDROCHLORIDE 500 MG/1
TABLET, EXTENDED RELEASE ORAL
Qty: 270 TABLET | Refills: 1 | Status: SHIPPED | OUTPATIENT
Start: 2023-10-30

## 2023-10-30 RX ORDER — PREGABALIN 75 MG/1
CAPSULE ORAL
Qty: 90 CAPSULE | Refills: 1 | Status: SHIPPED | OUTPATIENT
Start: 2023-10-30 | End: 2024-04-27

## 2023-11-10 NOTE — TELEPHONE ENCOUNTER
----- Message from [address] [place] sent at 11/25/2020 10:03 AM EST -----  Regarding: Dr. Kamla Love first and last name: Pt      Reason for call: [employer]/Provider Call      Callback required yes/no and why: Yes      Best contact number(s): [phone]      Details to clarify the request: Pt requesting assistance to access [employer] account. Pt also requesting to speak to Nurse or Provider.       Gigi Bergman none

## 2023-11-20 RX ORDER — ALBUTEROL SULFATE 90 UG/1
2 AEROSOL, METERED RESPIRATORY (INHALATION) EVERY 4 HOURS PRN
Qty: 18 G | Refills: 3 | Status: SHIPPED | OUTPATIENT
Start: 2023-11-20

## 2023-11-28 DIAGNOSIS — G62.9 NEUROPATHY: ICD-10-CM

## 2023-11-28 RX ORDER — DAPAGLIFLOZIN 10 MG/1
10 TABLET, FILM COATED ORAL DAILY
Qty: 90 TABLET | Refills: 1 | Status: SHIPPED | OUTPATIENT
Start: 2023-11-28

## 2023-11-28 RX ORDER — PREGABALIN 75 MG/1
CAPSULE ORAL
Qty: 90 CAPSULE | Refills: 1 | Status: SHIPPED | OUTPATIENT
Start: 2023-11-28 | End: 2023-11-29 | Stop reason: SDUPTHER

## 2023-11-28 RX ORDER — ALBUTEROL SULFATE 90 UG/1
2 AEROSOL, METERED RESPIRATORY (INHALATION) EVERY 4 HOURS PRN
Qty: 18 G | Refills: 3 | Status: SHIPPED | OUTPATIENT
Start: 2023-11-28 | End: 2023-11-29 | Stop reason: SDUPTHER

## 2023-11-29 RX ORDER — ALBUTEROL SULFATE 90 UG/1
2 AEROSOL, METERED RESPIRATORY (INHALATION) EVERY 4 HOURS PRN
Qty: 18 G | Refills: 3 | Status: SHIPPED | OUTPATIENT
Start: 2023-11-29

## 2023-11-29 RX ORDER — PREGABALIN 75 MG/1
CAPSULE ORAL
Qty: 90 CAPSULE | Refills: 1 | Status: SHIPPED | OUTPATIENT
Start: 2023-11-29 | End: 2024-05-27

## 2024-01-20 DIAGNOSIS — G62.9 POLYNEUROPATHY, UNSPECIFIED: ICD-10-CM

## 2024-01-24 RX ORDER — DULOXETIN HYDROCHLORIDE 60 MG/1
60 CAPSULE, DELAYED RELEASE ORAL DAILY
Qty: 90 CAPSULE | Refills: 0 | OUTPATIENT
Start: 2024-01-24

## 2024-02-02 DIAGNOSIS — G62.9 POLYNEUROPATHY, UNSPECIFIED: ICD-10-CM

## 2024-02-02 RX ORDER — DULOXETIN HYDROCHLORIDE 60 MG/1
60 CAPSULE, DELAYED RELEASE ORAL DAILY
Qty: 90 CAPSULE | Refills: 0 | OUTPATIENT
Start: 2024-02-02

## 2024-02-06 DIAGNOSIS — G62.9 POLYNEUROPATHY, UNSPECIFIED: ICD-10-CM

## 2024-02-06 RX ORDER — DULOXETIN HYDROCHLORIDE 60 MG/1
60 CAPSULE, DELAYED RELEASE ORAL DAILY
Qty: 90 CAPSULE | Refills: 0 | Status: CANCELLED | OUTPATIENT
Start: 2024-02-06

## 2024-02-07 RX ORDER — DULOXETIN HYDROCHLORIDE 60 MG/1
60 CAPSULE, DELAYED RELEASE ORAL DAILY
Qty: 90 CAPSULE | Refills: 0 | OUTPATIENT
Start: 2024-02-07

## 2024-02-07 RX ORDER — DULOXETIN HYDROCHLORIDE 60 MG/1
60 CAPSULE, DELAYED RELEASE ORAL DAILY
Qty: 30 CAPSULE | Refills: 0 | Status: SHIPPED | OUTPATIENT
Start: 2024-02-07

## 2024-03-03 DIAGNOSIS — G62.9 NEUROPATHY: ICD-10-CM

## 2024-03-04 DIAGNOSIS — G62.9 POLYNEUROPATHY, UNSPECIFIED: ICD-10-CM

## 2024-03-04 RX ORDER — DULOXETIN HYDROCHLORIDE 60 MG/1
60 CAPSULE, DELAYED RELEASE ORAL DAILY
Qty: 30 CAPSULE | Refills: 0 | Status: SHIPPED | OUTPATIENT
Start: 2024-03-04

## 2024-03-04 RX ORDER — PREGABALIN 75 MG/1
CAPSULE ORAL
Qty: 90 CAPSULE | OUTPATIENT
Start: 2024-03-04

## 2024-03-07 DIAGNOSIS — K21.00 GASTRO-ESOPHAGEAL REFLUX DISEASE WITH ESOPHAGITIS, WITHOUT BLEEDING: ICD-10-CM

## 2024-03-07 DIAGNOSIS — G62.9 NEUROPATHY: ICD-10-CM

## 2024-03-08 RX ORDER — PREGABALIN 75 MG/1
CAPSULE ORAL
Qty: 90 CAPSULE | Refills: 1 | OUTPATIENT
Start: 2024-03-08 | End: 2024-09-03

## 2024-03-08 RX ORDER — PANTOPRAZOLE SODIUM 40 MG/1
40 TABLET, DELAYED RELEASE ORAL DAILY
Qty: 90 TABLET | Refills: 1 | OUTPATIENT
Start: 2024-03-08

## 2024-03-13 DIAGNOSIS — K21.00 GASTRO-ESOPHAGEAL REFLUX DISEASE WITH ESOPHAGITIS, WITHOUT BLEEDING: ICD-10-CM

## 2024-03-13 DIAGNOSIS — G62.9 NEUROPATHY: ICD-10-CM

## 2024-03-14 DIAGNOSIS — G62.9 NEUROPATHY: ICD-10-CM

## 2024-03-14 DIAGNOSIS — K21.00 GASTRO-ESOPHAGEAL REFLUX DISEASE WITH ESOPHAGITIS, WITHOUT BLEEDING: ICD-10-CM

## 2024-03-14 RX ORDER — PANTOPRAZOLE SODIUM 40 MG/1
40 TABLET, DELAYED RELEASE ORAL DAILY
Qty: 30 TABLET | Refills: 0 | Status: SHIPPED | OUTPATIENT
Start: 2024-03-14

## 2024-03-14 RX ORDER — PREGABALIN 75 MG/1
CAPSULE ORAL
Qty: 90 CAPSULE | Refills: 0 | Status: SHIPPED | OUTPATIENT
Start: 2024-03-14 | End: 2024-03-14 | Stop reason: SDUPTHER

## 2024-03-14 RX ORDER — PREGABALIN 75 MG/1
CAPSULE ORAL
Qty: 90 CAPSULE | Refills: 0 | Status: SHIPPED | OUTPATIENT
Start: 2024-03-14 | End: 2024-09-09

## 2024-03-14 RX ORDER — PANTOPRAZOLE SODIUM 40 MG/1
40 TABLET, DELAYED RELEASE ORAL DAILY
Qty: 30 TABLET | Refills: 0 | Status: SHIPPED | OUTPATIENT
Start: 2024-03-14 | End: 2024-03-14 | Stop reason: SDUPTHER

## 2024-03-21 DIAGNOSIS — G62.9 NEUROPATHY: ICD-10-CM

## 2024-03-21 RX ORDER — PREGABALIN 75 MG/1
CAPSULE ORAL
Qty: 90 CAPSULE | Refills: 0 | Status: SHIPPED | OUTPATIENT
Start: 2024-04-10 | End: 2024-05-09

## 2024-03-31 DIAGNOSIS — G62.9 POLYNEUROPATHY, UNSPECIFIED: ICD-10-CM

## 2024-04-01 RX ORDER — DULOXETIN HYDROCHLORIDE 60 MG/1
60 CAPSULE, DELAYED RELEASE ORAL DAILY
Qty: 30 CAPSULE | Refills: 0 | Status: SHIPPED | OUTPATIENT
Start: 2024-04-01

## 2024-04-08 DIAGNOSIS — E03.9 HYPOTHYROIDISM, UNSPECIFIED: ICD-10-CM

## 2024-04-08 RX ORDER — LEVOTHYROXINE SODIUM 137 UG/1
137 TABLET ORAL DAILY
Qty: 90 TABLET | Refills: 1 | Status: SHIPPED | OUTPATIENT
Start: 2024-04-08

## 2024-04-23 ENCOUNTER — OFFICE VISIT (OUTPATIENT)
Age: 69
End: 2024-04-23
Payer: MEDICARE

## 2024-04-23 VITALS
HEIGHT: 64 IN | WEIGHT: 147.4 LBS | TEMPERATURE: 97.7 F | SYSTOLIC BLOOD PRESSURE: 132 MMHG | RESPIRATION RATE: 17 BRPM | HEART RATE: 78 BPM | DIASTOLIC BLOOD PRESSURE: 77 MMHG | OXYGEN SATURATION: 95 % | BODY MASS INDEX: 25.16 KG/M2

## 2024-04-23 DIAGNOSIS — I10 ESSENTIAL HYPERTENSION, BENIGN: ICD-10-CM

## 2024-04-23 DIAGNOSIS — E03.9 ACQUIRED HYPOTHYROIDISM: ICD-10-CM

## 2024-04-23 DIAGNOSIS — E11.9 TYPE 2 DIABETES MELLITUS WITHOUT COMPLICATION, WITHOUT LONG-TERM CURRENT USE OF INSULIN (HCC): Primary | ICD-10-CM

## 2024-04-23 DIAGNOSIS — F33.1 MAJOR DEPRESSIVE DISORDER, RECURRENT, MODERATE (HCC): ICD-10-CM

## 2024-04-23 DIAGNOSIS — Z12.31 VISIT FOR SCREENING MAMMOGRAM: ICD-10-CM

## 2024-04-23 DIAGNOSIS — G62.9 NEUROPATHY: ICD-10-CM

## 2024-04-23 DIAGNOSIS — C50.511 MALIGNANT NEOPLASM OF LOWER-OUTER QUADRANT OF RIGHT BREAST OF FEMALE, ESTROGEN RECEPTOR POSITIVE (HCC): ICD-10-CM

## 2024-04-23 DIAGNOSIS — E11.9 TYPE 2 DIABETES MELLITUS WITHOUT COMPLICATION, WITHOUT LONG-TERM CURRENT USE OF INSULIN (HCC): ICD-10-CM

## 2024-04-23 DIAGNOSIS — K21.00 GASTRO-ESOPHAGEAL REFLUX DISEASE WITH ESOPHAGITIS, WITHOUT BLEEDING: ICD-10-CM

## 2024-04-23 DIAGNOSIS — Z17.0 MALIGNANT NEOPLASM OF LOWER-OUTER QUADRANT OF RIGHT BREAST OF FEMALE, ESTROGEN RECEPTOR POSITIVE (HCC): ICD-10-CM

## 2024-04-23 PROCEDURE — 3078F DIAST BP <80 MM HG: CPT | Performed by: INTERNAL MEDICINE

## 2024-04-23 PROCEDURE — 1123F ACP DISCUSS/DSCN MKR DOCD: CPT | Performed by: INTERNAL MEDICINE

## 2024-04-23 PROCEDURE — 3046F HEMOGLOBIN A1C LEVEL >9.0%: CPT | Performed by: INTERNAL MEDICINE

## 2024-04-23 PROCEDURE — 1090F PRES/ABSN URINE INCON ASSESS: CPT | Performed by: INTERNAL MEDICINE

## 2024-04-23 PROCEDURE — 3075F SYST BP GE 130 - 139MM HG: CPT | Performed by: INTERNAL MEDICINE

## 2024-04-23 PROCEDURE — 2022F DILAT RTA XM EVC RTNOPTHY: CPT | Performed by: INTERNAL MEDICINE

## 2024-04-23 PROCEDURE — 1036F TOBACCO NON-USER: CPT | Performed by: INTERNAL MEDICINE

## 2024-04-23 PROCEDURE — 3017F COLORECTAL CA SCREEN DOC REV: CPT | Performed by: INTERNAL MEDICINE

## 2024-04-23 PROCEDURE — G8419 CALC BMI OUT NRM PARAM NOF/U: HCPCS | Performed by: INTERNAL MEDICINE

## 2024-04-23 PROCEDURE — 99214 OFFICE O/P EST MOD 30 MIN: CPT | Performed by: INTERNAL MEDICINE

## 2024-04-23 PROCEDURE — G8399 PT W/DXA RESULTS DOCUMENT: HCPCS | Performed by: INTERNAL MEDICINE

## 2024-04-23 PROCEDURE — G8427 DOCREV CUR MEDS BY ELIG CLIN: HCPCS | Performed by: INTERNAL MEDICINE

## 2024-04-23 RX ORDER — CLONAZEPAM 0.5 MG/1
0.5 TABLET ORAL NIGHTLY PRN
COMMUNITY

## 2024-04-23 RX ORDER — CLONIDINE HYDROCHLORIDE 0.1 MG/1
0.1 TABLET ORAL EVERY MORNING
COMMUNITY

## 2024-04-23 RX ORDER — DEXTROAMPHETAMINE SACCHARATE, AMPHETAMINE ASPARTATE, DEXTROAMPHETAMINE SULFATE AND AMPHETAMINE SULFATE 5; 5; 5; 5 MG/1; MG/1; MG/1; MG/1
20 TABLET ORAL 2 TIMES DAILY
COMMUNITY
Start: 2024-04-16

## 2024-04-23 ASSESSMENT — PATIENT HEALTH QUESTIONNAIRE - PHQ9
9. THOUGHTS THAT YOU WOULD BE BETTER OFF DEAD, OR OF HURTING YOURSELF: NOT AT ALL
3. TROUBLE FALLING OR STAYING ASLEEP: NOT AT ALL
SUM OF ALL RESPONSES TO PHQ QUESTIONS 1-9: 7
4. FEELING TIRED OR HAVING LITTLE ENERGY: NOT AT ALL
SUM OF ALL RESPONSES TO PHQ QUESTIONS 1-9: 7
6. FEELING BAD ABOUT YOURSELF - OR THAT YOU ARE A FAILURE OR HAVE LET YOURSELF OR YOUR FAMILY DOWN: NOT AT ALL
2. FEELING DOWN, DEPRESSED OR HOPELESS: SEVERAL DAYS
7. TROUBLE CONCENTRATING ON THINGS, SUCH AS READING THE NEWSPAPER OR WATCHING TELEVISION: NEARLY EVERY DAY
SUM OF ALL RESPONSES TO PHQ QUESTIONS 1-9: 7
1. LITTLE INTEREST OR PLEASURE IN DOING THINGS: NOT AT ALL
10. IF YOU CHECKED OFF ANY PROBLEMS, HOW DIFFICULT HAVE THESE PROBLEMS MADE IT FOR YOU TO DO YOUR WORK, TAKE CARE OF THINGS AT HOME, OR GET ALONG WITH OTHER PEOPLE: SOMEWHAT DIFFICULT
8. MOVING OR SPEAKING SO SLOWLY THAT OTHER PEOPLE COULD HAVE NOTICED. OR THE OPPOSITE, BEING SO FIGETY OR RESTLESS THAT YOU HAVE BEEN MOVING AROUND A LOT MORE THAN USUAL: NOT AT ALL
5. POOR APPETITE OR OVEREATING: NEARLY EVERY DAY
SUM OF ALL RESPONSES TO PHQ9 QUESTIONS 1 & 2: 1
SUM OF ALL RESPONSES TO PHQ QUESTIONS 1-9: 7

## 2024-04-23 NOTE — PROGRESS NOTES
Identified pt with two pt identifiers(name and ). Reviewed record in preparation for visit and have obtained necessary documentation. All patient medications has been reviewed.  Chief Complaint   Patient presents with    Diabetes     Follow up             Health Maintenance Due   Topic    Hepatitis C screen     Respiratory Syncytial Virus (RSV) Pregnant or age 60 yrs+ (1 - 1-dose 60+ series)    COVID-19 Vaccine (3 - 2023-24 season)    Breast cancer screen     Diabetic retinal exam     Diabetic Alb to Cr ratio (uACR) test      Health Maintenance Review: Patient reminded of \"due or due soon\" health maintenance. I have asked the patient to contact his/her primary care provider (PCP) for follow-up on his/her health maintenance.        Wt Readings from Last 3 Encounters:   24 66.9 kg (147 lb 6.4 oz)   10/23/23 69.7 kg (153 lb 10.6 oz)   23 68 kg (150 lb)     Temp Readings from Last 3 Encounters:   24 97.7 °F (36.5 °C) (Temporal)   10/23/23 98.4 °F (36.9 °C) (Oral)   23 98.2 °F (36.8 °C) (Oral)     BP Readings from Last 3 Encounters:   24 132/77   10/23/23 (!) 150/78   23 137/77     Pulse Readings from Last 3 Encounters:   24 78   10/23/23 81   23 87       1. \"Have you been to the ER, urgent care clinic since your last visit?  Hospitalized since your last visit?\"  [facility].     2. \"Have you seen or consulted any other health care providers outside of the [facility] since your last visit?\" No     3. For patients aged 45-75: Has the patient had a colonoscopy / FIT/ Cologuard? Yes - Care Gap present. Most recent result on file      If the patient is female:    4. For patients aged 40-74: Has the patient had a mammogram within the past 2 years? Yes - no Care Gap present      5. For patients aged 21-65: Has the patient had a pap smear? No      
patient's reflux is well-managed. The patient will continue her Protonix regimen.    7. Mood Disorder.  The patient's mood will continue to be managed with Adderall, Lamictal, clonidine in the morning, trazodone at night, Cymbalta, and clonazepam as needed.    Follow-up  The patient is scheduled for a follow-up visit in 6 months.      Subjective   HPI  History of Present Illness  The patient presents for evaluation of multiple medical concerns.    The patient believes her diabetes is well-managed, primarily through blood tests. She acknowledges a slight consumption of chocolate during the Priyanka period and has largely reduced her carbohydrate intake. Prior to her 's transition to an assisted living facility, she has been unable to engage in physical exercise but now that he has been there for 2 weeks she feels as if she can incorporate it more    The patient is currently on a regimen of Aromasin for her breast cancer and is under the care of Dr. Blevins and Dr. Rodgers. It has been a significant duration since her last mammogram due to her inability to leave the house.    The patient's hypertension is well-managed, as evidenced by her blood pressure reading of 132/77. She is tolerating her losartan medication well.    The patient is currently on Cymbalta and Lyrica for her neuropathy. She reports experiencing numbness in the morning, but notes that walking alleviates this symptom.    The patient is tolerating levothyroxine well, with no symptoms of diarrhea, constipation, or dry skin.  Lab Results   Component Value Date    TSH 0.47 03/22/2023    CUU3BLV 0.38 08/31/2023         The patient's reflux is well-managed with Protonix. She reports a healthy appetite and avoids spicy foods. She denies the presence of blood or dark-colored stool.    The patient is under the care of a psychiatrist, who prescribed Adderall, Lamictal, trazodone, and Cymbalta. She also takes clonidine in the morning to aid sleep, and

## 2024-04-24 LAB
ALBUMIN SERPL-MCNC: 4.3 G/DL (ref 3.5–5)
ALBUMIN/GLOB SERPL: 1.3 (ref 1.1–2.2)
ALP SERPL-CCNC: 101 U/L (ref 45–117)
ALT SERPL-CCNC: 22 U/L (ref 12–78)
ANION GAP SERPL CALC-SCNC: 7 MMOL/L (ref 5–15)
AST SERPL-CCNC: 18 U/L (ref 15–37)
BILIRUB SERPL-MCNC: 0.4 MG/DL (ref 0.2–1)
BUN SERPL-MCNC: 20 MG/DL (ref 6–20)
BUN/CREAT SERPL: 20 (ref 12–20)
CALCIUM SERPL-MCNC: 10.3 MG/DL (ref 8.5–10.1)
CHLORIDE SERPL-SCNC: 101 MMOL/L (ref 97–108)
CHOLEST SERPL-MCNC: 198 MG/DL
CO2 SERPL-SCNC: 27 MMOL/L (ref 21–32)
CREAT SERPL-MCNC: 0.98 MG/DL (ref 0.55–1.02)
CREAT UR-MCNC: 88.8 MG/DL
ERYTHROCYTE [DISTWIDTH] IN BLOOD BY AUTOMATED COUNT: 13.3 % (ref 11.5–14.5)
EST. AVERAGE GLUCOSE BLD GHB EST-MCNC: 143 MG/DL
GLOBULIN SER CALC-MCNC: 3.2 G/DL (ref 2–4)
GLUCOSE SERPL-MCNC: 124 MG/DL (ref 65–100)
HBA1C MFR BLD: 6.6 % (ref 4–5.6)
HCT VFR BLD AUTO: 42.8 % (ref 35–47)
HDLC SERPL-MCNC: 50 MG/DL
HDLC SERPL: 4 (ref 0–5)
HGB BLD-MCNC: 13.5 G/DL (ref 11.5–16)
LDLC SERPL CALC-MCNC: 106.2 MG/DL (ref 0–100)
MCH RBC QN AUTO: 28.8 PG (ref 26–34)
MCHC RBC AUTO-ENTMCNC: 31.5 G/DL (ref 30–36.5)
MCV RBC AUTO: 91.5 FL (ref 80–99)
MICROALBUMIN UR-MCNC: 1.27 MG/DL
MICROALBUMIN/CREAT UR-RTO: 14 MG/G (ref 0–30)
NRBC # BLD: 0 K/UL (ref 0–0.01)
NRBC BLD-RTO: 0 PER 100 WBC
PLATELET # BLD AUTO: 372 K/UL (ref 150–400)
PMV BLD AUTO: 10 FL (ref 8.9–12.9)
POTASSIUM SERPL-SCNC: 4.9 MMOL/L (ref 3.5–5.1)
PROT SERPL-MCNC: 7.5 G/DL (ref 6.4–8.2)
RBC # BLD AUTO: 4.68 M/UL (ref 3.8–5.2)
SODIUM SERPL-SCNC: 135 MMOL/L (ref 136–145)
SPECIMEN HOLD: NORMAL
T4 FREE SERPL-MCNC: 1 NG/DL (ref 0.8–1.5)
TRIGL SERPL-MCNC: 209 MG/DL
TSH SERPL DL<=0.05 MIU/L-ACNC: 3.17 UIU/ML (ref 0.36–3.74)
VLDLC SERPL CALC-MCNC: 41.8 MG/DL
WBC # BLD AUTO: 9 K/UL (ref 3.6–11)

## 2024-04-24 RX ORDER — ROSUVASTATIN CALCIUM 5 MG/1
5 TABLET, COATED ORAL DAILY
Qty: 90 TABLET | Refills: 1 | Status: SHIPPED | OUTPATIENT
Start: 2024-04-24

## 2024-04-24 RX ORDER — METFORMIN HYDROCHLORIDE 500 MG/1
TABLET, EXTENDED RELEASE ORAL
Qty: 270 TABLET | Refills: 1 | Status: SHIPPED | OUTPATIENT
Start: 2024-04-24

## 2024-04-29 RX ORDER — EXEMESTANE 25 MG/1
25 TABLET ORAL DAILY
Qty: 90 TABLET | OUTPATIENT
Start: 2024-04-29

## 2024-05-02 DIAGNOSIS — E03.9 HYPOTHYROIDISM, UNSPECIFIED: ICD-10-CM

## 2024-05-03 RX ORDER — LEVOTHYROXINE SODIUM 137 UG/1
137 TABLET ORAL DAILY
Qty: 90 TABLET | Refills: 1 | Status: SHIPPED | OUTPATIENT
Start: 2024-05-03

## 2024-05-04 DIAGNOSIS — G62.9 POLYNEUROPATHY, UNSPECIFIED: ICD-10-CM

## 2024-05-06 DIAGNOSIS — K21.00 GASTRO-ESOPHAGEAL REFLUX DISEASE WITH ESOPHAGITIS, WITHOUT BLEEDING: ICD-10-CM

## 2024-05-06 RX ORDER — PANTOPRAZOLE SODIUM 40 MG/1
40 TABLET, DELAYED RELEASE ORAL DAILY
Qty: 90 TABLET | Refills: 1 | Status: SHIPPED | OUTPATIENT
Start: 2024-05-06

## 2024-05-06 RX ORDER — DULOXETIN HYDROCHLORIDE 60 MG/1
60 CAPSULE, DELAYED RELEASE ORAL DAILY
Qty: 90 CAPSULE | Refills: 1 | Status: SHIPPED | OUTPATIENT
Start: 2024-05-06

## 2024-05-30 RX ORDER — DAPAGLIFLOZIN 10 MG/1
10 TABLET, FILM COATED ORAL DAILY
Qty: 90 TABLET | Refills: 1 | Status: SHIPPED | OUTPATIENT
Start: 2024-05-30

## 2024-05-31 ENCOUNTER — TELEPHONE (OUTPATIENT)
Age: 69
End: 2024-05-31

## 2024-05-31 DIAGNOSIS — C50.511 MALIGNANT NEOPLASM OF LOWER-OUTER QUADRANT OF RIGHT BREAST OF FEMALE, ESTROGEN RECEPTOR POSITIVE (HCC): Primary | ICD-10-CM

## 2024-05-31 DIAGNOSIS — Z17.0 MALIGNANT NEOPLASM OF LOWER-OUTER QUADRANT OF RIGHT BREAST OF FEMALE, ESTROGEN RECEPTOR POSITIVE (HCC): Primary | ICD-10-CM

## 2024-05-31 RX ORDER — EXEMESTANE 25 MG/1
25 TABLET ORAL DAILY
Qty: 30 TABLET | Refills: 0 | Status: SHIPPED | OUTPATIENT
Start: 2024-05-31 | End: 2024-06-30

## 2024-05-31 NOTE — TELEPHONE ENCOUNTER
[facility]  [phone]    05/31/24 12:18 PM EDT - Called and left a message letting the patient know that we sent in a month's refill.

## 2024-05-31 NOTE — TELEPHONE ENCOUNTER
Patient returned call and got scheduled for an appointment on 6/25 at 1:45. Patient wanted to know if her exemestane can be called in to help her get to her appointment.       #[phone]

## 2024-06-05 DIAGNOSIS — G62.9 NEUROPATHY: ICD-10-CM

## 2024-06-05 RX ORDER — PREGABALIN 75 MG/1
CAPSULE ORAL
Qty: 90 CAPSULE | Refills: 0 | Status: SHIPPED | OUTPATIENT
Start: 2024-06-05 | End: 2024-07-05

## 2024-06-10 DIAGNOSIS — C50.511 MALIGNANT NEOPLASM OF LOWER-OUTER QUADRANT OF RIGHT BREAST OF FEMALE, ESTROGEN RECEPTOR POSITIVE (HCC): Primary | ICD-10-CM

## 2024-06-10 DIAGNOSIS — Z17.0 MALIGNANT NEOPLASM OF LOWER-OUTER QUADRANT OF RIGHT BREAST OF FEMALE, ESTROGEN RECEPTOR POSITIVE (HCC): Primary | ICD-10-CM

## 2024-06-25 ENCOUNTER — OFFICE VISIT (OUTPATIENT)
Age: 69
End: 2024-06-25
Payer: MEDICARE

## 2024-06-25 VITALS
RESPIRATION RATE: 18 BRPM | WEIGHT: 152 LBS | HEART RATE: 80 BPM | SYSTOLIC BLOOD PRESSURE: 131 MMHG | HEIGHT: 64 IN | TEMPERATURE: 97.3 F | DIASTOLIC BLOOD PRESSURE: 67 MMHG | OXYGEN SATURATION: 94 % | BODY MASS INDEX: 25.95 KG/M2

## 2024-06-25 DIAGNOSIS — E11.9 TYPE 2 DIABETES MELLITUS WITHOUT COMPLICATION, WITHOUT LONG-TERM CURRENT USE OF INSULIN (HCC): ICD-10-CM

## 2024-06-25 DIAGNOSIS — Z78.0 POSTMENOPAUSAL: ICD-10-CM

## 2024-06-25 DIAGNOSIS — C50.511 MALIGNANT NEOPLASM OF LOWER-OUTER QUADRANT OF RIGHT BREAST OF FEMALE, ESTROGEN RECEPTOR POSITIVE (HCC): Primary | ICD-10-CM

## 2024-06-25 DIAGNOSIS — Z17.0 MALIGNANT NEOPLASM OF LOWER-OUTER QUADRANT OF RIGHT BREAST OF FEMALE, ESTROGEN RECEPTOR POSITIVE (HCC): Primary | ICD-10-CM

## 2024-06-25 PROBLEM — E11.22 TYPE 2 DIABETES MELLITUS WITH CHRONIC KIDNEY DISEASE (HCC): Status: ACTIVE | Noted: 2024-06-25

## 2024-06-25 PROCEDURE — 99214 OFFICE O/P EST MOD 30 MIN: CPT | Performed by: INTERNAL MEDICINE

## 2024-06-25 PROCEDURE — 3078F DIAST BP <80 MM HG: CPT | Performed by: INTERNAL MEDICINE

## 2024-06-25 PROCEDURE — G2211 COMPLEX E/M VISIT ADD ON: HCPCS | Performed by: INTERNAL MEDICINE

## 2024-06-25 PROCEDURE — 1090F PRES/ABSN URINE INCON ASSESS: CPT | Performed by: INTERNAL MEDICINE

## 2024-06-25 PROCEDURE — 3044F HG A1C LEVEL LT 7.0%: CPT | Performed by: INTERNAL MEDICINE

## 2024-06-25 PROCEDURE — 1123F ACP DISCUSS/DSCN MKR DOCD: CPT | Performed by: INTERNAL MEDICINE

## 2024-06-25 PROCEDURE — G8419 CALC BMI OUT NRM PARAM NOF/U: HCPCS | Performed by: INTERNAL MEDICINE

## 2024-06-25 PROCEDURE — 3075F SYST BP GE 130 - 139MM HG: CPT | Performed by: INTERNAL MEDICINE

## 2024-06-25 PROCEDURE — G8427 DOCREV CUR MEDS BY ELIG CLIN: HCPCS | Performed by: INTERNAL MEDICINE

## 2024-06-25 PROCEDURE — 1036F TOBACCO NON-USER: CPT | Performed by: INTERNAL MEDICINE

## 2024-06-25 PROCEDURE — 3017F COLORECTAL CA SCREEN DOC REV: CPT | Performed by: INTERNAL MEDICINE

## 2024-06-25 PROCEDURE — G8399 PT W/DXA RESULTS DOCUMENT: HCPCS | Performed by: INTERNAL MEDICINE

## 2024-06-25 PROCEDURE — 2022F DILAT RTA XM EVC RTNOPTHY: CPT | Performed by: INTERNAL MEDICINE

## 2024-06-25 RX ORDER — EXEMESTANE 25 MG/1
25 TABLET ORAL DAILY
Qty: 90 TABLET | Refills: 3 | Status: SHIPPED | OUTPATIENT
Start: 2024-06-25 | End: 2025-06-20

## 2024-06-25 NOTE — PROGRESS NOTES
[facility] [place] at [facility]   [address], [address] [facility] [address]   W: [phone]  F: [phone]             Reason for Visit:     Corinne Miles is a 68 y.o. female who is seen in follow up for breast cancer.      Rad onc:  Dr. Box   Breast Surgeon: Dr. Lee          Treatment History:      1/25/2012 left breast lumpectomy:  IMC with mixed ductal and lobular features, 2.3 cm,  Gr 2, 0/3 LN:  pT2 pN0 cM0;  ER + at 100%, DE +  at 60%, HER 2 negative at FISH ratio 1.6, sig/cell 3.75, oncotype Dx = 15; s/p lumpectomy, XRT (Dr Box), anastrozole for 5 years (Dr. Polanco)    8/23/21 right breast mass core bx:  ILC, gr 2, 9 mm, ER + at 95%, DE negative, HER 2 negative at IHC 0, ki67 15%, LCIS focal, no LVI    10/12/21 right breast lumpectomy: ILC, 1.4 cm, gr 2,  0/3 LN, no LVI, pT1c pN0 cM0    mammaprint shows high risk luminal B    XRT completed 2/14/22    Exemestane 4/2022 - current  [employer] genetic testing negative          History of Present Illness:     An abnormal mammogram led to the pathology above      Interval Hx: Patient presents today for follow up on exemestane. Reports gr 1 anxiety, 2/10 shoulder pain      FH:  Mother with breast cancer at age 75 and 86; no ovarian, prostate, pancreas cancer   Review of systems was obtained and pertinent findings reviewed above. Past medical history, social history, family history, medications, and allergies are located in the electronic medical record.          Physical Exam:        Visit Vitals     Vitals:    06/25/24 1402   BP: 131/67   Pulse: 80   Resp: 18   Temp: 97.3 °F (36.3 °C)   SpO2: 94%        ECOG PS: 0      Constitutional: Appears well-developed and well-nourished in no apparent distress        Mental status: Alert and awake, Oriented to person/place/time, Able to follow commands      Eyes: EOM normal, Sclera normal, No visible ocular discharge      HENT: Normocephalic, atraumatic      Mouth/Throat: Moist

## 2024-06-30 DIAGNOSIS — I10 ESSENTIAL (PRIMARY) HYPERTENSION: ICD-10-CM

## 2024-07-01 DIAGNOSIS — Z17.0 MALIGNANT NEOPLASM OF LOWER-OUTER QUADRANT OF RIGHT BREAST OF FEMALE, ESTROGEN RECEPTOR POSITIVE (HCC): ICD-10-CM

## 2024-07-01 DIAGNOSIS — C50.511 MALIGNANT NEOPLASM OF LOWER-OUTER QUADRANT OF RIGHT BREAST OF FEMALE, ESTROGEN RECEPTOR POSITIVE (HCC): ICD-10-CM

## 2024-07-01 RX ORDER — EXEMESTANE 25 MG/1
25 TABLET ORAL DAILY
Qty: 30 TABLET | OUTPATIENT
Start: 2024-07-01 | End: 2025-06-26

## 2024-07-01 RX ORDER — LOSARTAN POTASSIUM 100 MG/1
100 TABLET ORAL DAILY
Qty: 90 TABLET | Refills: 1 | Status: SHIPPED | OUTPATIENT
Start: 2024-07-01

## 2024-07-07 DIAGNOSIS — G62.9 NEUROPATHY: ICD-10-CM

## 2024-07-08 RX ORDER — PREGABALIN 75 MG/1
CAPSULE ORAL
Qty: 90 CAPSULE | Refills: 0 | Status: SHIPPED | OUTPATIENT
Start: 2024-07-08 | End: 2024-08-07

## 2024-07-22 ENCOUNTER — TELEPHONE (OUTPATIENT)
Age: 69
End: 2024-07-22

## 2024-07-22 NOTE — TELEPHONE ENCOUNTER
----- Message from Jen Tripathi sent at 7/22/2024 10:12 AM EDT -----  Regarding: FW: E[facility] Referral Request    ----- Message -----  From: Farideh Rosenbaum  Sent: 7/22/2024   9:55 AM EDT  To: #  Subject: E[facility] Referral Request                             E[facility] Referral Request    Reason for referral request: Other Ancillary    Specialist/Lab/Test patient is requesting (if known):Ultrasound for the breast    Specialist Phone Number (if applicable):    Additional Information Requesting for the order  --------------------------------------------------------------------------------------------------------------------------    Relationship to Patient: Covered Entity [facility] Scheduling team     Call Back Information: OK to leave message on voicemail  Preferred Call Back Number: Phone  [phone]

## 2024-07-22 NOTE — TELEPHONE ENCOUNTER
Left message requesting patient return call to the office to discuss need for breast US-reason and which breast.

## 2024-07-26 ENCOUNTER — HOSPITAL ENCOUNTER (OUTPATIENT)
Facility: HOSPITAL | Age: 69
End: 2024-07-26
Attending: ORTHOPAEDIC SURGERY
Payer: MEDICARE

## 2024-07-26 DIAGNOSIS — M12.811 ROTATOR CUFF TEAR ARTHROPATHY OF RIGHT SHOULDER: ICD-10-CM

## 2024-07-26 DIAGNOSIS — M75.101 ROTATOR CUFF TEAR ARTHROPATHY OF RIGHT SHOULDER: ICD-10-CM

## 2024-07-26 PROCEDURE — 73200 CT UPPER EXTREMITY W/O DYE: CPT

## 2024-08-05 DIAGNOSIS — C50.511 MALIGNANT NEOPLASM OF LOWER-OUTER QUADRANT OF RIGHT BREAST OF FEMALE, ESTROGEN RECEPTOR POSITIVE (HCC): Primary | ICD-10-CM

## 2024-08-05 DIAGNOSIS — Z17.0 MALIGNANT NEOPLASM OF LOWER-OUTER QUADRANT OF RIGHT BREAST OF FEMALE, ESTROGEN RECEPTOR POSITIVE (HCC): Primary | ICD-10-CM

## 2024-08-10 DIAGNOSIS — G62.9 NEUROPATHY: ICD-10-CM

## 2024-08-12 DIAGNOSIS — G62.9 NEUROPATHY: ICD-10-CM

## 2024-08-12 RX ORDER — PREGABALIN 75 MG/1
CAPSULE ORAL
Qty: 90 CAPSULE | Refills: 0 | Status: SHIPPED | OUTPATIENT
Start: 2024-08-12 | End: 2024-09-11

## 2024-08-12 RX ORDER — PREGABALIN 75 MG/1
CAPSULE ORAL
Qty: 90 CAPSULE | Refills: 0 | OUTPATIENT
Start: 2024-08-12 | End: 2024-09-11

## 2024-08-12 RX ORDER — PREGABALIN 75 MG/1
CAPSULE ORAL
Qty: 90 CAPSULE | Refills: 0 | OUTPATIENT
Start: 2024-08-12 | End: 2024-09-09

## 2024-08-25 NOTE — PROGRESS NOTES
Corinne Miles (:  1955) is a 68 y.o. female,Established patient, here for evaluation of the following chief complaint(s):  Pre-op Exam (Right shoulder replacement 9/10/2024)       Diagnosis Orders   1. Type 2 diabetes mellitus with diabetic nephropathy, without long-term current use of insulin (HCC)  Lipid Panel    Microalbumin / Creatinine Urine Ratio    Hemoglobin A1C      2. Malignant neoplasm of lower-outer quadrant of right breast of female, estrogen receptor positive (HCC)        3. Essential hypertension, benign  Comprehensive Metabolic Panel    CBC      4. Neuropathy        5. Acquired hypothyroidism  TSH    T4, Free      6. Gastro-esophageal reflux disease with esophagitis, without bleeding        7. Major depressive disorder, recurrent, moderate (HCC)        8. Preop examination            Metformin, farxiga   Aromasin ?mammogram  Losartan   Cymbalta , lyrica   Levothyroxine   Protonix   Adderall??, lamictal  , trazodone ; cymbalta;clonazepam prn  Assessment & Plan  1. Preoperative clearance.  Her blood pressure readings are within the normal range. The last recorded A1c level was 6.6. She has been cleared for surgery and the necessary paperwork has been completed. However, it is recommended to conduct some blood work prior to the procedure to ensure her diabetes is well-controlled. An after-visit summary will be provided to her.    2. Diabetes Mellitus.  She is currently taking metformin, 2 tablets in the morning and 1 tablet in the evening, and Farxiga. She is not regularly checking her blood sugar levels. Blood work will be conducted to ensure her diabetes is under control before the surgery.    3. Hypertension.  Her blood pressure is well-controlled with losartan. No changes to her current medication regimen are necessary.    4. History of Breast Cancer.  She is currently on Aromasin and is doing well with it. No changes to her current medication regimen are necessary.    5.  found in Flowsheets. The following problems were reviewed today and where indicated follow up appointments were made and/or referrals ordered.    Positive Risk Factor Screenings with Interventions:                Inactivity:  On average, how many days per week do you engage in moderate to strenuous exercise (like a brisk walk)?: 0 days (!) Abnormal  On average, how many minutes do you engage in exercise at this level?: 0 min    Interventions:  Patient declined any further interventions or treatment                         Objective   Vitals:    08/27/24 1027   BP: (!) 144/79   Site: Left Upper Arm   Position: Sitting   Cuff Size: Medium Adult   Pulse: 63   Resp: 16   Temp: 98.2 °F (36.8 °C)   TempSrc: Oral   SpO2: 95%   Weight: 65.8 kg (145 lb)   Height: 1.626 m (5' 4\")      Body mass index is 24.89 kg/m².                  No Known Allergies  Prior to Visit Medications    Medication Sig Taking? Authorizing Provider   lisdexamfetamine (VYVANSE) 50 MG capsule Take 1 capsule by mouth every morning. Pt unsure of dosage Max Daily Amount: 50 mg Yes ProviderChristopher MD   pregabalin (LYRICA) 75 MG capsule TAKE 1 CAPSULE BY MOUTH EVERY MORNING AND TAKE TWO CAPSULES BY MOUTH EVERY EVENING Yes Jacqueline Garcia MD   losartan (COZAAR) 100 MG tablet TAKE 1 TABLET BY MOUTH EVERY DAY Yes Jacqueline Garcia MD   exemestane (AROMASIN) 25 MG tablet Take 1 tablet by mouth daily Yes Yusef Rodgers MD   FARXIGA 10 MG tablet TAKE 1 TABLET BY MOUTH DAILY Yes Jacqueline Garcia MD   DULoxetine (CYMBALTA) 60 MG extended release capsule TAKE 1 CAPSULE BY MOUTH DAILY Yes Jacqueline Garcia MD   pantoprazole (PROTONIX) 40 MG tablet TAKE 1 TABLET BY MOUTH DAILY Yes Jacqueline Garcia MD   levothyroxine (SYNTHROID) 137 MCG tablet TAKE 1 TABLET BY MOUTH EVERY DAY Yes Jacqueline Garcia MD   metFORMIN (GLUCOPHAGE-XR) 500 MG extended release tablet TAKE 2 TABLETS BY MOUTH EVERY MORNING AND TAKE ONE TABLET BY MOUTH EVERY EVENING Yes

## 2024-08-27 ENCOUNTER — OFFICE VISIT (OUTPATIENT)
Age: 69
End: 2024-08-27
Payer: MEDICARE

## 2024-08-27 VITALS
SYSTOLIC BLOOD PRESSURE: 144 MMHG | WEIGHT: 145 LBS | HEIGHT: 64 IN | HEART RATE: 63 BPM | RESPIRATION RATE: 16 BRPM | BODY MASS INDEX: 24.75 KG/M2 | TEMPERATURE: 98.2 F | OXYGEN SATURATION: 95 % | DIASTOLIC BLOOD PRESSURE: 79 MMHG

## 2024-08-27 DIAGNOSIS — Z17.0 MALIGNANT NEOPLASM OF LOWER-OUTER QUADRANT OF RIGHT BREAST OF FEMALE, ESTROGEN RECEPTOR POSITIVE (HCC): ICD-10-CM

## 2024-08-27 DIAGNOSIS — Z00.00 MEDICARE ANNUAL WELLNESS VISIT, SUBSEQUENT: Primary | ICD-10-CM

## 2024-08-27 DIAGNOSIS — E03.9 ACQUIRED HYPOTHYROIDISM: ICD-10-CM

## 2024-08-27 DIAGNOSIS — K21.00 GASTRO-ESOPHAGEAL REFLUX DISEASE WITH ESOPHAGITIS, WITHOUT BLEEDING: ICD-10-CM

## 2024-08-27 DIAGNOSIS — E11.21 TYPE 2 DIABETES MELLITUS WITH DIABETIC NEPHROPATHY, WITHOUT LONG-TERM CURRENT USE OF INSULIN (HCC): ICD-10-CM

## 2024-08-27 DIAGNOSIS — I10 ESSENTIAL HYPERTENSION, BENIGN: ICD-10-CM

## 2024-08-27 DIAGNOSIS — C50.511 MALIGNANT NEOPLASM OF LOWER-OUTER QUADRANT OF RIGHT BREAST OF FEMALE, ESTROGEN RECEPTOR POSITIVE (HCC): ICD-10-CM

## 2024-08-27 DIAGNOSIS — F33.1 MAJOR DEPRESSIVE DISORDER, RECURRENT, MODERATE (HCC): ICD-10-CM

## 2024-08-27 DIAGNOSIS — Z01.818 PREOP EXAMINATION: ICD-10-CM

## 2024-08-27 DIAGNOSIS — G62.9 NEUROPATHY: ICD-10-CM

## 2024-08-27 PROCEDURE — 1090F PRES/ABSN URINE INCON ASSESS: CPT | Performed by: INTERNAL MEDICINE

## 2024-08-27 PROCEDURE — 3077F SYST BP >= 140 MM HG: CPT | Performed by: INTERNAL MEDICINE

## 2024-08-27 PROCEDURE — 1123F ACP DISCUSS/DSCN MKR DOCD: CPT | Performed by: INTERNAL MEDICINE

## 2024-08-27 PROCEDURE — 3078F DIAST BP <80 MM HG: CPT | Performed by: INTERNAL MEDICINE

## 2024-08-27 PROCEDURE — G8399 PT W/DXA RESULTS DOCUMENT: HCPCS | Performed by: INTERNAL MEDICINE

## 2024-08-27 PROCEDURE — 99214 OFFICE O/P EST MOD 30 MIN: CPT | Performed by: INTERNAL MEDICINE

## 2024-08-27 PROCEDURE — 3044F HG A1C LEVEL LT 7.0%: CPT | Performed by: INTERNAL MEDICINE

## 2024-08-27 PROCEDURE — 2022F DILAT RTA XM EVC RTNOPTHY: CPT | Performed by: INTERNAL MEDICINE

## 2024-08-27 PROCEDURE — G8420 CALC BMI NORM PARAMETERS: HCPCS | Performed by: INTERNAL MEDICINE

## 2024-08-27 PROCEDURE — 1036F TOBACCO NON-USER: CPT | Performed by: INTERNAL MEDICINE

## 2024-08-27 PROCEDURE — G8428 CUR MEDS NOT DOCUMENT: HCPCS | Performed by: INTERNAL MEDICINE

## 2024-08-27 PROCEDURE — G0439 PPPS, SUBSEQ VISIT: HCPCS | Performed by: INTERNAL MEDICINE

## 2024-08-27 PROCEDURE — 3017F COLORECTAL CA SCREEN DOC REV: CPT | Performed by: INTERNAL MEDICINE

## 2024-08-27 RX ORDER — LISDEXAMFETAMINE DIMESYLATE 50 MG/1
50 CAPSULE ORAL EVERY MORNING
COMMUNITY

## 2024-08-27 ASSESSMENT — PATIENT HEALTH QUESTIONNAIRE - PHQ9
SUM OF ALL RESPONSES TO PHQ QUESTIONS 1-9: 0
SUM OF ALL RESPONSES TO PHQ QUESTIONS 1-9: 0
7. TROUBLE CONCENTRATING ON THINGS, SUCH AS READING THE NEWSPAPER OR WATCHING TELEVISION: NOT AT ALL
8. MOVING OR SPEAKING SO SLOWLY THAT OTHER PEOPLE COULD HAVE NOTICED. OR THE OPPOSITE, BEING SO FIGETY OR RESTLESS THAT YOU HAVE BEEN MOVING AROUND A LOT MORE THAN USUAL: NOT AT ALL
2. FEELING DOWN, DEPRESSED OR HOPELESS: NOT AT ALL
SUM OF ALL RESPONSES TO PHQ QUESTIONS 1-9: 0
10. IF YOU CHECKED OFF ANY PROBLEMS, HOW DIFFICULT HAVE THESE PROBLEMS MADE IT FOR YOU TO DO YOUR WORK, TAKE CARE OF THINGS AT HOME, OR GET ALONG WITH OTHER PEOPLE: NOT DIFFICULT AT ALL
6. FEELING BAD ABOUT YOURSELF - OR THAT YOU ARE A FAILURE OR HAVE LET YOURSELF OR YOUR FAMILY DOWN: NOT AT ALL
1. LITTLE INTEREST OR PLEASURE IN DOING THINGS: NOT AT ALL
5. POOR APPETITE OR OVEREATING: NOT AT ALL
3. TROUBLE FALLING OR STAYING ASLEEP: NOT AT ALL
4. FEELING TIRED OR HAVING LITTLE ENERGY: NOT AT ALL
SUM OF ALL RESPONSES TO PHQ QUESTIONS 1-9: 0
9. THOUGHTS THAT YOU WOULD BE BETTER OFF DEAD, OR OF HURTING YOURSELF: NOT AT ALL
SUM OF ALL RESPONSES TO PHQ9 QUESTIONS 1 & 2: 0

## 2024-08-27 ASSESSMENT — LIFESTYLE VARIABLES
HOW OFTEN DO YOU HAVE A DRINK CONTAINING ALCOHOL: MONTHLY OR LESS
HOW MANY STANDARD DRINKS CONTAINING ALCOHOL DO YOU HAVE ON A TYPICAL DAY: 1 OR 2

## 2024-08-27 NOTE — PATIENT INSTRUCTIONS
Learning About Being Active as an Older Adult  Why is being active important as you get older?     Being active is one of the best things you can do for your health. And it's never too late to start. Being active--or getting active, if you aren't already--has definite benefits. It can:  Give you more energy,  Keep your mind sharp.  Improve balance to reduce your risk of falls.  Help you manage chronic illness with fewer medicines.  No matter how old you are, how fit you are, or what health problems you have, there is a form of activity that will work for you. And the more physical activity you can do, the better your overall health will be.  What kinds of activity can help you stay healthy?  Being more active will make your daily activities easier. Physical activity includes planned exercise and things you do in daily life. There are four types of activity:  Aerobic.  Doing aerobic activity makes your heart and lungs strong.  Includes walking, dancing, and gardening.  Aim for at least 2½ hours spread throughout the week.  It improves your energy and can help you sleep better.  Muscle-strengthening.  This type of activity can help maintain muscle and strengthen bones.  Includes climbing stairs, using resistance bands, and lifting or carrying heavy loads.  Aim for at least twice a week.  It can help protect the knees and other joints.  Stretching.  Stretching gives you better range of motion in joints and muscles.  Includes upper arm stretches, calf stretches, and gentle yoga.  Aim for at least twice a week, preferably after your muscles are warmed up from other activities.  It can help you function better in daily life.  Balancing.  This helps you stay coordinated and have good posture.  Includes heel-to-toe walking, ilene chi, and certain types of yoga.  Aim for at least 3 days a week.  It can reduce your risk of falling.  Even if you have a hard time meeting the recommendations, it's better to be more active  other health problems such as diabetes, high blood pressure, and high cholesterol. If you think you may have a problem with alcohol or drug use, talk to your doctor.   Medicines    Take your medicines exactly as prescribed. Call your doctor if you think you are having a problem with your medicine.     If your doctor recommends aspirin, take the amount directed each day. Make sure you take aspirin and not another kind of pain reliever, such as acetaminophen (Tylenol).   When should you call for help?   Call 911 if you have symptoms of a heart attack. These may include:    Chest pain or pressure, or a strange feeling in the chest.     Sweating.     Shortness of breath.     Pain, pressure, or a strange feeling in the back, neck, jaw, or upper belly or in one or both shoulders or arms.     Lightheadedness or sudden weakness.     A fast or irregular heartbeat.   After you call 911, the  may tell you to chew 1 adult-strength or 2 to 4 low-dose aspirin. Wait for an ambulance. Do not try to drive yourself.  Watch closely for changes in your health, and be sure to contact your doctor if you have any problems.  Where can you learn more?  Go to https://www.[employer].net/patientEd and enter F075 to learn more about \"A Healthy Heart: Care Instructions.\"  Current as of: June 24, 2023  Content Version: 14.1  © 7125-0247 [employer].   Care instructions adapted under license by [employer]. If you have questions about a medical condition or this instruction, always ask your healthcare professional. [employer] disclaims any warranty or liability for your use of this information.      Personalized Preventive Plan for Corinne Miles - 8/27/2024  Medicare offers a range of preventive health benefits. Some of the tests and screenings are paid in full while other may be subject to a deductible, co-insurance, and/or copay.    Some of these benefits include a comprehensive review of your medical

## 2024-09-05 DIAGNOSIS — G62.9 NEUROPATHY: ICD-10-CM

## 2024-09-05 RX ORDER — PREGABALIN 75 MG/1
CAPSULE ORAL
Qty: 90 CAPSULE | Refills: 0 | Status: SHIPPED | OUTPATIENT
Start: 2024-09-05 | End: 2024-10-05

## 2024-09-08 DIAGNOSIS — G62.9 NEUROPATHY: ICD-10-CM

## 2024-09-09 ENCOUNTER — TELEPHONE (OUTPATIENT)
Age: 69
End: 2024-09-09

## 2024-09-09 RX ORDER — PREGABALIN 75 MG/1
CAPSULE ORAL
Qty: 90 CAPSULE | Refills: 5 | Status: SHIPPED | OUTPATIENT
Start: 2024-09-09 | End: 2024-10-09

## 2024-09-23 RX ORDER — DAPAGLIFLOZIN 10 MG/1
10 TABLET, FILM COATED ORAL DAILY
Qty: 90 TABLET | Refills: 1 | Status: SHIPPED | OUTPATIENT
Start: 2024-09-23

## 2024-09-26 DIAGNOSIS — R30.0 DYSURIA: Primary | ICD-10-CM

## 2024-10-07 DIAGNOSIS — G62.9 NEUROPATHY: ICD-10-CM

## 2024-10-07 RX ORDER — PREGABALIN 75 MG/1
CAPSULE ORAL
Qty: 90 CAPSULE | Refills: 3 | Status: SHIPPED | OUTPATIENT
Start: 2024-10-07 | End: 2025-03-07

## 2024-10-21 RX ORDER — ROSUVASTATIN CALCIUM 5 MG/1
5 TABLET, COATED ORAL DAILY
Qty: 90 TABLET | Refills: 1 | Status: SHIPPED | OUTPATIENT
Start: 2024-10-21

## 2024-10-23 RX ORDER — METFORMIN HYDROCHLORIDE 500 MG/1
TABLET, EXTENDED RELEASE ORAL
Qty: 270 TABLET | Refills: 1 | Status: SHIPPED | OUTPATIENT
Start: 2024-10-23

## 2024-10-26 NOTE — PROGRESS NOTES
Corinne Miles (:  1955) is a 69 y.o. female,Established patient, here for evaluation of the following chief complaint(s):  Hypertension     Diagnosis Orders   1. Type 2 diabetes mellitus with diabetic nephropathy, without long-term current use of insulin (HCC)  Comprehensive Metabolic Panel    Lipid Panel    Microalbumin / Creatinine Urine Ratio    Hemoglobin A1C      2. Malignant neoplasm of lower-outer quadrant of right breast of female, estrogen receptor positive (HCC)        3. Essential hypertension, benign  CBC      4. Neuropathy        5. Acquired hypothyroidism  levothyroxine (SYNTHROID) 137 MCG tablet    TSH    T4, Free      6. Gastro-esophageal reflux disease with esophagitis, without bleeding        7. Major depressive disorder, recurrent, moderate (HCC)        8. Hypothyroidism, unspecified            Metformin, farxiga   Aromasin ?mammogram  Losartan   Cymbalta , lyrica   Levothyroxine   Protonix   vyvanse, lamictal  , trazodone ; cymbalta;clonazepam prn, clonidine      Assessment & Plan  1. Diabetes Mellitus.  She reports eating a balanced diet with vegetables and proteins, and occasionally consuming chocolate. She continues to take metformin and Farxiga. Her morning blood sugar was 138, which is an improvement from her last visit. She is also on Vyvanse, which may be contributing to weight loss.    2. Hypertension.  Her blood pressure has been stable at 124/80, but it was slightly elevated today. She continues to take losartan and is tolerating it well.    3. History of Breast Cancer.  She is on Aromasin and will continue this medication for a total of 10 years. She needs to reschedule her mammogram as she was unable to complete it previously due to shoulder issues.    4. Neuropathy.  She experiences sensitivity and discomfort, particularly in her ankles and feet. She is currently managing her symptoms with duloxetine and Lyrica. She reports that taking two pregabalins in the morning

## 2024-10-28 ENCOUNTER — OFFICE VISIT (OUTPATIENT)
Age: 69
End: 2024-10-28
Payer: MEDICARE

## 2024-10-28 VITALS
DIASTOLIC BLOOD PRESSURE: 70 MMHG | BODY MASS INDEX: 23.97 KG/M2 | RESPIRATION RATE: 16 BRPM | WEIGHT: 140.4 LBS | HEIGHT: 64 IN | SYSTOLIC BLOOD PRESSURE: 130 MMHG | TEMPERATURE: 98.3 F | HEART RATE: 90 BPM | OXYGEN SATURATION: 98 %

## 2024-10-28 DIAGNOSIS — Z17.0 MALIGNANT NEOPLASM OF LOWER-OUTER QUADRANT OF RIGHT BREAST OF FEMALE, ESTROGEN RECEPTOR POSITIVE (HCC): ICD-10-CM

## 2024-10-28 DIAGNOSIS — G62.9 NEUROPATHY: ICD-10-CM

## 2024-10-28 DIAGNOSIS — E11.21 TYPE 2 DIABETES MELLITUS WITH DIABETIC NEPHROPATHY, WITHOUT LONG-TERM CURRENT USE OF INSULIN (HCC): Primary | ICD-10-CM

## 2024-10-28 DIAGNOSIS — E03.9 ACQUIRED HYPOTHYROIDISM: ICD-10-CM

## 2024-10-28 DIAGNOSIS — I10 ESSENTIAL HYPERTENSION, BENIGN: ICD-10-CM

## 2024-10-28 DIAGNOSIS — C50.511 MALIGNANT NEOPLASM OF LOWER-OUTER QUADRANT OF RIGHT BREAST OF FEMALE, ESTROGEN RECEPTOR POSITIVE (HCC): ICD-10-CM

## 2024-10-28 DIAGNOSIS — F33.1 MAJOR DEPRESSIVE DISORDER, RECURRENT, MODERATE (HCC): ICD-10-CM

## 2024-10-28 DIAGNOSIS — E11.21 TYPE 2 DIABETES MELLITUS WITH DIABETIC NEPHROPATHY, WITHOUT LONG-TERM CURRENT USE OF INSULIN (HCC): ICD-10-CM

## 2024-10-28 DIAGNOSIS — K21.00 GASTRO-ESOPHAGEAL REFLUX DISEASE WITH ESOPHAGITIS, WITHOUT BLEEDING: ICD-10-CM

## 2024-10-28 PROCEDURE — 99214 OFFICE O/P EST MOD 30 MIN: CPT | Performed by: INTERNAL MEDICINE

## 2024-10-28 RX ORDER — ACETAMINOPHEN 160 MG
2000 TABLET,DISINTEGRATING ORAL DAILY
COMMUNITY

## 2024-10-28 RX ORDER — LEVOTHYROXINE SODIUM 137 UG/1
137 TABLET ORAL DAILY
Qty: 90 TABLET | Refills: 1 | Status: SHIPPED | OUTPATIENT
Start: 2024-10-28 | End: 2024-10-29

## 2024-10-28 SDOH — ECONOMIC STABILITY: FOOD INSECURITY: WITHIN THE PAST 12 MONTHS, THE FOOD YOU BOUGHT JUST DIDN'T LAST AND YOU DIDN'T HAVE MONEY TO GET MORE.: NEVER TRUE

## 2024-10-28 SDOH — ECONOMIC STABILITY: FOOD INSECURITY: WITHIN THE PAST 12 MONTHS, YOU WORRIED THAT YOUR FOOD WOULD RUN OUT BEFORE YOU GOT MONEY TO BUY MORE.: NEVER TRUE

## 2024-10-28 SDOH — ECONOMIC STABILITY: INCOME INSECURITY: HOW HARD IS IT FOR YOU TO PAY FOR THE VERY BASICS LIKE FOOD, HOUSING, MEDICAL CARE, AND HEATING?: NOT HARD AT ALL

## 2024-10-28 ASSESSMENT — PATIENT HEALTH QUESTIONNAIRE - PHQ9
10. IF YOU CHECKED OFF ANY PROBLEMS, HOW DIFFICULT HAVE THESE PROBLEMS MADE IT FOR YOU TO DO YOUR WORK, TAKE CARE OF THINGS AT HOME, OR GET ALONG WITH OTHER PEOPLE: NOT DIFFICULT AT ALL
6. FEELING BAD ABOUT YOURSELF - OR THAT YOU ARE A FAILURE OR HAVE LET YOURSELF OR YOUR FAMILY DOWN: NOT AT ALL
SUM OF ALL RESPONSES TO PHQ QUESTIONS 1-9: 0
4. FEELING TIRED OR HAVING LITTLE ENERGY: NOT AT ALL
SUM OF ALL RESPONSES TO PHQ QUESTIONS 1-9: 0
5. POOR APPETITE OR OVEREATING: NOT AT ALL
SUM OF ALL RESPONSES TO PHQ QUESTIONS 1-9: 0
SUM OF ALL RESPONSES TO PHQ9 QUESTIONS 1 & 2: 0
SUM OF ALL RESPONSES TO PHQ QUESTIONS 1-9: 0
2. FEELING DOWN, DEPRESSED OR HOPELESS: NOT AT ALL
1. LITTLE INTEREST OR PLEASURE IN DOING THINGS: NOT AT ALL
SUM OF ALL RESPONSES TO PHQ QUESTIONS 1-9: 0
SUM OF ALL RESPONSES TO PHQ QUESTIONS 1-9: 0
8. MOVING OR SPEAKING SO SLOWLY THAT OTHER PEOPLE COULD HAVE NOTICED. OR THE OPPOSITE, BEING SO FIGETY OR RESTLESS THAT YOU HAVE BEEN MOVING AROUND A LOT MORE THAN USUAL: NOT AT ALL
3. TROUBLE FALLING OR STAYING ASLEEP: NOT AT ALL
2. FEELING DOWN, DEPRESSED OR HOPELESS: NOT AT ALL
9. THOUGHTS THAT YOU WOULD BE BETTER OFF DEAD, OR OF HURTING YOURSELF: NOT AT ALL
1. LITTLE INTEREST OR PLEASURE IN DOING THINGS: NOT AT ALL
7. TROUBLE CONCENTRATING ON THINGS, SUCH AS READING THE NEWSPAPER OR WATCHING TELEVISION: NOT AT ALL
SUM OF ALL RESPONSES TO PHQ QUESTIONS 1-9: 0
SUM OF ALL RESPONSES TO PHQ9 QUESTIONS 1 & 2: 0
SUM OF ALL RESPONSES TO PHQ QUESTIONS 1-9: 0

## 2024-10-28 ASSESSMENT — LIFESTYLE VARIABLES
HOW OFTEN DO YOU HAVE A DRINK CONTAINING ALCOHOL: NEVER
HOW MANY STANDARD DRINKS CONTAINING ALCOHOL DO YOU HAVE ON A TYPICAL DAY: PATIENT DOES NOT DRINK

## 2024-10-28 NOTE — PROGRESS NOTES
\"Have you been to the ER, urgent care clinic since your last visit?  Hospitalized since your last visit?\"    NO    “Have you seen or consulted any other health care providers outside our system since your last visit?”    NO    Have you had a mammogram?”   NO- needs to schedule    Date of last Mammogram: 10/4/2022       “Have you had a colorectal cancer screening such as a colonoscopy/FIT/Cologuard?    NO    Date of last Colonoscopy: 8/20/2014  No cologuard on file  No FIT/FOBT on file   No flexible sigmoidoscopy on file

## 2024-10-29 ENCOUNTER — PATIENT MESSAGE (OUTPATIENT)
Age: 69
End: 2024-10-29

## 2024-10-29 DIAGNOSIS — G62.9 POLYNEUROPATHY, UNSPECIFIED: ICD-10-CM

## 2024-10-29 DIAGNOSIS — K21.00 GASTRO-ESOPHAGEAL REFLUX DISEASE WITH ESOPHAGITIS, WITHOUT BLEEDING: ICD-10-CM

## 2024-10-29 DIAGNOSIS — E03.9 ACQUIRED HYPOTHYROIDISM: ICD-10-CM

## 2024-10-29 LAB
ALBUMIN SERPL-MCNC: 4.4 G/DL (ref 3.5–5)
ALBUMIN/GLOB SERPL: 1.3 (ref 1.1–2.2)
ALP SERPL-CCNC: 124 U/L (ref 45–117)
ALT SERPL-CCNC: 24 U/L (ref 12–78)
ANION GAP SERPL CALC-SCNC: 6 MMOL/L (ref 2–12)
AST SERPL-CCNC: 14 U/L (ref 15–37)
BILIRUB SERPL-MCNC: 0.4 MG/DL (ref 0.2–1)
BUN SERPL-MCNC: 26 MG/DL (ref 6–20)
BUN/CREAT SERPL: 33 (ref 12–20)
CALCIUM SERPL-MCNC: 9.4 MG/DL (ref 8.5–10.1)
CHLORIDE SERPL-SCNC: 102 MMOL/L (ref 97–108)
CHOLEST SERPL-MCNC: 148 MG/DL
CO2 SERPL-SCNC: 28 MMOL/L (ref 21–32)
CREAT SERPL-MCNC: 0.8 MG/DL (ref 0.55–1.02)
CREAT UR-MCNC: 69 MG/DL
ERYTHROCYTE [DISTWIDTH] IN BLOOD BY AUTOMATED COUNT: 13 % (ref 11.5–14.5)
EST. AVERAGE GLUCOSE BLD GHB EST-MCNC: 160 MG/DL
GLOBULIN SER CALC-MCNC: 3.3 G/DL (ref 2–4)
GLUCOSE SERPL-MCNC: 159 MG/DL (ref 65–100)
HBA1C MFR BLD: 7.2 % (ref 4–5.6)
HCT VFR BLD AUTO: 42.8 % (ref 35–47)
HDLC SERPL-MCNC: 55 MG/DL
HDLC SERPL: 2.7 (ref 0–5)
HGB BLD-MCNC: 13.7 G/DL (ref 11.5–16)
LDLC SERPL CALC-MCNC: 67.8 MG/DL (ref 0–100)
MCH RBC QN AUTO: 28.4 PG (ref 26–34)
MCHC RBC AUTO-ENTMCNC: 32 G/DL (ref 30–36.5)
MCV RBC AUTO: 88.6 FL (ref 80–99)
MICROALBUMIN UR-MCNC: 4.88 MG/DL
MICROALBUMIN/CREAT UR-RTO: 71 MG/G (ref 0–30)
NRBC # BLD: 0 K/UL (ref 0–0.01)
NRBC BLD-RTO: 0 PER 100 WBC
PLATELET # BLD AUTO: 335 K/UL (ref 150–400)
PMV BLD AUTO: 10.4 FL (ref 8.9–12.9)
POTASSIUM SERPL-SCNC: 4.9 MMOL/L (ref 3.5–5.1)
PROT SERPL-MCNC: 7.7 G/DL (ref 6.4–8.2)
RBC # BLD AUTO: 4.83 M/UL (ref 3.8–5.2)
SODIUM SERPL-SCNC: 136 MMOL/L (ref 136–145)
T4 FREE SERPL-MCNC: 2.4 NG/DL (ref 0.8–1.5)
TRIGL SERPL-MCNC: 126 MG/DL
TSH SERPL DL<=0.05 MIU/L-ACNC: 0.05 UIU/ML (ref 0.36–3.74)
VLDLC SERPL CALC-MCNC: 25.2 MG/DL
WBC # BLD AUTO: 6.8 K/UL (ref 3.6–11)

## 2024-10-29 RX ORDER — PANTOPRAZOLE SODIUM 40 MG/1
40 TABLET, DELAYED RELEASE ORAL DAILY
Qty: 90 TABLET | Refills: 1 | Status: SHIPPED | OUTPATIENT
Start: 2024-10-29

## 2024-10-29 RX ORDER — LEVOTHYROXINE SODIUM 125 UG/1
125 TABLET ORAL DAILY
Qty: 30 TABLET | Refills: 2 | Status: SHIPPED | OUTPATIENT
Start: 2024-10-29

## 2024-10-29 RX ORDER — DULOXETIN HYDROCHLORIDE 60 MG/1
60 CAPSULE, DELAYED RELEASE ORAL DAILY
Qty: 90 CAPSULE | Refills: 1 | Status: SHIPPED | OUTPATIENT
Start: 2024-10-29

## 2024-11-10 DIAGNOSIS — E03.9 HYPOTHYROIDISM, UNSPECIFIED: ICD-10-CM

## 2024-11-10 RX ORDER — LEVOTHYROXINE SODIUM 137 UG/1
137 TABLET ORAL DAILY
Qty: 30 TABLET | Refills: 5 | Status: SHIPPED | OUTPATIENT
Start: 2024-11-10

## 2024-12-04 NOTE — PROGRESS NOTES
Corinne Miles (:  1955) is a 69 y.o. female,Established patient, here for evaluation of the following chief complaint(s):  Diabetes          Diagnosis Orders   1. Type 2 diabetes mellitus with diabetic nephropathy, without long-term current use of insulin (HCC)        2. Malignant neoplasm of lower-outer quadrant of right breast of female, estrogen receptor positive (HCC)        3. Essential hypertension, benign        4. Neuropathy        5. Acquired hypothyroidism        6. Gastro-esophageal reflux disease with esophagitis, without bleeding        7. Major depressive disorder, recurrent, moderate (HCC)        8. Screening for colon cancer  Cologuard (Fecal DNA Colorectal Cancer Screening)          Metformin, farxiga   Aromasin ?mammogram  Losartan 1/2 of it   Cymbalta , lyrica   Levothyroxine -recheck levels; lower dose  Protonix   vyvanse, lamictal  , trazodone ; cymbalta;clonazepam prn, clonidine     Colon?  Assessment & Plan  1. Hypothyroidism.  A thyroid level check will be conducted today. Currently on 125 mcg, previously on 137 mcg. If the results indicate an excess of thyroid medication, a reduction in dosage will be considered.    2. Hypertension.  The dosage of losartan will be reduced from 100 mg to 50 mg, with regular monitoring of blood pressure.    3. Weight loss.  Encouraged to focus on protein intake to prevent muscle mass loss and maintain overall health. Advised not to lose more weight.    4. Health Maintenance.  A Cologuard test will be ordered.    5.  Diabetes continue with Jardiance and metformin seems to be trying to increase protein and be active    6.  Depression continue with Lamictal ,Vyvanse ,Klonopin and clonidine at night.  She also continues with the Cymbalta and mood has been overall stable.    7.  Neuropathy she is stable she is going continue on the Lyrica and the Cymbalta            Subjective   HPI  History of Present Illness  The patient presents for evaluation of

## 2024-12-05 ENCOUNTER — OFFICE VISIT (OUTPATIENT)
Facility: CLINIC | Age: 69
End: 2024-12-05
Payer: MEDICARE

## 2024-12-05 VITALS
WEIGHT: 143.2 LBS | TEMPERATURE: 98.1 F | RESPIRATION RATE: 16 BRPM | SYSTOLIC BLOOD PRESSURE: 108 MMHG | DIASTOLIC BLOOD PRESSURE: 70 MMHG | BODY MASS INDEX: 24.45 KG/M2 | HEIGHT: 64 IN | HEART RATE: 109 BPM | OXYGEN SATURATION: 96 %

## 2024-12-05 DIAGNOSIS — I10 ESSENTIAL HYPERTENSION, BENIGN: ICD-10-CM

## 2024-12-05 DIAGNOSIS — G62.9 NEUROPATHY: ICD-10-CM

## 2024-12-05 DIAGNOSIS — E11.21 TYPE 2 DIABETES MELLITUS WITH DIABETIC NEPHROPATHY, WITHOUT LONG-TERM CURRENT USE OF INSULIN (HCC): Primary | ICD-10-CM

## 2024-12-05 DIAGNOSIS — K21.00 GASTRO-ESOPHAGEAL REFLUX DISEASE WITH ESOPHAGITIS, WITHOUT BLEEDING: ICD-10-CM

## 2024-12-05 DIAGNOSIS — E03.9 ACQUIRED HYPOTHYROIDISM: ICD-10-CM

## 2024-12-05 DIAGNOSIS — F33.1 MAJOR DEPRESSIVE DISORDER, RECURRENT, MODERATE (HCC): ICD-10-CM

## 2024-12-05 DIAGNOSIS — Z12.11 SCREENING FOR COLON CANCER: ICD-10-CM

## 2024-12-05 DIAGNOSIS — Z17.0 MALIGNANT NEOPLASM OF LOWER-OUTER QUADRANT OF RIGHT BREAST OF FEMALE, ESTROGEN RECEPTOR POSITIVE (HCC): ICD-10-CM

## 2024-12-05 DIAGNOSIS — C50.511 MALIGNANT NEOPLASM OF LOWER-OUTER QUADRANT OF RIGHT BREAST OF FEMALE, ESTROGEN RECEPTOR POSITIVE (HCC): ICD-10-CM

## 2024-12-05 PROCEDURE — 1036F TOBACCO NON-USER: CPT | Performed by: INTERNAL MEDICINE

## 2024-12-05 PROCEDURE — 1126F AMNT PAIN NOTED NONE PRSNT: CPT | Performed by: INTERNAL MEDICINE

## 2024-12-05 PROCEDURE — 3017F COLORECTAL CA SCREEN DOC REV: CPT | Performed by: INTERNAL MEDICINE

## 2024-12-05 PROCEDURE — 1090F PRES/ABSN URINE INCON ASSESS: CPT | Performed by: INTERNAL MEDICINE

## 2024-12-05 PROCEDURE — G8399 PT W/DXA RESULTS DOCUMENT: HCPCS | Performed by: INTERNAL MEDICINE

## 2024-12-05 PROCEDURE — 1123F ACP DISCUSS/DSCN MKR DOCD: CPT | Performed by: INTERNAL MEDICINE

## 2024-12-05 PROCEDURE — 1160F RVW MEDS BY RX/DR IN RCRD: CPT | Performed by: INTERNAL MEDICINE

## 2024-12-05 PROCEDURE — G8427 DOCREV CUR MEDS BY ELIG CLIN: HCPCS | Performed by: INTERNAL MEDICINE

## 2024-12-05 PROCEDURE — 3074F SYST BP LT 130 MM HG: CPT | Performed by: INTERNAL MEDICINE

## 2024-12-05 PROCEDURE — 3051F HG A1C>EQUAL 7.0%<8.0%: CPT | Performed by: INTERNAL MEDICINE

## 2024-12-05 PROCEDURE — 1159F MED LIST DOCD IN RCRD: CPT | Performed by: INTERNAL MEDICINE

## 2024-12-05 PROCEDURE — G8484 FLU IMMUNIZE NO ADMIN: HCPCS | Performed by: INTERNAL MEDICINE

## 2024-12-05 PROCEDURE — G8420 CALC BMI NORM PARAMETERS: HCPCS | Performed by: INTERNAL MEDICINE

## 2024-12-05 PROCEDURE — 99214 OFFICE O/P EST MOD 30 MIN: CPT | Performed by: INTERNAL MEDICINE

## 2024-12-05 PROCEDURE — 3078F DIAST BP <80 MM HG: CPT | Performed by: INTERNAL MEDICINE

## 2024-12-05 PROCEDURE — 2022F DILAT RTA XM EVC RTNOPTHY: CPT | Performed by: INTERNAL MEDICINE

## 2024-12-17 DIAGNOSIS — E03.9 ACQUIRED HYPOTHYROIDISM: ICD-10-CM

## 2024-12-17 LAB
T4 FREE SERPL-MCNC: 1.5 NG/DL (ref 0.8–1.5)
TSH SERPL DL<=0.05 MIU/L-ACNC: 0.67 UIU/ML (ref 0.36–3.74)

## 2024-12-18 ENCOUNTER — HOSPITAL ENCOUNTER (OUTPATIENT)
Facility: HOSPITAL | Age: 69
Discharge: HOME | End: 2024-12-21
Attending: INTERNAL MEDICINE
Payer: MEDICARE

## 2024-12-18 DIAGNOSIS — Z78.0 POSTMENOPAUSAL: ICD-10-CM

## 2024-12-18 PROCEDURE — 77080 DXA BONE DENSITY AXIAL: CPT

## 2024-12-25 DIAGNOSIS — I10 ESSENTIAL (PRIMARY) HYPERTENSION: ICD-10-CM

## 2024-12-26 RX ORDER — LOSARTAN POTASSIUM 100 MG/1
50 TABLET ORAL DAILY
Qty: 45 TABLET | Refills: 1 | Status: SHIPPED | OUTPATIENT
Start: 2024-12-26

## 2025-01-04 LAB — NONINV COLON CA DNA+OCC BLD SCRN STL QL: POSITIVE

## 2025-01-06 ENCOUNTER — TELEPHONE (OUTPATIENT)
Facility: CLINIC | Age: 70
End: 2025-01-06

## 2025-01-06 NOTE — TELEPHONE ENCOUNTER
----- Message from Dr. Jacqueline Garcia MD sent at 1/5/2025  8:30 AM EST -----  Since this is  positive, can you call either GI group and get her in for pos cologuard? [facility] may be easier as they just converted their schedule

## 2025-01-06 NOTE — TELEPHONE ENCOUNTER
01/06/2025--Patient's information along with the cologuard results sent to 's office requesting they call patient to schedule an appointment for positive cologuard results.

## 2025-01-23 DIAGNOSIS — E03.9 ACQUIRED HYPOTHYROIDISM: ICD-10-CM

## 2025-01-23 RX ORDER — LEVOTHYROXINE SODIUM 125 UG/1
125 TABLET ORAL DAILY
Qty: 90 TABLET | Refills: 0 | Status: SHIPPED | OUTPATIENT
Start: 2025-01-23

## 2025-01-25 ENCOUNTER — PATIENT MESSAGE (OUTPATIENT)
Facility: CLINIC | Age: 70
End: 2025-01-25

## 2025-01-25 RX ORDER — DAPAGLIFLOZIN 10 MG/1
10 TABLET, FILM COATED ORAL EVERY MORNING
Qty: 90 TABLET | Refills: 3 | Status: SHIPPED | OUTPATIENT
Start: 2025-01-25

## 2025-02-05 ENCOUNTER — HOSPITAL ENCOUNTER (OUTPATIENT)
Facility: HOSPITAL | Age: 70
Discharge: HOME | End: 2025-02-08
Payer: MEDICARE

## 2025-02-05 DIAGNOSIS — S42.121A CLOSED DISPLACED FRACTURE OF ACROMIAL PROCESS OF RIGHT SCAPULA, INITIAL ENCOUNTER: ICD-10-CM

## 2025-02-05 PROCEDURE — 73200 CT UPPER EXTREMITY W/O DYE: CPT

## 2025-02-19 ENCOUNTER — HOSPITAL ENCOUNTER (OUTPATIENT)
Facility: HOSPITAL | Age: 70
Discharge: HOME | End: 2025-02-22
Attending: INTERNAL MEDICINE
Payer: MEDICARE

## 2025-02-19 DIAGNOSIS — Z17.0 MALIGNANT NEOPLASM OF LOWER-OUTER QUADRANT OF RIGHT BREAST OF FEMALE, ESTROGEN RECEPTOR POSITIVE (HCC): ICD-10-CM

## 2025-02-19 DIAGNOSIS — C50.511 MALIGNANT NEOPLASM OF LOWER-OUTER QUADRANT OF RIGHT BREAST OF FEMALE, ESTROGEN RECEPTOR POSITIVE (HCC): ICD-10-CM

## 2025-02-19 PROCEDURE — G0279 TOMOSYNTHESIS, MAMMO: HCPCS

## 2025-03-09 DIAGNOSIS — G62.9 NEUROPATHY: ICD-10-CM

## 2025-03-10 RX ORDER — PREGABALIN 75 MG/1
CAPSULE ORAL
Qty: 90 CAPSULE | Refills: 0 | Status: SHIPPED | OUTPATIENT
Start: 2025-03-10 | End: 2025-04-08

## 2025-03-21 ENCOUNTER — PATIENT MESSAGE (OUTPATIENT)
Facility: CLINIC | Age: 70
End: 2025-03-21

## 2025-04-16 DIAGNOSIS — I10 ESSENTIAL (PRIMARY) HYPERTENSION: Primary | ICD-10-CM

## 2025-04-16 RX ORDER — ROSUVASTATIN CALCIUM 5 MG/1
5 TABLET, COATED ORAL DAILY
Qty: 90 TABLET | Refills: 1 | Status: SHIPPED | OUTPATIENT
Start: 2025-04-16

## 2025-04-16 NOTE — TELEPHONE ENCOUNTER
Last visit 12/5/24  No follow up scheduled   Lab Results   Component Value Date     10/28/2024    K 4.9 10/28/2024     10/28/2024    CO2 28 10/28/2024    BUN 26 (H) 10/28/2024    CREATININE 0.80 10/28/2024    GLUCOSE 159 (H) 10/28/2024    CALCIUM 9.4 10/28/2024    BILITOT 0.4 10/28/2024    ALKPHOS 124 (H) 10/28/2024    AST 14 (L) 10/28/2024    ALT 24 10/28/2024    LABGLOM 79 10/28/2024    GFRAA >60 04/18/2022    AGRATIO 1.3 03/22/2023    GLOB 3.3 10/28/2024     Lab Results   Component Value Date    WBC 6.8 10/28/2024    HGB 13.7 10/28/2024    HCT 42.8 10/28/2024    MCV 88.6 10/28/2024     10/28/2024

## 2025-04-17 RX ORDER — METFORMIN HYDROCHLORIDE 500 MG/1
TABLET, EXTENDED RELEASE ORAL
Qty: 270 TABLET | Refills: 1 | Status: SHIPPED | OUTPATIENT
Start: 2025-04-17

## 2025-04-22 DIAGNOSIS — E03.9 ACQUIRED HYPOTHYROIDISM: ICD-10-CM

## 2025-04-22 DIAGNOSIS — K21.00 GASTRO-ESOPHAGEAL REFLUX DISEASE WITH ESOPHAGITIS, WITHOUT BLEEDING: ICD-10-CM

## 2025-04-22 DIAGNOSIS — G62.9 POLYNEUROPATHY, UNSPECIFIED: ICD-10-CM

## 2025-04-22 RX ORDER — PANTOPRAZOLE SODIUM 40 MG/1
40 TABLET, DELAYED RELEASE ORAL DAILY
Qty: 90 TABLET | Refills: 1 | Status: SHIPPED | OUTPATIENT
Start: 2025-04-22

## 2025-04-22 RX ORDER — DULOXETIN HYDROCHLORIDE 60 MG/1
60 CAPSULE, DELAYED RELEASE ORAL DAILY
Qty: 90 CAPSULE | Refills: 1 | Status: SHIPPED | OUTPATIENT
Start: 2025-04-22

## 2025-04-22 RX ORDER — LEVOTHYROXINE SODIUM 125 UG/1
125 TABLET ORAL DAILY
Qty: 90 TABLET | Refills: 0 | Status: SHIPPED | OUTPATIENT
Start: 2025-04-22

## 2025-05-14 DIAGNOSIS — G62.9 NEUROPATHY: ICD-10-CM

## 2025-05-15 RX ORDER — PREGABALIN 75 MG/1
CAPSULE ORAL
Qty: 90 CAPSULE | Refills: 3 | Status: SHIPPED | OUTPATIENT
Start: 2025-05-15 | End: 2025-06-12

## 2025-05-21 DIAGNOSIS — Z17.0 MALIGNANT NEOPLASM OF LOWER-OUTER QUADRANT OF RIGHT BREAST OF FEMALE, ESTROGEN RECEPTOR POSITIVE (HCC): ICD-10-CM

## 2025-05-21 DIAGNOSIS — C50.511 MALIGNANT NEOPLASM OF LOWER-OUTER QUADRANT OF RIGHT BREAST OF FEMALE, ESTROGEN RECEPTOR POSITIVE (HCC): ICD-10-CM

## 2025-05-28 RX ORDER — EXEMESTANE 25 MG/1
25 TABLET ORAL DAILY
Qty: 90 TABLET | Refills: 0 | Status: SHIPPED | OUTPATIENT
Start: 2025-05-28 | End: 2026-05-23

## 2025-06-17 NOTE — PROGRESS NOTES
rhythm.   Pulmonary:      Effort: Pulmonary effort is normal.      Breath sounds: Normal breath sounds.   Abdominal:      General: Bowel sounds are normal.      Palpations: Abdomen is soft.   Musculoskeletal:         General: Normal range of motion.      Cervical back: Normal range of motion.   Skin:     General: Skin is warm.   Neurological:      General: No focal deficit present.      Mental Status: She is alert and oriented to person, place, and time. Mental status is at baseline.   Psychiatric:         Mood and Affect: Mood normal.         Behavior: Behavior normal.         Thought Content: Thought content normal.         Judgment: Judgment normal.            On this date 6/23/2025 I have spent 40 minutes reviewing previous notes, test results and face to face with the patient discussing the diagnosis and importance of compliance with the treatment plan as well as documenting on the day of the visit.    The patient (or guardian, if applicable) and other individuals in attendance with the patient were advised that Artificial Intelligence will be utilized during this visit to record, process the conversation to generate a clinical note, and support improvement of the AI technology. The patient (or guardian, if applicable) and other individuals in attendance at the appointment consented to the use of AI, including the recording.                 An electronic signature was used to authenticate this note.    --Jacqueline Garcia MD

## 2025-06-23 ENCOUNTER — OFFICE VISIT (OUTPATIENT)
Facility: CLINIC | Age: 70
End: 2025-06-23
Payer: MEDICARE

## 2025-06-23 VITALS
SYSTOLIC BLOOD PRESSURE: 112 MMHG | OXYGEN SATURATION: 96 % | DIASTOLIC BLOOD PRESSURE: 74 MMHG | BODY MASS INDEX: 23.35 KG/M2 | WEIGHT: 136.8 LBS | HEART RATE: 82 BPM | HEIGHT: 64 IN | TEMPERATURE: 98 F | RESPIRATION RATE: 14 BRPM

## 2025-06-23 DIAGNOSIS — F33.1 MAJOR DEPRESSIVE DISORDER, RECURRENT, MODERATE (HCC): ICD-10-CM

## 2025-06-23 DIAGNOSIS — Z17.0 MALIGNANT NEOPLASM OF LOWER-OUTER QUADRANT OF RIGHT BREAST OF FEMALE, ESTROGEN RECEPTOR POSITIVE (HCC): ICD-10-CM

## 2025-06-23 DIAGNOSIS — E03.9 ACQUIRED HYPOTHYROIDISM: ICD-10-CM

## 2025-06-23 DIAGNOSIS — E11.21 TYPE 2 DIABETES MELLITUS WITH DIABETIC NEPHROPATHY, WITHOUT LONG-TERM CURRENT USE OF INSULIN (HCC): Primary | ICD-10-CM

## 2025-06-23 DIAGNOSIS — I10 ESSENTIAL (PRIMARY) HYPERTENSION: ICD-10-CM

## 2025-06-23 DIAGNOSIS — I10 ESSENTIAL HYPERTENSION, BENIGN: ICD-10-CM

## 2025-06-23 DIAGNOSIS — G62.9 NEUROPATHY: ICD-10-CM

## 2025-06-23 DIAGNOSIS — C50.511 MALIGNANT NEOPLASM OF LOWER-OUTER QUADRANT OF RIGHT BREAST OF FEMALE, ESTROGEN RECEPTOR POSITIVE (HCC): ICD-10-CM

## 2025-06-23 DIAGNOSIS — K21.00 GASTRO-ESOPHAGEAL REFLUX DISEASE WITH ESOPHAGITIS, WITHOUT BLEEDING: ICD-10-CM

## 2025-06-23 PROCEDURE — 2022F DILAT RTA XM EVC RTNOPTHY: CPT | Performed by: INTERNAL MEDICINE

## 2025-06-23 PROCEDURE — 3074F SYST BP LT 130 MM HG: CPT | Performed by: INTERNAL MEDICINE

## 2025-06-23 PROCEDURE — G8427 DOCREV CUR MEDS BY ELIG CLIN: HCPCS | Performed by: INTERNAL MEDICINE

## 2025-06-23 PROCEDURE — 1123F ACP DISCUSS/DSCN MKR DOCD: CPT | Performed by: INTERNAL MEDICINE

## 2025-06-23 PROCEDURE — 3078F DIAST BP <80 MM HG: CPT | Performed by: INTERNAL MEDICINE

## 2025-06-23 PROCEDURE — 1090F PRES/ABSN URINE INCON ASSESS: CPT | Performed by: INTERNAL MEDICINE

## 2025-06-23 PROCEDURE — 1036F TOBACCO NON-USER: CPT | Performed by: INTERNAL MEDICINE

## 2025-06-23 PROCEDURE — 3046F HEMOGLOBIN A1C LEVEL >9.0%: CPT | Performed by: INTERNAL MEDICINE

## 2025-06-23 PROCEDURE — G8399 PT W/DXA RESULTS DOCUMENT: HCPCS | Performed by: INTERNAL MEDICINE

## 2025-06-23 PROCEDURE — 1160F RVW MEDS BY RX/DR IN RCRD: CPT | Performed by: INTERNAL MEDICINE

## 2025-06-23 PROCEDURE — 3017F COLORECTAL CA SCREEN DOC REV: CPT | Performed by: INTERNAL MEDICINE

## 2025-06-23 PROCEDURE — 1159F MED LIST DOCD IN RCRD: CPT | Performed by: INTERNAL MEDICINE

## 2025-06-23 PROCEDURE — 99215 OFFICE O/P EST HI 40 MIN: CPT | Performed by: INTERNAL MEDICINE

## 2025-06-23 PROCEDURE — G8420 CALC BMI NORM PARAMETERS: HCPCS | Performed by: INTERNAL MEDICINE

## 2025-06-23 RX ORDER — DEXTROAMPHETAMINE SACCHARATE, AMPHETAMINE ASPARTATE, DEXTROAMPHETAMINE SULFATE AND AMPHETAMINE SULFATE 2.5; 2.5; 2.5; 2.5 MG/1; MG/1; MG/1; MG/1
10 TABLET ORAL
COMMUNITY
Start: 2025-06-18

## 2025-06-23 RX ORDER — LOSARTAN POTASSIUM 100 MG/1
50 TABLET ORAL DAILY
Qty: 45 TABLET | Refills: 1 | Status: SHIPPED | OUTPATIENT
Start: 2025-06-23

## 2025-06-23 SDOH — ECONOMIC STABILITY: FOOD INSECURITY: WITHIN THE PAST 12 MONTHS, YOU WORRIED THAT YOUR FOOD WOULD RUN OUT BEFORE YOU GOT MONEY TO BUY MORE.: NEVER TRUE

## 2025-06-23 SDOH — ECONOMIC STABILITY: FOOD INSECURITY: WITHIN THE PAST 12 MONTHS, THE FOOD YOU BOUGHT JUST DIDN'T LAST AND YOU DIDN'T HAVE MONEY TO GET MORE.: NEVER TRUE

## 2025-06-23 ASSESSMENT — PATIENT HEALTH QUESTIONNAIRE - PHQ9
1. LITTLE INTEREST OR PLEASURE IN DOING THINGS: SEVERAL DAYS
SUM OF ALL RESPONSES TO PHQ QUESTIONS 1-9: 2
2. FEELING DOWN, DEPRESSED OR HOPELESS: SEVERAL DAYS
SUM OF ALL RESPONSES TO PHQ QUESTIONS 1-9: 2

## 2025-07-17 DIAGNOSIS — E03.9 ACQUIRED HYPOTHYROIDISM: ICD-10-CM

## 2025-07-17 RX ORDER — LEVOTHYROXINE SODIUM 125 UG/1
125 TABLET ORAL DAILY
Qty: 90 TABLET | Refills: 0 | Status: SHIPPED | OUTPATIENT
Start: 2025-07-17

## 2025-08-07 ENCOUNTER — OFFICE VISIT (OUTPATIENT)
Age: 70
End: 2025-08-07
Payer: MEDICARE

## 2025-08-07 VITALS
DIASTOLIC BLOOD PRESSURE: 57 MMHG | WEIGHT: 135 LBS | TEMPERATURE: 97.9 F | BODY MASS INDEX: 23.05 KG/M2 | OXYGEN SATURATION: 97 % | HEART RATE: 50 BPM | RESPIRATION RATE: 18 BRPM | HEIGHT: 64 IN | SYSTOLIC BLOOD PRESSURE: 130 MMHG

## 2025-08-07 DIAGNOSIS — C50.511 MALIGNANT NEOPLASM OF LOWER-OUTER QUADRANT OF RIGHT BREAST OF FEMALE, ESTROGEN RECEPTOR POSITIVE (HCC): Primary | ICD-10-CM

## 2025-08-07 DIAGNOSIS — Z17.0 MALIGNANT NEOPLASM OF LOWER-OUTER QUADRANT OF RIGHT BREAST OF FEMALE, ESTROGEN RECEPTOR POSITIVE (HCC): Primary | ICD-10-CM

## 2025-08-07 PROCEDURE — 99213 OFFICE O/P EST LOW 20 MIN: CPT | Performed by: NURSE PRACTITIONER

## 2025-08-07 RX ORDER — EXEMESTANE 25 MG/1
25 TABLET ORAL DAILY
Qty: 90 TABLET | Refills: 3 | Status: SHIPPED | OUTPATIENT
Start: 2025-08-07 | End: 2026-08-02

## 2025-08-07 ASSESSMENT — PATIENT HEALTH QUESTIONNAIRE - PHQ9
SUM OF ALL RESPONSES TO PHQ QUESTIONS 1-9: 1
1. LITTLE INTEREST OR PLEASURE IN DOING THINGS: NOT AT ALL
2. FEELING DOWN, DEPRESSED OR HOPELESS: SEVERAL DAYS
SUM OF ALL RESPONSES TO PHQ QUESTIONS 1-9: 1

## 2025-08-27 DIAGNOSIS — I10 ESSENTIAL (PRIMARY) HYPERTENSION: ICD-10-CM

## 2025-08-27 RX ORDER — LOSARTAN POTASSIUM 100 MG/1
100 TABLET ORAL DAILY
Qty: 90 TABLET | Refills: 1 | Status: SHIPPED | OUTPATIENT
Start: 2025-08-27

## (undated) DEVICE — CHEST PACK-SFMCASU: Brand: MEDLINE INDUSTRIES, INC.

## (undated) DEVICE — DEVICE TRNSF SPIK STL 2008S] MICROTEK MEDICAL INC]

## (undated) DEVICE — CURVED, SMALL JAW, OPEN SEALER/DIVIDER: Brand: LIGASURE

## (undated) DEVICE — COVER US PRB W12XL244CM FLD IORT STR TIP

## (undated) DEVICE — SOL IRRIGATION INJ NACL 0.9% 500ML BTL

## (undated) DEVICE — NEEDLE HYPO 22GA L1.5IN BLK S STL HUB POLYPR SHLD REG BVL

## (undated) DEVICE — CANISTER, RIGID, 3000CC: Brand: MEDLINE INDUSTRIES, INC.

## (undated) DEVICE — PENCIL SMK EVAC L10FT DIA95MM TBNG NONSTICK W ADPT TO 22MM

## (undated) DEVICE — INSULATED BLADE ELECTRODE: Brand: EDGE

## (undated) DEVICE — GLOVE ORANGE PI 7 1/2   MSG9075

## (undated) DEVICE — SUT SLK 2-0SH 30IN BLK --